# Patient Record
Sex: MALE | Race: WHITE | NOT HISPANIC OR LATINO | Employment: OTHER | URBAN - METROPOLITAN AREA
[De-identification: names, ages, dates, MRNs, and addresses within clinical notes are randomized per-mention and may not be internally consistent; named-entity substitution may affect disease eponyms.]

---

## 2018-07-26 ENCOUNTER — OFFICE VISIT (OUTPATIENT)
Dept: MEDICAL GROUP | Facility: PHYSICIAN GROUP | Age: 68
End: 2018-07-26
Payer: MEDICARE

## 2018-07-26 VITALS
WEIGHT: 184 LBS | HEART RATE: 71 BPM | SYSTOLIC BLOOD PRESSURE: 100 MMHG | RESPIRATION RATE: 16 BRPM | BODY MASS INDEX: 25.76 KG/M2 | OXYGEN SATURATION: 96 % | HEIGHT: 71 IN | DIASTOLIC BLOOD PRESSURE: 62 MMHG | TEMPERATURE: 97.4 F

## 2018-07-26 DIAGNOSIS — Z23 NEED FOR VACCINATION: ICD-10-CM

## 2018-07-26 DIAGNOSIS — I25.2 HISTORY OF MI (MYOCARDIAL INFARCTION): ICD-10-CM

## 2018-07-26 DIAGNOSIS — I48.20 CHRONIC ATRIAL FIBRILLATION (HCC): ICD-10-CM

## 2018-07-26 DIAGNOSIS — Z12.11 SCREENING FOR COLON CANCER: ICD-10-CM

## 2018-07-26 DIAGNOSIS — E78.2 MIXED HYPERLIPIDEMIA: ICD-10-CM

## 2018-07-26 DIAGNOSIS — Z76.89 ESTABLISHING CARE WITH NEW DOCTOR, ENCOUNTER FOR: ICD-10-CM

## 2018-07-26 DIAGNOSIS — M25.522 LEFT ELBOW PAIN: ICD-10-CM

## 2018-07-26 DIAGNOSIS — I10 ESSENTIAL HYPERTENSION: ICD-10-CM

## 2018-07-26 DIAGNOSIS — Z95.0 PACEMAKER: ICD-10-CM

## 2018-07-26 DIAGNOSIS — F17.210 CIGARETTE NICOTINE DEPENDENCE WITHOUT COMPLICATION: ICD-10-CM

## 2018-07-26 DIAGNOSIS — R05.9 COUGH: ICD-10-CM

## 2018-07-26 PROCEDURE — 90715 TDAP VACCINE 7 YRS/> IM: CPT | Performed by: NURSE PRACTITIONER

## 2018-07-26 PROCEDURE — 99204 OFFICE O/P NEW MOD 45 MIN: CPT | Mod: 25 | Performed by: NURSE PRACTITIONER

## 2018-07-26 PROCEDURE — 90670 PCV13 VACCINE IM: CPT | Performed by: NURSE PRACTITIONER

## 2018-07-26 PROCEDURE — G0009 ADMIN PNEUMOCOCCAL VACCINE: HCPCS | Performed by: NURSE PRACTITIONER

## 2018-07-26 RX ORDER — CARVEDILOL 6.25 MG/1
25 TABLET ORAL 2 TIMES DAILY WITH MEALS
COMMUNITY
End: 2019-11-27

## 2018-07-26 RX ORDER — MAGNESIUM OXIDE 400 MG/1
400 TABLET ORAL DAILY
COMMUNITY
End: 2023-05-24

## 2018-07-26 ASSESSMENT — PATIENT HEALTH QUESTIONNAIRE - PHQ9: CLINICAL INTERPRETATION OF PHQ2 SCORE: 0

## 2018-07-26 NOTE — ASSESSMENT & PLAN NOTE
1988 for MI, several following that   Pacemaker  CABG x 3 stents   Now followed by Dr. Read at least every 6 months.   Prescribed Plavix, Carvediolol, Lisinopril, daily ASA 81

## 2018-07-26 NOTE — ASSESSMENT & PLAN NOTE
This is a chronic problem which is been well controlled with carvedilol, lisinopril prescribed by Dr. Read for several years.  Patient denies any chest pain, shortness of breath, palpitations, dizziness, syncope.  His blood pressure today is 100/62.  He is due to see Dr. Read in 2 months.

## 2018-07-26 NOTE — PROGRESS NOTES
Chief Complaint   Patient presents with   • Establish Care   • Cough        Marty Freedman is a 68 y.o. male here today to establish care and to discuss the evaluation and management of:    HPI:      Patient did have a prior PCP but he has not seen them in several years.  He is followed mainly by his cardiologist.    Left elbow pain  This is a new problem. Patient reports he was on vacation, he fell on a boat ramp and landed on his left elbow. He was seen in the ER, reports no broken bones. He then ended up with an infection from the laceration, he saw the ER again and was treated with two different abx and this has since resolved. He was told to follow up with his PCP which is why he has mentioned this today.     Pacemaker  Placed Dr. Read, Oct 2017   A-Fib    Cigarette nicotine dependence without complication  Lung Cancer Screening     Age 55-77 years - 68 y.o.  Current signs/symptoms of lung cancer: No   Previous history of lung cancer: None  Tobacco smoking history of at least 30 pack years:  reports that he has been smoking Cigarettes.  He has a 45.00 pack-year smoking history. He has never used smokeless tobacco.  Current smoker: yes  No chest CT within the last 12 months   Referral placed for LDCT (AR0479): Yes                     Essential hypertension  This is a chronic problem which is been well controlled with carvedilol, lisinopril prescribed by Dr. Read for several years.  Patient denies any chest pain, shortness of breath, palpitations, dizziness, syncope.  His blood pressure today is 100/62.  He is due to see Dr. Read in 2 months.    History of MI (myocardial infarction)  1988 for MI, several following that   Pacemaker  CABG x 3 stents   Now followed by Dr. Read at least every 6 months.   Prescribed Plavix, Carvediolol, Lisinopril, daily ASA 81    Cough  This is a new problem.  Patient reports a dry nagging cough daily.  He has been on lisinopril for several years.  He is a smoker.  Patient reports cough is  nonproductive, no other concerning symptoms reported to include itchy watery eyes, congestion, sore throat, shortness of breath.    Chronic atrial fibrillation (HCC)  This is a chronic problem for which patient is followed by Dr. Read's office.  He is prescribed Plavix 75 mg daily.    Mixed hyperlipidemia  This is a chronic problem for which patient reports is well controlled with pravastatin 80 mg daily.  He is prescribed this medication by his cardiologist, Dr. Read.        Current medicines (including changes today)  Current Outpatient Prescriptions   Medication Sig Dispense Refill   • carvedilol (COREG) 6.25 MG Tab Take 6.25 mg by mouth 2 times a day, with meals.     • aspirin EC (ECOTRIN) 81 MG Tablet Delayed Response Take 81 mg by mouth every day.     • magnesium oxide (MAG-OX) 400 MG Tab Take 400 mg by mouth every day.     • lisinopril (PRINIVIL) 20 MG TABS Take 20 mg by mouth every evening.     • clopidogrel (PLAVIX) 75 MG TABS Take 75 mg by mouth every evening.     • pravastatin (PRAVACHOL) 80 MG tablet Take 80 mg by mouth every evening.       No current facility-administered medications for this visit.        He  has a past medical history of Anesthesia; Arthritis; Atrial fibrillation (HCC); Dental disorder; High cholesterol; Hypertension; Myocardial infarct (HCC) (2007); Other and unspecified angina pectoris; and Pain.    He  has a past surgical history that includes other orthopedic surgery; inguinal hernia repair bilateral (3/2/2015); and multiple coronary artery bypass (1991).     Social History   Substance Use Topics   • Smoking status: Current Every Day Smoker     Packs/day: 1.00     Years: 45.00     Types: Cigarettes   • Smokeless tobacco: Never Used      Comment: he has cut down most recently to 4-5 day   • Alcohol use 0.6 oz/week     1 Cans of beer per week      Comment: occaisionally       Social History     Social History Narrative   • No narrative on file       Family History   Problem Relation Age  "of Onset   • Stroke Mother    • Heart Attack Mother    • Cancer Father    • Stroke Father    • Heart Attack Father    • Heart Attack Paternal Grandmother    • Heart Attack Paternal Grandfather        Family Status   Relation Status   • Mo    • Fa    • PGMo    • PGFa        ROS   Constitutional: Denies fever, chills, or sweats  Eyes: negative for visual blurring, double vision, eye pain, floaters and discharge from eyes  ENT: negative for tinnitus, vertigo, frequent URI's, sinus trouble, persistent sore throat  Respiratory: negative for persistent cough, hemoptysis, dyspnea, wheezing  Cardiovascular: negative for palpitations, chest pain, or peripheral edema.  Gastrointestinal: negative for poor appetite, dysphagia, nausea, heartburn, abdominal pain, hemorrhoids, constipation or diarrhea  Genitourinary: negative for dysuria.  Musculoskeletal: negative for joint swelling and muscle pain/ soreness  Skin: negative for rash, scaling, hair or nail changes.  Neurologic: negative for migraine headaches, involuntary movements or tremor  Psychiatric: negative for excessive alcohol consumption or illegal drug usage, sleep disturbance, anxiety, depression  Hematologic/Lymphatic/Immunologic: negative for unusual bruising, swollen glands  Endocrine: negative for temperature intolerance, polydipsia, polyuria, unintentional weight changes.        Objective:     Blood pressure 100/62, pulse 71, temperature 36.3 °C (97.4 °F), resp. rate 16, height 1.803 m (5' 11\"), weight 83.5 kg (184 lb), SpO2 96 %. Body mass index is 25.66 kg/m².    Physical Exam:   Constitutional: Alert, no distress.  Eye: Equal, round and reactive, conjunctiva clear, lids normal.  ENMT: Lips without lesions, good dentition, oropharynx clear. TM's normal without erythema, canals patent. Normal appearance of external nose, no drainage noted.   Neck: Trachea midline, no masses, no thyromegaly. No cervical or supraclavicular " lymphadenopathy.   Respiratory: Unlabored respiratory effort, lungs clear to auscultation, no wheezes, no ronchi.  Cardiovascular: Paced S1, S2, no murmur, no edema. Capillary refill < 2 seconds in UE bilaterally.   Abdomen: Soft, non-tender, no masses, no hepatosplenomegaly. Normal bowel sounds.   Skin: Warm, dry, good turgor, no rashes in visible areas.  Neuro: DTR 2+ LE, CN II-XII grossly intact, normal sensation in extremities.   Psych: Alert and oriented x3, normal affect and mood.      Assessment and Plan:   The following treatment plan was discussed    1. Establishing care with new doctor, encounter for    2. Essential hypertension  Appears well controlled, advised to obtain fasting labs.  I will also attempt to obtain most recent note from Dr. Read's office.  - LIPID PROFILE; Future  - TSH WITH REFLEX TO FT4; Future  - COMP METABOLIC PANEL; Future  - ESTIMATED GFR; Future    3. History of MI (myocardial infarction)  Discussed signs and symptoms to seek emergent care.    4. Left elbow pain  Appears resolved.  No signs and symptoms noted on exam today of infection or pain.    5. Cigarette nicotine dependence without complication  - REFERRAL TO LUNG CANCER SCREENING PROGRAM  - -[facility]; Future    6. Cough  Likely related to lisinopril, at this time he will follow-up with his cardiologist for any medication changes.    7. Pacemaker    8. Chronic atrial fibrillation (HCC)    9. Screening for colon cancer  - REFERRAL TO GI FOR COLONOSCOPY    10. Need for vaccination  - PNEUMOCOCCAL CONJUGATE VACCINE 13-VALENT  - Tdap =>8yo IM    11. Mixed hyperlipidemia        Reviewed risks and benefits of treatment plan. Patient verbally agrees to plan of care.     Records requested.    Followup: Return in about 6 months (around 1/26/2019).    MOIZ Lyons.     PLEASE NOTE: This dictation was created using voice recognition software. I have made every reasonable attempt to correct obvious errors, but I expect that  there may be errors of grammar and possibly content that I did not discover prior finalizing this note.

## 2018-07-26 NOTE — ASSESSMENT & PLAN NOTE
This is a chronic problem for which patient is followed by Dr. Read's office.  He is prescribed Plavix 75 mg daily.

## 2018-07-26 NOTE — PATIENT INSTRUCTIONS
Your medical care was provided today by: ASHTYN Rodriguez    Thank You for the opportunity to serve you.    You may receive a brief survey in the mail shortly regarding your visit today. Please take a few moments to complete the survey and return it; no postage is necessary. We are working to serve our patient population better, improve customer service and our patients overall experience and your input can help us to accomplish this. We thank you for your help and for the opportunity to serve you today and in the future.     Special Instructions:  Always call 9-1-1 immediately if you develop a life threatening emergency.    Unless told otherwise please take all medications as directed and complete prescription therapies.     Watch for the following signs that require additional evaluation: progressive lethargy or unresponsiveness, localized pain (chest, abdomen), shortness of breath, painful breathing, progressive vomiting with weakness, bloody stools, or new rash.     If you are prescribed pain medication or any other medication that is sedating, do not take medication before or while operating a vehicle or heavy machinery or equipment due to potential side effects such as drowsiness and/or dizziness.

## 2018-07-26 NOTE — LETTER
[place]  ROSALIA Lyons  [address]  [facility] [address]  Fax: [phone]   Authorization for Release/Disclosure of   Protected Health Information   Name: KALIE SANDERS : 1950 SSN: [id number]   Address: [address] [place] [address] Phone:    [phone] (home)    I authorize the entity listed below to release/disclose the PHI below to:   [facility]/ROSALIA Lyons and ROSALIA Lyons   Provider or Entity Name:  [facility]   Address   City, [place], Zip:               [address] [place] [address]   Phone:  [phone]      Fax:      [phone]        Reason for request: continuity of care   Information to be released:    [ X ] LAST COLONOSCOPY,  including any PATH REPORT and follow-up  [ X ] LAST FIT/COLOGUARD RESULT [  ] LAST DEXA  [  ] LAST MAMMOGRAM  [  ] LAST PAP  [  ] LAST LABS [  ] RETINA EXAM REPORT  [  ] IMMUNIZATION RECORDS  [  ] Release all info      [  ] Check here and initial the line next to each item to release ALL health information INCLUDING  _____ Care and treatment for drug and / or alcohol abuse  _____ HIV testing, infection status, or AIDS  _____ Genetic Testing    DATES OF SERVICE OR TIME PERIOD TO BE DISCLOSED: _____________  I understand and acknowledge that:  * This Authorization may be revoked at any time by you in writing, except if your health information has already been used or disclosed.  * Your health information that will be used or disclosed as a result of you signing this authorization could be re-disclosed by the recipient. If this occurs, your re-disclosed health information may no longer be protected by State or Federal laws.  * You may refuse to sign this Authorization. Your refusal will not affect your ability to obtain treatment.  * This Authorization becomes effective upon signing and will  on (date) __________.      If no date is indicated, this Authorization  will  one (1) year from the signature date.    Name: Marty Freedman    Signature:   Date:     2018       PLEASE FAX REQUESTED RECORDS BACK TO: [phone]

## 2018-07-26 NOTE — ASSESSMENT & PLAN NOTE
This is a new problem. Patient reports he was on vacation, he fell on a boat ramp and landed on his left elbow. He was seen in the ER, reports no broken bones. He then ended up with an infection from the laceration, he saw the ER again and was treated with two different abx and this has since resolved. He was told to follow up with his PCP which is why he has mentioned this today.

## 2018-07-26 NOTE — ASSESSMENT & PLAN NOTE
This is a chronic problem for which patient reports is well controlled with pravastatin 80 mg daily.  He is prescribed this medication by his cardiologist, Dr. Read.

## 2018-07-26 NOTE — ASSESSMENT & PLAN NOTE
This is a new problem.  Patient reports a dry nagging cough daily.  He has been on lisinopril for several years.  He is a smoker.  Patient reports cough is nonproductive, no other concerning symptoms reported to include itchy watery eyes, congestion, sore throat, shortness of breath.

## 2018-07-26 NOTE — LETTER
[email or web address]  ROSALIA Lyons  [address]  [facility] [address]  Fax: [phone]   Authorization for Release/Disclosure of   Protected Health Information   Name: MARTY SANDERS : 1950 SSN: [id number]   Address: [address] [place] [address] Phone:    [phone] (home)    I authorize the entity listed below to release/disclose the PHI below to:   [email or web address] Lyons and ROSALIA Lyons   Provider or Entity Name:  Dr. Read   [facility]   City, [facility]   Phone:      Fax:     Reason for request: continuity of care   Information to be released:    [  ] LAST COLONOSCOPY,  including any PATH REPORT and follow-up  [  ] LAST FIT/COLOGUARD RESULT [  ] LAST DEXA  [  ] LAST MAMMOGRAM  [  ] LAST PAP  [  ] LAST LABS [  ] RETINA EXAM REPORT  [  ] IMMUNIZATION RECORDS  [  ] Release all info  [ X ] last visit note      [  ] Check here and initial the line next to each item to release ALL health information INCLUDING  _____ Care and treatment for drug and / or alcohol abuse  _____ HIV testing, infection status, or AIDS  _____ Genetic Testing    DATES OF SERVICE OR TIME PERIOD TO BE DISCLOSED: _____________  I understand and acknowledge that:  * This Authorization may be revoked at any time by you in writing, except if your health information has already been used or disclosed.  * Your health information that will be used or disclosed as a result of you signing this authorization could be re-disclosed by the recipient. If this occurs, your re-disclosed health information may no longer be protected by State or Federal laws.  * You may refuse to sign this Authorization. Your refusal will not affect your ability to obtain treatment.  * This Authorization becomes effective upon signing and will  on (date) __________.      If no date is indicated, this Authorization will  one (1) year from the signature date.    Name: Marty Sanders    Signature:   Date:     7/26/2018       PLEASE FAX REQUESTED RECORDS BACK TO: [phone]

## 2018-07-26 NOTE — ASSESSMENT & PLAN NOTE
Lung Cancer Screening     Age 55-77 years - 68 y.o.  Current signs/symptoms of lung cancer: No   Previous history of lung cancer: None  Tobacco smoking history of at least 30 pack years:  reports that he has been smoking Cigarettes.  He has a 45.00 pack-year smoking history. He has never used smokeless tobacco.  Current smoker: yes  No chest CT within the last 12 months   Referral placed for LDCT (IK7503): Yes

## 2018-07-31 ENCOUNTER — HOSPITAL ENCOUNTER (OUTPATIENT)
Dept: LAB | Facility: MEDICAL CENTER | Age: 68
End: 2018-07-31
Attending: NURSE PRACTITIONER
Payer: MEDICARE

## 2018-07-31 DIAGNOSIS — I10 ESSENTIAL HYPERTENSION: ICD-10-CM

## 2018-07-31 LAB
ALBUMIN SERPL BCP-MCNC: 3.9 G/DL (ref 3.2–4.9)
ALBUMIN/GLOB SERPL: 1.9 G/DL
ALP SERPL-CCNC: 67 U/L (ref 30–99)
ALT SERPL-CCNC: 7 U/L (ref 2–50)
ANION GAP SERPL CALC-SCNC: 6 MMOL/L (ref 0–11.9)
AST SERPL-CCNC: 13 U/L (ref 12–45)
BILIRUB SERPL-MCNC: 0.8 MG/DL (ref 0.1–1.5)
BUN SERPL-MCNC: 20 MG/DL (ref 8–22)
CALCIUM SERPL-MCNC: 9.2 MG/DL (ref 8.5–10.5)
CHLORIDE SERPL-SCNC: 107 MMOL/L (ref 96–112)
CHOLEST SERPL-MCNC: 120 MG/DL (ref 100–199)
CO2 SERPL-SCNC: 24 MMOL/L (ref 20–33)
CREAT SERPL-MCNC: 0.9 MG/DL (ref 0.5–1.4)
GLOBULIN SER CALC-MCNC: 2.1 G/DL (ref 1.9–3.5)
GLUCOSE SERPL-MCNC: 106 MG/DL (ref 65–99)
HDLC SERPL-MCNC: 31 MG/DL
LDLC SERPL CALC-MCNC: 61 MG/DL
POTASSIUM SERPL-SCNC: 4.1 MMOL/L (ref 3.6–5.5)
PROT SERPL-MCNC: 6 G/DL (ref 6–8.2)
SODIUM SERPL-SCNC: 137 MMOL/L (ref 135–145)
T4 FREE SERPL-MCNC: 5.06 NG/DL (ref 0.53–1.43)
TRIGL SERPL-MCNC: 142 MG/DL (ref 0–149)
TSH SERPL DL<=0.005 MIU/L-ACNC: <0.005 UIU/ML (ref 0.38–5.33)

## 2018-07-31 PROCEDURE — 84439 ASSAY OF FREE THYROXINE: CPT

## 2018-07-31 PROCEDURE — 80061 LIPID PANEL: CPT

## 2018-07-31 PROCEDURE — 80053 COMPREHEN METABOLIC PANEL: CPT

## 2018-07-31 PROCEDURE — 36415 COLL VENOUS BLD VENIPUNCTURE: CPT

## 2018-07-31 PROCEDURE — 84443 ASSAY THYROID STIM HORMONE: CPT

## 2018-08-02 ENCOUNTER — TELEPHONE (OUTPATIENT)
Dept: MEDICAL GROUP | Facility: PHYSICIAN GROUP | Age: 68
End: 2018-08-02

## 2018-08-02 NOTE — TELEPHONE ENCOUNTER
Spoke with patient and let them know ROSALIA Lyons's message. Pt is scheduled to see ASHTYN Rodriguez

## 2018-08-02 NOTE — TELEPHONE ENCOUNTER
----- Message from ROSALIA Lyons sent at 8/2/2018  1:44 PM PDT -----  Please call patient and advise he needs an appt to discuss his labs results. Thank you.   ASHTYN Rodriguez

## 2018-08-06 ENCOUNTER — TELEPHONE (OUTPATIENT)
Dept: HEMATOLOGY ONCOLOGY | Facility: MEDICAL CENTER | Age: 68
End: 2018-08-06

## 2018-08-06 NOTE — TELEPHONE ENCOUNTER
Received referral to lung cancer screening program.  Chart review to assess for lung cancer screening program eligibility.   1. Age 55-77 yrs of age? Yes 68 y.o.  2. 30 pack year hx of smoking, or greater? Yes 1 [id number]= 45 pkyr hx  3. Current smoker or if quit, has pt quit within last 15 yrs?Yes    4. Any signs or symptoms of lung cancer? None noted  5. Previous history of lung cancer? None noted  6. Chest CT within past 12 mos.? None noted  Patient does meet eligibility criteria. [facility] scheduling notified to schedule the shared decision making visit.

## 2018-08-08 ENCOUNTER — PATIENT MESSAGE (OUTPATIENT)
Dept: MEDICAL GROUP | Facility: PHYSICIAN GROUP | Age: 68
End: 2018-08-08

## 2018-08-08 ENCOUNTER — OFFICE VISIT (OUTPATIENT)
Dept: MEDICAL GROUP | Facility: PHYSICIAN GROUP | Age: 68
End: 2018-08-08
Payer: MEDICARE

## 2018-08-08 ENCOUNTER — TELEPHONE (OUTPATIENT)
Dept: MEDICAL GROUP | Facility: PHYSICIAN GROUP | Age: 68
End: 2018-08-08

## 2018-08-08 VITALS
HEIGHT: 71 IN | OXYGEN SATURATION: 94 % | WEIGHT: 186 LBS | SYSTOLIC BLOOD PRESSURE: 102 MMHG | DIASTOLIC BLOOD PRESSURE: 78 MMHG | RESPIRATION RATE: 20 BRPM | BODY MASS INDEX: 26.04 KG/M2 | HEART RATE: 73 BPM | TEMPERATURE: 97.8 F

## 2018-08-08 DIAGNOSIS — R05.9 COUGH: ICD-10-CM

## 2018-08-08 DIAGNOSIS — I48.20 CHRONIC ATRIAL FIBRILLATION (HCC): ICD-10-CM

## 2018-08-08 DIAGNOSIS — R79.89 ELEVATED SERUM FREE T4 LEVEL: ICD-10-CM

## 2018-08-08 DIAGNOSIS — R79.89 LOW TSH LEVEL: ICD-10-CM

## 2018-08-08 DIAGNOSIS — Z95.0 PACEMAKER: ICD-10-CM

## 2018-08-08 DIAGNOSIS — I42.9 CARDIOMYOPATHY, UNSPECIFIED TYPE (HCC): ICD-10-CM

## 2018-08-08 PROCEDURE — 99214 OFFICE O/P EST MOD 30 MIN: CPT | Performed by: NURSE PRACTITIONER

## 2018-08-08 RX ORDER — LOSARTAN POTASSIUM 25 MG/1
25 TABLET ORAL DAILY
COMMUNITY
End: 2018-10-17

## 2018-08-08 NOTE — TELEPHONE ENCOUNTER
Please call patient and advised that we are unable to send him a my chart message because his my chart is currently pending.  That being said he has been scheduled for an endocrinology appointment.  Please give him the information listed below.     08/16 10:00am, Veronica Holden PA-C  [address] [facility] [address] [place] [address], [phone].     Thank you.   ASHTYN Rodriguez

## 2018-08-08 NOTE — PATIENT INSTRUCTIONS
Lung Cancer Screening   [facility]ematology Oncology  Phone: [phone]    Schedule Ultrasound [phone]    Your medical care was provided today by: ASHTYN Rodriguez    Thank You for the opportunity to serve you.    You may receive a brief survey in the mail shortly regarding your visit today. Please take a few moments to complete the survey and return it; no postage is necessary. We are working to serve our patient population better, improve customer service and our patients overall experience and your input can help us to accomplish this. We thank you for your help and for the opportunity to serve you today and in the future.     Special Instructions:  Always call 9-1-1 immediately if you develop a life threatening emergency.    Unless told otherwise please take all medications as directed and complete prescription therapies.     Watch for the following signs that require additional evaluation: progressive lethargy or unresponsiveness, localized pain (chest, abdomen), shortness of breath, painful breathing, progressive vomiting with weakness, bloody stools, or new rash.     If you are prescribed pain medication or any other medication that is sedating, do not take medication before or while operating a vehicle or heavy machinery or equipment due to potential side effects such as drowsiness and/or dizziness.

## 2018-08-08 NOTE — ASSESSMENT & PLAN NOTE
This is a chronic problem for which patient is followed by Dr. Read's office.  Dr Read recently just continued Plavix, patient is now started on Eliquis.  He was also changed from lisinopril to losartan due to cough.  Patient does have an appointment today to see Dr. Read.  Last week he actually went into A. fib and he was shocked via his pacemaker.  Patient reports he is feeling very fatigued.  He reports that his cardiologist does believe that perhaps his recent lab results may be having an influence on his cardiac changes.

## 2018-08-08 NOTE — PROGRESS NOTES
Subjective:     Marty Freedman is a 68 y.o. male here today for evaluation and management of the following:     Chronic atrial fibrillation (HCC)  This is a chronic problem for which patient is followed by Dr. Read's office.  Dr Read recently just continued Plavix, patient is now started on Eliquis.  He was also changed from lisinopril to losartan due to cough.  Patient does have an appointment today to see Dr. Read.  Last week he actually went into A. fib and he was shocked via his pacemaker.  Patient reports he is feeling very fatigued.  He reports that his cardiologist does believe that perhaps his recent lab results may be having an influence on his cardiac changes.    Cough  This is a new problem.  Patient reports a dry nagging cough daily.  He has been on lisinopril for several years.  He is a smoker.  Patient reports cough is nonproductive, no other concerning symptoms reported to include itchy watery eyes, congestion, sore throat, shortness of breath.    He reports a mild improvement since discontinuing lisinopril although the air quality is poor currently so he still does have a minimal cough.    Pacemaker  Placed Dr. Read, Oct 2017   A-Fib    Cardiomyopathy (HCC)  Followed by Dr. Read        Current medicines (including changes today)  Current Outpatient Prescriptions   Medication Sig Dispense Refill   • apixaban (ELIQUIS) 5mg Tab Take 5 mg by mouth 2 Times a Day.     • losartan (COZAAR) 25 MG Tab Take 25 mg by mouth every day.     • carvedilol (COREG) 6.25 MG Tab Take 6.25 mg by mouth 2 times a day, with meals.     • aspirin EC (ECOTRIN) 81 MG Tablet Delayed Response Take 81 mg by mouth every day.     • magnesium oxide (MAG-OX) 400 MG Tab Take 400 mg by mouth every day.     • pravastatin (PRAVACHOL) 80 MG tablet Take 80 mg by mouth every evening.       No current facility-administered medications for this visit.        He  has a past medical history of Anesthesia; Arthritis; Atrial fibrillation (HCC); Dental  "disorder; High cholesterol; Hypertension; Myocardial infarct (HCC) (2007); Other and unspecified angina pectoris; and Pain.    He  has a past surgical history that includes other orthopedic surgery; inguinal hernia repair bilateral (3/2/2015); and multiple coronary artery bypass (1991).     Social History     Social History   • Marital status:      Spouse name: N/A   • Number of children: N/A   • Years of education: N/A     Social History Main Topics   • Smoking status: Current Every Day Smoker     Packs/day: 1.00     Years: 45.00     Types: Cigarettes   • Smokeless tobacco: Never Used      Comment: he has cut down most recently to 4-5 day   • Alcohol use 0.6 oz/week     1 Cans of beer per week      Comment: occaisionally   • Drug use: No   • Sexual activity: Not on file     Other Topics Concern   • Not on file     Social History Narrative   • No narrative on file       Family History   Problem Relation Age of Onset   • Stroke Mother    • Heart Attack Mother    • Cancer Father    • Stroke Father    • Heart Attack Father    • Heart Attack Paternal Grandmother    • Heart Attack Paternal Grandfather          ROS  Positive for fatigue.   No fever, no chest pain, no shortness of breath, no abdominal pain, no rashes    All other systems reviewed and are negative.        Objective:     Blood pressure 102/78, pulse 73, temperature 36.6 °C (97.8 °F), resp. rate 20, height 1.803 m (5' 11\"), weight 84.4 kg (186 lb), SpO2 94 %. Body mass index is 25.94 kg/m².    Physical Exam:   Constitutional: Alert, no distress.  Eye: Equal, round and reactive, conjunctiva clear, lids normal.   ENMT: Lips without lesions, oropharynx clear.   Neck: Trachea midline, no masses, no thyromegaly. No cervical or supraclavicular lymphadenopathy  Respiratory: Unlabored respiratory effort, lungs clear to auscultation, no wheezes, no ronchi.  Cardiovascular: Normal S1, S2, no murmur, no edema.   Skin: Warm, dry, good turgor, no rashes in visible " areas.  Psych: Alert and oriented x3, normal affect and mood.        Assessment and Plan:   The following treatment plan was discussed    1. Chronic atrial fibrillation (HCC)  I suspect that it is possible that his thyroid dysfunction is influencing his cardiac issues.  I have placed in urgent referral for endocrinology.  He will also follow-up with his cardiologist today.  Discussed signs and symptoms to seek emergent care.  - REFERRAL TO ENDOCRINOLOGY    2. Cough    3. Low TSH level  - REFERRAL TO ENDOCRINOLOGY    4. Elevated serum free T4 level  - REFERRAL TO ENDOCRINOLOGY    5. Pacemaker  - REFERRAL TO ENDOCRINOLOGY    6. Cardiomyopathy, unspecified type (HCC)       I did also provide contact information for patient to schedule both his ultrasound and lung cancer screening.  Those referrals have been processed in his chart.    Reviewed risks and benefits of treatment plan. Patient verbally agrees to plan of care.       Followup: Return in about 1 month (around 9/8/2018).    MOIZ Lyons.     PLEASE NOTE: This dictation was created using voice recognition software. I have made every reasonable attempt to correct obvious errors, but I expect that there may be errors of grammar and possibly content that I did not discover prior finalizing this note.

## 2018-08-08 NOTE — ASSESSMENT & PLAN NOTE
This is a new problem.  Patient reports a dry nagging cough daily.  He has been on lisinopril for several years.  He is a smoker.  Patient reports cough is nonproductive, no other concerning symptoms reported to include itchy watery eyes, congestion, sore throat, shortness of breath.    He reports a mild improvement since discontinuing lisinopril although the air quality is poor currently so he still does have a minimal cough.

## 2018-08-08 NOTE — TELEPHONE ENCOUNTER
Patient asked me to send information for Endo appt via [facility] but his mychart is pending, I will have MA call him.   Senthil Montes APRN

## 2018-08-16 ENCOUNTER — OFFICE VISIT (OUTPATIENT)
Dept: HEMATOLOGY ONCOLOGY | Facility: MEDICAL CENTER | Age: 68
End: 2018-08-16
Payer: MEDICARE

## 2018-08-16 ENCOUNTER — OFFICE VISIT (OUTPATIENT)
Dept: ENDOCRINOLOGY | Facility: MEDICAL CENTER | Age: 68
End: 2018-08-16
Payer: MEDICARE

## 2018-08-16 VITALS
HEIGHT: 71 IN | SYSTOLIC BLOOD PRESSURE: 118 MMHG | BODY MASS INDEX: 26.21 KG/M2 | DIASTOLIC BLOOD PRESSURE: 80 MMHG | OXYGEN SATURATION: 97 % | WEIGHT: 187.2 LBS | HEART RATE: 59 BPM

## 2018-08-16 VITALS
OXYGEN SATURATION: 95 % | SYSTOLIC BLOOD PRESSURE: 112 MMHG | BODY MASS INDEX: 26.37 KG/M2 | WEIGHT: 188.38 LBS | RESPIRATION RATE: 18 BRPM | TEMPERATURE: 97.5 F | DIASTOLIC BLOOD PRESSURE: 58 MMHG | HEIGHT: 71 IN | HEART RATE: 70 BPM

## 2018-08-16 DIAGNOSIS — E05.90 HYPERTHYROIDISM: ICD-10-CM

## 2018-08-16 DIAGNOSIS — F17.210 CIGARETTE SMOKER: ICD-10-CM

## 2018-08-16 PROCEDURE — 99214 OFFICE O/P EST MOD 30 MIN: CPT | Performed by: PHYSICIAN ASSISTANT

## 2018-08-16 PROCEDURE — G0296 VISIT TO DETERM LDCT ELIG: HCPCS | Performed by: NURSE PRACTITIONER

## 2018-08-16 RX ORDER — DIGOXIN 0.25 MG/ML
INJECTION INTRAMUSCULAR; INTRAVENOUS DAILY
COMMUNITY
End: 2018-10-17

## 2018-08-16 RX ORDER — METHIMAZOLE 10 MG/1
10 TABLET ORAL 2 TIMES DAILY
Qty: 60 TAB | Refills: 1 | Status: SHIPPED | OUTPATIENT
Start: 2018-08-16 | End: 2018-10-17

## 2018-08-16 ASSESSMENT — ENCOUNTER SYMPTOMS
WEIGHT LOSS: 0
COUGH: 1
HEMOPTYSIS: 0
SHORTNESS OF BREATH: 1
WHEEZING: 0
SPUTUM PRODUCTION: 1
SORE THROAT: 1

## 2018-08-16 ASSESSMENT — PAIN SCALES - GENERAL: PAINLEVEL: NO PAIN

## 2018-08-16 NOTE — PROGRESS NOTES
"Subjective:      Marty Freedman is a 68 y.o. male who presents for Lung Cancer Screening Program Prescreen (Nicotine Dependence) for lung cancer screening shared decision making visit.       HPI   Patient seen today for initial lung cancer screening visit. Patient referred by PCP, ASHTYN Rodriguez.     The patient meets eligibility criteria including age, smoking history (30+ pack years), if former smoker, quit in the last 15 years, and absence of signs or symptoms of lung cancer.    - Age - 68  - Smoking history - Patient has smoked for 45 years at an average of 1 ppd = 45 pack year smoking history.  - Current smoking status - current smoker, ~5 cigarettes per day  - No symptoms of lung cancer and no previous history of lung cancer       Review of Systems   Constitutional: Positive for malaise/fatigue (related to thyroid). Negative for weight loss.   HENT: Positive for sore throat (from air quality and smoking).    Respiratory: Positive for cough (smoker's), sputum production (dry clear/white) and shortness of breath (with exertion x 3 weeks - with recetn air quality, better with change in thyroid meds). Negative for hemoptysis and wheezing.           Objective:     /58   Pulse 70   Temp 36.4 °C (97.5 °F)   Resp 18   Ht 1.803 m (5' 10.98\")   Wt 85.5 kg (188 lb 6.1 oz)   SpO2 95%   BMI 26.29 kg/m²      Physical Exam   Constitutional: He is oriented to person, place, and time. He appears well-developed and well-nourished. No distress.   Cardiovascular: Normal rate, regular rhythm and normal heart sounds.  Exam reveals no gallop and no friction rub.    No murmur heard.  Pulmonary/Chest: Effort normal and breath sounds normal. No respiratory distress. He has no wheezes.   Musculoskeletal: Normal range of motion.   Neurological: He is alert and oriented to person, place, and time.   Skin: Skin is warm and dry. He is not diaphoretic.   Vitals reviewed.         Assessment/Plan:       1. Cigarette smoker  " CT-LUNG CANCER-SCREENING         We conducted a shared decision-making process using a decision aid. We reviewed benefits and harms of screening, including false positives and potential need for additional diagnostic testing, the possibility of over diagnosis, and total radiation exposure.    We discussed the importance of adhering to annual LDCT screening. We also discussed the impact of comorbities on the patient's the ability or willingness to undergo diagnostic procedure(s) and treatment.    Counseling on the importance of maintaining cigarette smoking abstinence if former smoker; or the importance of smoking cessation if current smoker and, if appropriate, furnishing of information about tobacco cessation interventions. I provided patient with smoking cessation materials and resources [facility] and the community. Patient appreciative of the resources.     Based on our discussion, we have decided to begin annual lung cancer screening starting now.

## 2018-08-16 NOTE — PROGRESS NOTES
New Patient Consult Note  Primary care physician: ROSALIA Lyons    Reason for consult:     HPI:  Marty Freedman is a 68 y.o. old patient who comes in today for evaluation of low TSH on 7/31 of less than 0.005 and free T4 5.06 this was repeated the following day by the cardiologist with TSH of 0.015 free T4 4.83.  According to the patient he has had chronic atrial fibrillation since 2016.  Last week he was seen by his primary care doctor's and cardiologist for atrial fibrillation with rapid ventricular response and defibrillation by his AICD.  His diet dose of Coreg was increased to 25 mg twice daily.    He complains of fatigue for the last several months, feeling shaky (internal) off and on, jittery/tremor of his upper extremities on occasion, decreased muscle mass over the last 2 years, Hot and cold temperature fluctuations.   Patient denies symptoms of sensation of a throat mass, voice changes, hoarseness, neck pain, or difficulty swallowing, dysphonia, cough, enlarged cervical lymph nodes, weight loss/gain.     Patient denies any history of child head or neck radiation (before the age of 17) a family history of thyroid cancer (or familial syndromes associated with thryroid cancer such as MEN2, familial adenomatous polyposis, or Cowden's syndrome).     Labs: 7/31/18- TSH <0.005 FT4 5.06   2/2015- TSH 2.270         ROS:  Constitutional: No weight loss or gain,  fever  HEENT: No difficulty with swallowing, change in voice, or swelling in throat area, dry eyes   Cardiac: No chest pain,   Resp: No shortness of breath  GI: No abdominal pain, nausea, vomiting, or diarrhea   Endo: No polyuria or polydipsia  All other systems were reviewed and were negative.    Past Medical History:  Patient Active Problem List    Diagnosis Date Noted   • Hyperthyroidism 08/16/2018   • Low TSH level 08/08/2018   • Elevated serum free T4 level 08/08/2018   • Cardiomyopathy (HCC) 08/08/2018   • Essential hypertension 07/26/2018  "  • History of MI (myocardial infarction) 07/26/2018   • Left elbow pain 07/26/2018   • Cigarette nicotine dependence without complication 07/26/2018   • Cough 07/26/2018   • Pacemaker 07/26/2018   • Chronic atrial fibrillation (HCC) 07/26/2018   • Mixed hyperlipidemia 07/26/2018       Past Surgical History:  Past Surgical History:   Procedure Laterality Date   • INGUINAL HERNIA REPAIR BILATERAL  3/2/2015    Performed by Jack Tobias M.D. at SURGERY SAME DAY [facility] ORS   • MULTIPLE CORONARY ARTERY BYPASS  1991    stents (X 3 placement= stents total)   • OTHER ORTHOPEDIC SURGERY      knee arthoscopy, bilaterally (\"early 80's\")       Allergies:  Patient has no known allergies.    Social History:  Social History     Social History   • Marital status:      Spouse name: N/A   • Number of children: N/A   • Years of education: N/A     Occupational History   • Not on file.     Social History Main Topics   • Smoking status: Current Every Day Smoker     Packs/day: 1.00     Years: 45.00     Types: Cigarettes   • Smokeless tobacco: Never Used      Comment: he has cut down most recently to 4-5 day   • Alcohol use 0.6 oz/week     1 Cans of beer per week      Comment: occaisionally   • Drug use: No   • Sexual activity: Not on file     Other Topics Concern   • Not on file     Social History Narrative   • No narrative on file       Family History:  Family History   Problem Relation Age of Onset   • Stroke Mother    • Heart Attack Mother    • Cancer Father    • Stroke Father    • Heart Attack Father    • Heart Attack Paternal Grandmother    • Heart Attack Paternal Grandfather        Medications:    Current Outpatient Prescriptions:   •  digoxin (LANOXIN) 0.25 MG/ML Solution, by Intravenous route every day at 6 PM., Disp: , Rfl:   •  methimazole (TAPAZOLE) 10 MG Tab, Take 1 Tab by mouth 2 times a day., Disp: 60 Tab, Rfl: 1  •  apixaban (ELIQUIS) 5mg Tab, Take 5 mg by mouth 2 Times a Day., Disp: , Rfl:   •  carvedilol " "(COREG) 6.25 MG Tab, Take 25 mg by mouth 2 times a day, with meals., Disp: , Rfl:   •  aspirin EC (ECOTRIN) 81 MG Tablet Delayed Response, Take 81 mg by mouth every day., Disp: , Rfl:   •  magnesium oxide (MAG-OX) 400 MG Tab, Take 400 mg by mouth every day., Disp: , Rfl:   •  pravastatin (PRAVACHOL) 80 MG tablet, Take 80 mg by mouth every evening., Disp: , Rfl:   •  losartan (COZAAR) 25 MG Tab, Take 25 mg by mouth every day., Disp: , Rfl:     Labs: Reviewed    Physical Examination:  Vital signs: /80   Pulse (!) 59   Ht 1.803 m (5' 11\")   Wt 84.9 kg (187 lb 3.2 oz)   SpO2 97%   BMI 26.11 kg/m²  Body mass index is 26.11 kg/m².  General: No apparent distress, cooperative, no facial hair    Eyes: No scleral icterus or discharge, no nystagmus  ENMT: Normal  thyroid exam  Neck: No abnormal masses on inspection no bruits noted   Resp: Normal effort, clear to auscultation bilaterally without wheezes, rales or rhonchi  CVS: Regular rate and rhythm, S1 S2 normal,intermittent ectopy     Extremities: +1 pedal edema, ecchymosis of the upper extremities, abrasion on the left knee   Neuro: Alert and oriented  Skin: No rash see extremities  Psych: Normal mood and affect, intact memory and able to make informed decisions    Assessment and Plan:    1. Hyperthyroidism  Secondary to symptomatic and low TSH and elevated FT4 he was started on methimazole 10 mg twice daily today.  Side effects were discussed with the patient in great detail.  I have ordered a thyroid uptake scan for the near future.  He understands he will need to hold his methimazole for 5 days prior and restart once exam is complete.  He was instructed to have labs done around the time of his uptake scan and again repeat TSH/T3/T4 prior to evaluation in 4 weeks to monitor thyroid levels.    He is planning to leave for [facility] on September 26 through the seventh.    - NM-THYROID UPTAKE 5HR SCAN; Future  - TSH; Future  - TRIIDOTHYRONINE; Future  - FREE " THYROXINE; Future  - T3 FREE; Future  - THYROID PEROXIDASE  (TPO) AB; Future  - THYROTROPIN RECEP AB; Future  - THYROGLOBULIN, QT; Future  - ANTITHYROGLOBULIN AB; Future        Return in about 4 weeks (around 9/13/2018).     Thank you for allowing me to participate in the care of this patient.    Veronica Holden P.A.-C.  08/16/18    CC:   KHOI LyonsRAMARIS    This note was created using voice recognition software (Dragon). The accuracy of the dictation is limited by the abilities of the software. I have reviewed the note prior to signing, however some errors in grammar and context are still possible. If you have any questions related to this note please do not hesitate to contact our office.

## 2018-08-27 ENCOUNTER — HOSPITAL ENCOUNTER (OUTPATIENT)
Dept: RADIOLOGY | Facility: MEDICAL CENTER | Age: 68
End: 2018-08-27
Attending: NURSE PRACTITIONER
Payer: MEDICARE

## 2018-08-27 DIAGNOSIS — F17.210 CIGARETTE NICOTINE DEPENDENCE WITHOUT COMPLICATION: ICD-10-CM

## 2018-08-27 PROCEDURE — 76775 US EXAM ABDO BACK WALL LIM: CPT

## 2018-08-28 ENCOUNTER — HOSPITAL ENCOUNTER (OUTPATIENT)
Dept: RADIOLOGY | Facility: MEDICAL CENTER | Age: 68
End: 2018-08-28
Attending: PHYSICIAN ASSISTANT
Payer: MEDICARE

## 2018-08-28 DIAGNOSIS — E05.90 HYPERTHYROIDISM: ICD-10-CM

## 2018-08-28 PROCEDURE — A9516 IODINE I-123 SOD IODIDE MIC: HCPCS

## 2018-08-29 ENCOUNTER — TELEPHONE (OUTPATIENT)
Dept: MEDICAL GROUP | Facility: CLINIC | Age: 68
End: 2018-08-29

## 2018-08-29 NOTE — TELEPHONE ENCOUNTER
----- Message from Zamzam Perry P.A.-C. sent at 8/27/2018 12:43 PM PDT -----  I reviewed ultrasound of abdominal aortic anyuerism. The aneurysm has grown very slightly. Otherwise, everything is within normal limits and looks good. Return for follow up as scheduled.

## 2018-09-10 ENCOUNTER — HOSPITAL ENCOUNTER (OUTPATIENT)
Dept: RADIOLOGY | Facility: MEDICAL CENTER | Age: 68
End: 2018-09-10
Attending: NURSE PRACTITIONER
Payer: MEDICARE

## 2018-09-10 DIAGNOSIS — F17.210 CIGARETTE SMOKER: ICD-10-CM

## 2018-09-10 PROCEDURE — G0297 LDCT FOR LUNG CA SCREEN: HCPCS

## 2018-09-11 ENCOUNTER — TELEPHONE (OUTPATIENT)
Dept: HEMATOLOGY ONCOLOGY | Facility: MEDICAL CENTER | Age: 68
End: 2018-09-11

## 2018-09-11 DIAGNOSIS — R91.8 ABNORMAL CT LUNG SCREENING: ICD-10-CM

## 2018-09-11 NOTE — LETTER
[address]  [place] [address]  P [phone]  F [phone]         Date: September 11, 2018    Marty Freedman III  [address]  [facility] [address]    Re:  Low-dose chest CT performed on 9/10/2018    Medical Record Number: [id number]    Dear Marty,     We are writing to let you know that the results of your recent low-dose chest CT (LDCT) examination shows one or more lung nodule(s) which are likely benign (not cancer).  Lung nodules are very common and many people without cancer have these nodules.  To make sure these nodule(s) are benign, and remain unchanged, your radiologist recommends you have another low-dose chest CT on or around 3/10/2019 . In the event that any additional “incidental” findings were identified from this exam, we have communicated back to your primary care provider for follow-up.    Here are some other important points you should know:  • Your low-dose Chest CT report has been sent to your referring or primary health care provider and is available to participants in [facility].  As a part of our Lung Cancer Screening program we will remind you and your referring health care provider when your next LDCT screening is due.  • Although low-dose chest CT is very effective at finding lung cancer early, it cannot find all lung cancers. If you develop any new symptoms such as shortness of breath, chest pain, or coughing up blood, please call your doctor.  • Please keep in mind that good health involves quitting smoking (for help, call [facility] Quit Tobacco program at [phone]), an annual physical exam, and continued screening with low-dose chest CT.    Thank you for participating in the Lung Cancer Screening program.  If you have any questions about this letter or our program, please call our Nurse Navigator at [phone].    Sincerely,  Shweta Cantor MD, [facility]  Medical Director [facility] Lung Cancer Screening Program

## 2018-09-11 NOTE — TELEPHONE ENCOUNTER
Phoned patient with results of LDCT exam performed 9/10/2018.  Notified him that the results showed very small  nodules.   To monitor for change we recommend a follow-up low-dose chest CT in 6 months and a referral to our Pulmonary Medicine group for evaluation.   Informed of findings of emphysema and a 3 cm abdominal aortic aneurysm with follow-up back to PCP. Pt previously aware of aneurysm and has an appointment with his PCP tomorrow 9/12/2018.  Patient agrees to all recommendations. Referring provider ASHTYN Rodriguez notified of results and incidental findings.  [facility] updated and patient sent lung cancer screening result letter.

## 2018-09-12 ENCOUNTER — OFFICE VISIT (OUTPATIENT)
Dept: MEDICAL GROUP | Facility: PHYSICIAN GROUP | Age: 68
End: 2018-09-12
Payer: MEDICARE

## 2018-09-12 VITALS
HEIGHT: 71 IN | HEART RATE: 71 BPM | RESPIRATION RATE: 18 BRPM | BODY MASS INDEX: 25.4 KG/M2 | DIASTOLIC BLOOD PRESSURE: 56 MMHG | TEMPERATURE: 98.1 F | SYSTOLIC BLOOD PRESSURE: 102 MMHG | WEIGHT: 181.4 LBS | OXYGEN SATURATION: 98 %

## 2018-09-12 DIAGNOSIS — E05.90 HYPERTHYROIDISM: ICD-10-CM

## 2018-09-12 DIAGNOSIS — I48.20 CHRONIC ATRIAL FIBRILLATION (HCC): ICD-10-CM

## 2018-09-12 DIAGNOSIS — J43.9 PULMONARY EMPHYSEMA, UNSPECIFIED EMPHYSEMA TYPE (HCC): ICD-10-CM

## 2018-09-12 DIAGNOSIS — I42.9 CARDIOMYOPATHY, UNSPECIFIED TYPE (HCC): ICD-10-CM

## 2018-09-12 DIAGNOSIS — Z23 NEED FOR VACCINATION: ICD-10-CM

## 2018-09-12 DIAGNOSIS — R91.8 LUNG NODULES: ICD-10-CM

## 2018-09-12 DIAGNOSIS — F17.210 CIGARETTE NICOTINE DEPENDENCE WITHOUT COMPLICATION: ICD-10-CM

## 2018-09-12 DIAGNOSIS — I71.9 AORTIC ANEURYSM WITHOUT RUPTURE, UNSPECIFIED PORTION OF AORTA (HCC): ICD-10-CM

## 2018-09-12 DIAGNOSIS — R05.9 COUGH: ICD-10-CM

## 2018-09-12 PROBLEM — R79.89 ELEVATED SERUM FREE T4 LEVEL: Status: RESOLVED | Noted: 2018-08-08 | Resolved: 2018-09-12

## 2018-09-12 PROBLEM — R79.89 LOW TSH LEVEL: Status: RESOLVED | Noted: 2018-08-08 | Resolved: 2018-09-12

## 2018-09-12 PROCEDURE — 90662 IIV NO PRSV INCREASED AG IM: CPT | Performed by: NURSE PRACTITIONER

## 2018-09-12 PROCEDURE — 99214 OFFICE O/P EST MOD 30 MIN: CPT | Mod: 25 | Performed by: NURSE PRACTITIONER

## 2018-09-12 PROCEDURE — G0008 ADMIN INFLUENZA VIRUS VAC: HCPCS | Performed by: NURSE PRACTITIONER

## 2018-09-12 RX ORDER — RANITIDINE 150 MG/1
150 TABLET ORAL 2 TIMES DAILY
Qty: 60 TAB | Refills: 11 | Status: SHIPPED | OUTPATIENT
Start: 2018-09-12 | End: 2019-10-16 | Stop reason: SDUPTHER

## 2018-09-12 RX ORDER — ALBUTEROL SULFATE 90 UG/1
2 AEROSOL, METERED RESPIRATORY (INHALATION) EVERY 6 HOURS PRN
Qty: 1 INHALER | Refills: 1 | Status: SHIPPED | OUTPATIENT
Start: 2018-09-12 | End: 2022-08-12 | Stop reason: SDUPTHER

## 2018-09-12 RX ORDER — INHALER, ASSIST DEVICES
1 SPACER (EA) MISCELLANEOUS ONCE
Qty: 1 EACH | Refills: 0 | Status: SHIPPED | OUTPATIENT
Start: 2018-09-12 | End: 2018-09-12

## 2018-09-12 NOTE — ASSESSMENT & PLAN NOTE
Patient reports a dry nagging cough daily has not improved with change of lisinopril. He is a smoker.  Patient reports cough is nonproductive, no other concerning symptoms reported to include itchy watery eyes, congestion, sore throat, shortness of breath.    He reports a mild improvement since discontinuing lisinopril although the air quality is poor currently so he still does have a minimal cough.    Patient reports cough is worse when he tries to lay down or lean back in his chair.  He denies any severe heartburn.

## 2018-09-12 NOTE — ASSESSMENT & PLAN NOTE
This is a chronic problem for which patient is followed by Dr. Read's office.  He is prescribed Plavix 75 mg daily.  Patient reports that last time he saw Dr. Read or Dr. Contreras at his office, patient's medications changed, he reports at this time he is not sure what medications were changed.  He does not have his medications with him today.  He does have an appointment tomorrow with cardiology.

## 2018-09-12 NOTE — ASSESSMENT & PLAN NOTE
8/27/2018 9:33 AM    HISTORY/REASON FOR EXAM:  smoker, reports 3 years ago aneurysm possibly  noted?; abdominal aortic aneurysm screening    TECHNIQUE/EXAM DESCRIPTION:  Ultrasound of the aorta.    COMPARISON: None    FINDINGS:  There is moderate atherosclerosis of the visualized abdominal aorta.    Proximal abdominal aorta measures 2.07 cm x 1.88 cm.    Mid abdominal aorta measures 2.36 cm x 2.32 cm.    Distal abdominal aorta measures 2.60 cm x 3.05 cm.    The right common iliac artery measures 1.19 cm x 1.09 cm, the left 0.95 cm x 1.02 cm mm.    No incidental abnormalities in the visualized portions of the retroperitoneum are identified.   Impression         1. Infrarenal aortic aneurysm now measures 3.05 cm.  2. No significant change in iliac caliber.     Minimal change from prior result in 2015. Discussed with patient smoking cessation, will plan to repeat in 12 months.

## 2018-09-12 NOTE — PROGRESS NOTES
Subjective:     Marty Freedman III is a 68 y.o. male here today for evaluation management the following:    Aortic aneurysm (HCC)  8/27/2018 9:33 AM    HISTORY/REASON FOR EXAM:  smoker, reports 3 years ago aneurysm possibly  noted?; abdominal aortic aneurysm screening    TECHNIQUE/EXAM DESCRIPTION:  Ultrasound of the aorta.    COMPARISON: None    FINDINGS:  There is moderate atherosclerosis of the visualized abdominal aorta.    Proximal abdominal aorta measures 2.07 cm x 1.88 cm.    Mid abdominal aorta measures 2.36 cm x 2.32 cm.    Distal abdominal aorta measures 2.60 cm x 3.05 cm.    The right common iliac artery measures 1.19 cm x 1.09 cm, the left 0.95 cm x 1.02 cm mm.    No incidental abnormalities in the visualized portions of the retroperitoneum are identified.   Impression         1. Infrarenal aortic aneurysm now measures 3.05 cm.  2. No significant change in iliac caliber.     Minimal change from prior result in 2015. Discussed with patient smoking cessation, will plan to repeat in 12 months.     Cardiomyopathy ([facility])  Followed by Dr. Read     Chronic atrial fibrillation ([facility])  This is a chronic problem for which patient is followed by Dr. Read's office.  He is prescribed Plavix 75 mg daily.  Patient reports that last time he saw Dr. Read or Dr. Contreras at his office, patient's medications changed, he reports at this time he is not sure what medications were changed.  He does not have his medications with him today.  He does have an appointment tomorrow with cardiology.    Cigarette nicotine dependence without complication  Lung Cancer Screening     Age 55-77 years - 68 y.o.  Current signs/symptoms of lung cancer: No   Previous history of lung cancer: None  Tobacco smoking history of at least 30 pack years:  reports that he has been smoking Cigarettes.  He has a 45.00 pack-year smoking history. He has never used smokeless tobacco.  Current smoker: yes  No chest CT within the last 12 months   Referral placed for  LDCT (DF2205): Yes       Patient did complete lung cancer screening.  1.  Two nodules in the left lung measuring up to 5 mm, not definitively seen previously.  2.  Emphysema. No acute abnormality in the chest.  3.  Small 3 cm abdominal aortic aneurysm, partially visualized.    Based on results we will plan to repeat L DTC in 6 months.              Cough  Patient reports a dry nagging cough daily has not improved with change of lisinopril. He is a smoker.  Patient reports cough is nonproductive, no other concerning symptoms reported to include itchy watery eyes, congestion, sore throat, shortness of breath.    He reports a mild improvement since discontinuing lisinopril although the air quality is poor currently so he still does have a minimal cough.    Patient reports cough is worse when he tries to lay down or lean back in his chair.  He denies any severe heartburn.    Emphysema of lung (HCC)  This is a new problem which was noted on low-dose CT screening.  Patient does admit at times he is shortness of breath with exertion.  He reports that he mostly has shortness of breath while he is here in [place], while he is at the level he does not have any shortness of breath.  He denies any hemoptysis.  He does have a chronic dry cough.  Patient is mostly concerned that he is going to [facility] for a lengthy vacation.  He has never been seen or evaluated by pulmonology.    Hyperthyroidism  Patient is followed by Veronica De La Fuente.   He is now taking methimazole, he has a follow-up appointment on September 20.    Lung nodules  Noted on CT screening, plan to repeat in 6 months, March 2019.       Current medicines (including changes today)  Current Outpatient Prescriptions   Medication Sig Dispense Refill   • albuterol 108 (90 Base) MCG/ACT Aero Soln inhalation aerosol Inhale 2 Puffs by mouth every 6 hours as needed for Shortness of Breath. 1 Inhaler 1   • Spacer/Aero-Holding Chambers (AEROCHAMBER MV) Misc 1 Each by Does  not apply route Once for 1 dose. 1 Each 0   • raNITidine (ZANTAC) 150 MG Tab Take 1 Tab by mouth 2 times a day. 60 Tab 11   • digoxin (LANOXIN) 0.25 MG/ML Solution by Intravenous route every day at 6 PM.     • methimazole (TAPAZOLE) 10 MG Tab Take 1 Tab by mouth 2 times a day. 60 Tab 1   • apixaban (ELIQUIS) 5mg Tab Take 5 mg by mouth 2 Times a Day.     • losartan (COZAAR) 25 MG Tab Take 25 mg by mouth every day.     • carvedilol (COREG) 6.25 MG Tab Take 25 mg by mouth 2 times a day, with meals.     • aspirin EC (ECOTRIN) 81 MG Tablet Delayed Response Take 81 mg by mouth every day.     • magnesium oxide (MAG-OX) 400 MG Tab Take 400 mg by mouth every day.     • pravastatin (PRAVACHOL) 80 MG tablet Take 80 mg by mouth every evening.       No current facility-administered medications for this visit.        He  has a past medical history of Anesthesia; Arthritis; Atrial fibrillation (HCC); Dental disorder; High cholesterol; Hypertension; Myocardial infarct (HCC) (2007); Other and unspecified angina pectoris (10/2016); and Pain.    He  has a past surgical history that includes other orthopedic surgery; inguinal hernia repair bilateral (3/2/2015); and multiple coronary artery bypass (1991).     Social History     Social History   • Marital status:      Spouse name: N/A   • Number of children: N/A   • Years of education: N/A     Social History Main Topics   • Smoking status: Current Every Day Smoker     Packs/day: 1.00     Years: 45.00     Types: Cigarettes   • Smokeless tobacco: Never Used      Comment: since age 11 with a ~10-12 year break; he has cut down most recently to 4-5 day   • Alcohol use 0.6 oz/week     1 Cans of beer per week      Comment: occaisionally   • Drug use: No   • Sexual activity: Not on file     Other Topics Concern   • Not on file     Social History Narrative   • No narrative on file       Family History   Problem Relation Age of Onset   • Stroke Mother    • Heart Attack Mother    • Cancer  "Father    • Stroke Father    • Heart Attack Father    • Heart Attack Paternal Grandmother    • Heart Attack Paternal Grandfather          ROS  Positive for shortness of breath on exertion, cough.  Positive for fatigue and intermittent nausea.   No fever, no chest pain, no abdominal pain, no rashes    All other systems reviewed and are negative.        Objective:     Blood pressure 102/56, pulse 71, temperature 36.7 °C (98.1 °F), resp. rate 18, height 1.803 m (5' 11\"), weight 82.3 kg (181 lb 6.4 oz), SpO2 98 %. Body mass index is 25.3 kg/m².    Physical Exam:   Constitutional: Alert, no distress.  Eye: Equal, round and reactive, conjunctiva clear, lids normal.   ENMT: Lips without lesions, oropharynx clear.   Neck: Trachea midline, no masses, no thyromegaly. No cervical or supraclavicular lymphadenopathy  Respiratory: Unlabored respiratory effort, lungs clear to auscultation, no wheezes, no ronchi.  Cardiovascular: Paced S1, S2, no murmur, 1+ pitting edema LE bilaterally.    Abdomen: Soft, non-tender, no masses, no hepatosplenomegaly. Normal bowel sounds.   Skin: Warm, dry, good turgor, no rashes in visible areas.  Psych: Alert and oriented x3, normal affect and mood.        Assessment and Plan:   The following treatment plan was discussed    1. Pulmonary emphysema, unspecified emphysema type (HCC)  Based on CT scan, smoking history, patient is not currently ready to quit smoking, he also has a cardiac history treated with beta-blocker, patient likely should be evaluated by pulmonology.  At this time I will provide a albuterol inhaler and spacer for patient to utilize as needed for shortness of breath on exertion while he is on his vacation [place] of [place].  We did also discuss the use of case management, patient now has several specialists, not all of which are within the [facility] system and at times he is feeling a bit overwhelmed and fatigued by all this new healthcare.  - REFERRAL TO PULMONOLOGY  - albuterol 108 " (90 Base) MCG/ACT Aero Soln inhalation aerosol; Inhale 2 Puffs by mouth every 6 hours as needed for Shortness of Breath.  Dispense: 1 Inhaler; Refill: 1  - Spacer/Aero-Holding Chambers (AEROCHAMBER MV) Misc; 1 Each by Does not apply route Once for 1 dose.  Dispense: 1 Each; Refill: 0    2. Chronic atrial fibrillation (HCC)  He will f/u with Cardiology as planned, will attempt to obtain records.     3. Cardiomyopathy, unspecified type (HCC)    4. Lung nodules  Plan to repeat LDCT in 6 month.     5. Hyperthyroidism  He will keep his upcoming appt with Endo.     6. Cigarette nicotine dependence without complication  We discussed smoking cessation, discussed options for treatment here.  Patient at this time is not ready to quit smoking, he reports he will let us know when he is ready.    7. Cough  We will trial course of Zantac, GERD? may be exacerbating his cough.    8. Need for vaccination  - INFLUENZA VACCINE, HIGH DOSE (65+ ONLY)    9. Aortic aneurysm without rupture, unspecified portion of aorta (HCC)  Discussed results with patient, answered all questions.  Discussed signs and symptoms seek urgent care.  Again encouraged smoking cessation, will plan to repeat in 12 months for surveillance.    Reviewed risks and benefits of treatment plan. Patient verbally agrees to plan of care.       Followup: Return in about 3 months (around 12/12/2018).    MOIZ Lyons.     PLEASE NOTE: This dictation was created using voice recognition software. I have made every reasonable attempt to correct obvious errors, but I expect that there may be errors of grammar and possibly content that I did not discover prior finalizing this note.

## 2018-09-12 NOTE — ASSESSMENT & PLAN NOTE
Lung Cancer Screening     Age 55-77 years - 68 y.o.  Current signs/symptoms of lung cancer: No   Previous history of lung cancer: None  Tobacco smoking history of at least 30 pack years:  reports that he has been smoking Cigarettes.  He has a 45.00 pack-year smoking history. He has never used smokeless tobacco.  Current smoker: yes  No chest CT within the last 12 months   Referral placed for LDCT ([id number]): Yes       Patient did complete lung cancer screening.  1.  Two nodules in the left lung measuring up to 5 mm, not definitively seen previously.  2.  Emphysema. No acute abnormality in the chest.  3.  Small 3 cm abdominal aortic aneurysm, partially visualized.    Based on results we will plan to repeat L DTC in 6 months.

## 2018-09-12 NOTE — ASSESSMENT & PLAN NOTE
Patient is followed by Veronica De La Fuente.   He is now taking methimazole, he has a follow-up appointment on September 20.

## 2018-09-12 NOTE — ASSESSMENT & PLAN NOTE
This is a new problem which was noted on low-dose CT screening.  Patient does admit at times he is shortness of breath with exertion.  He reports that he mostly has shortness of breath while he is here in [place], while he is at the level he does not have any shortness of breath.  He denies any hemoptysis.  He does have a chronic dry cough.  Patient is mostly concerned that he is going to [facility] for a lengthy vacation.  He has never been seen or evaluated by pulmonology.

## 2018-09-18 ENCOUNTER — OFFICE VISIT (OUTPATIENT)
Dept: PULMONOLOGY | Facility: HOSPICE | Age: 68
End: 2018-09-18
Payer: MEDICARE

## 2018-09-18 VITALS
TEMPERATURE: 97.3 F | SYSTOLIC BLOOD PRESSURE: 122 MMHG | RESPIRATION RATE: 16 BRPM | DIASTOLIC BLOOD PRESSURE: 78 MMHG | HEART RATE: 79 BPM | BODY MASS INDEX: 25.48 KG/M2 | OXYGEN SATURATION: 99 % | WEIGHT: 182 LBS | HEIGHT: 71 IN

## 2018-09-18 DIAGNOSIS — R91.8 LUNG NODULES: ICD-10-CM

## 2018-09-18 DIAGNOSIS — Z72.0 TOBACCO ABUSE: ICD-10-CM

## 2018-09-18 DIAGNOSIS — J43.9 PULMONARY EMPHYSEMA, UNSPECIFIED EMPHYSEMA TYPE (HCC): ICD-10-CM

## 2018-09-18 PROCEDURE — 99204 OFFICE O/P NEW MOD 45 MIN: CPT | Performed by: INTERNAL MEDICINE

## 2018-09-18 NOTE — PROGRESS NOTES
Chief Complaint   Patient presents with   • Establish Care     Referred by ASHTYN Sutherland for Lung Nodules       HPI: This patient is a 68 y.o. Male who is referred to lung nodule clinic from the lung cancer screening program.  He has been a lifelong smoker of a pack per day with 45 pack years to date.  He continues to smoke half a pack daily, and is interested in quitting smoking.  He had a low dose lung cancer screening chest CAT scan on September 10, 2018 which was personally reviewed and showed 2,  <5mm pulmonary nodules in the left lung, as well as emphysematous changes.  He had a former chest CAT scan in 2015 which did not identify the nodules.  He has had increased cough over the past year with clear sputum.  He denies shortness of breath, wheezing, chest tightness or edema.  He denies a history of bronchitis or pneumonia.  He retired as a , with former exposures to wood dust and asbestos tile.      Past Medical History:   Diagnosis Date   • Anesthesia     ponv   • Arthritis     shoulder   • Atrial fibrillation (HCC)    • Chickenpox    • Dental disorder     partial upper, lower   • Divehi measles    • High cholesterol    • Hypertension    • Influenza    • Mumps    • Myocardial infarct (HCC) 2007    x 3   • Other and unspecified angina pectoris 10/2016    Defibulator   • Pain     shoulder       Social History     Social History   • Marital status:      Spouse name: N/A   • Number of children: N/A   • Years of education: N/A     Occupational History   • Not on file.     Social History Main Topics   • Smoking status: Current Every Day Smoker     Packs/day: 0.50     Years: 45.00     Types: Cigarettes   • Smokeless tobacco: Never Used      Comment: since age 11 with a ~10-12 year break; he has cut down most recently to 4-5 day   • Alcohol use 0.6 oz/week     1 Cans of beer per week      Comment: occaisionally   • Drug use: No   • Sexual activity: Not on file     Other Topics Concern   •  Not on file     Social History Narrative   • No narrative on file       Family History   Problem Relation Age of Onset   • Heart Attack Mother    • Cancer Father    • Heart Attack Father    • Heart Attack Paternal Grandmother    • Heart Attack Paternal Grandfather        Current Outpatient Prescriptions on File Prior to Visit   Medication Sig Dispense Refill   • albuterol 108 (90 Base) MCG/ACT Aero Soln inhalation aerosol Inhale 2 Puffs by mouth every 6 hours as needed for Shortness of Breath. 1 Inhaler 1   • raNITidine (ZANTAC) 150 MG Tab Take 1 Tab by mouth 2 times a day. 60 Tab 11   • methimazole (TAPAZOLE) 10 MG Tab Take 1 Tab by mouth 2 times a day. 60 Tab 1   • apixaban (ELIQUIS) 5mg Tab Take 5 mg by mouth 2 Times a Day.     • carvedilol (COREG) 6.25 MG Tab Take 25 mg by mouth 2 times a day, with meals.     • aspirin EC (ECOTRIN) 81 MG Tablet Delayed Response Take 81 mg by mouth every day.     • magnesium oxide (MAG-OX) 400 MG Tab Take 400 mg by mouth every day.     • pravastatin (PRAVACHOL) 80 MG tablet Take 80 mg by mouth every evening.     • digoxin (LANOXIN) 0.25 MG/ML Solution by Intravenous route every day at 6 PM.     • losartan (COZAAR) 25 MG Tab Take 25 mg by mouth every day.       No current facility-administered medications on file prior to visit.        Allergies: Patient has no known allergies.    ROS:   Constitutional: Denies fevers, chills, night sweats, fatigue or weight loss  Eyes: Denies vision loss, pain, drainage, double vision  Ears, Nose, Throat: Denies earache, difficulty hearing, tinnitus, nasal congestion, hoarseness  Cardiovascular: Denies chest pain, tightness, palpitations, orthopnea or edema  Respiratory: Denies shortness of breath, cough, wheezing, hemoptysis  Sleep: Denies daytime sleepiness, snoring, apneas, insomnia, morning headaches  GI: Denies heartburn, dysphagia, nausea, abdominal pain, diarrhea or constipation  : Denies frequent urination, hematuria, discharge or  "painful urination  Musculoskeletal: Denies back pain, painful joints, sore muscles  Neurological: Denies weakness or headaches  Skin: No rashes    Blood pressure 122/78, pulse 79, temperature 36.3 °C (97.3 °F), resp. rate 16, height 1.803 m (5' 11\"), weight 82.6 kg (182 lb), SpO2 99 %.    Physical Exam:  Appearance: Well-nourished, well-developed, in no acute distress  HEENT: Normocephalic, atraumatic, white sclera, PERRLA, oropharynx clear  Neck: No adenopathy or masses  Respiratory: no intercostal retractions or accessory muscle use  Lungs auscultation: Clear to auscultation bilaterally  Cardiovascular: Regular rate rhythm. No murmurs, rubs or gallops.  No LE edema  Abdomen: soft, nondistended  Gait: Normal  Digits: No clubbing, cyanosis  Motor: No focal deficits  Orientation: Oriented to time, person and place    Diagnosis:  1. Lung nodules  AMB PULMONARY FUNCTION TEST/LAB   2. Tobacco abuse     3. Pulmonary emphysema, unspecified emphysema type (HCC)         Plan:  The patient's low-dose lung cancer screening chest CAT scan identified 2 pulmonary micronodules in the left lung, likely postinflammatory.  The nodules are too small for biopsy or PET resolution. Malignancy risk factors include ongoing tobacco abuse.  Recommend chest CAT scan without contrast in 6 months for follow-up.  Smoking cessation counseling provided.  Pulmonary function testing for screening of COPD.  Start albuterol HFA 1-2 puffs every 4 hours as needed.  Inhaler instructions provided.  Return in about 3 months (around 12/18/2018) for with PFT.      "

## 2018-09-18 NOTE — PATIENT INSTRUCTIONS
"Smoking Cessation, Tips for Success  If you are ready to quit smoking, congratulations! You have chosen to help yourself be healthier. Cigarettes bring nicotine, tar, carbon monoxide, and other irritants into your body. Your lungs, heart, and blood vessels will be able to work better without these poisons. There are many different ways to quit smoking. Nicotine gum, nicotine patches, a nicotine inhaler, or nicotine nasal spray can help with physical craving. Hypnosis, support groups, and medicines help break the habit of smoking.  WHAT THINGS CAN I DO TO MAKE QUITTING EASIER?   Here are some tips to help you quit for good:  · Pick a date when you will quit smoking completely. Tell all of your friends and family about your plan to quit on that date.  · Do not try to slowly cut down on the number of cigarettes you are smoking. Pick a quit date and quit smoking completely starting on that day.  · Throw away all cigarettes.    · Clean and remove all ashtrays from your home, work, and car.  · On a card, write down your reasons for quitting. Carry the card with you and read it when you get the urge to smoke.  · Cleanse your body of nicotine. Drink enough water and fluids to keep your urine clear or pale yellow. Do this after quitting to flush the nicotine from your body.  · Learn to predict your moods. Do not let a bad situation be your excuse to have a cigarette. Some situations in your life might tempt you into wanting a cigarette.  · Never have \"just one\" cigarette. It leads to wanting another and another. Remind yourself of your decision to quit.  · Change habits associated with smoking. If you smoked while driving or when feeling stressed, try other activities to replace smoking. Stand up when drinking your coffee. Brush your teeth after eating. Sit in a different chair when you read the paper. Avoid alcohol while trying to quit, and try to drink fewer caffeinated beverages. Alcohol and caffeine may urge you to " "smoke.  · Avoid foods and drinks that can trigger a desire to smoke, such as sugary or spicy foods and alcohol.  · Ask people who smoke not to smoke around you.  · Have something planned to do right after eating or having a cup of coffee. For example, plan to take a walk or exercise.  · Try a relaxation exercise to calm you down and decrease your stress. Remember, you may be tense and nervous for the first 2 weeks after you quit, but this will pass.  · Find new activities to keep your hands busy. Play with a pen, coin, or rubber band. Doodle or draw things on paper.  · Brush your teeth right after eating. This will help cut down on the craving for the taste of tobacco after meals. You can also try mouthwash.    · Use oral substitutes in place of cigarettes. Try using lemon drops, carrots, cinnamon sticks, or chewing gum. Keep them handy so they are available when you have the urge to smoke.  · When you have the urge to smoke, try deep breathing.  · Designate your home as a nonsmoking area.  · If you are a heavy smoker, ask your health care provider about a prescription for nicotine chewing gum. It can ease your withdrawal from nicotine.  · Reward yourself. Set aside the cigarette money you save and buy yourself something nice.  · Look for support from others. Join a support group or smoking cessation program. Ask someone at home or at work to help you with your plan to quit smoking.  · Always ask yourself, \"Do I need this cigarette or is this just a reflex?\" Tell yourself, \"Today, I choose not to smoke,\" or \"I do not want to smoke.\" You are reminding yourself of your decision to quit.  · Do not replace cigarette smoking with electronic cigarettes (commonly called e-cigarettes). The safety of e-cigarettes is unknown, and some may contain harmful chemicals.  · If you relapse, do not give up! Plan ahead and think about what you will do the next time you get the urge to smoke.  HOW WILL I FEEL WHEN I QUIT SMOKING?  You " may have symptoms of withdrawal because your body is used to nicotine (the addictive substance in cigarettes). You may crave cigarettes, be irritable, feel very hungry, cough often, get headaches, or have difficulty concentrating. The withdrawal symptoms are only temporary. They are strongest when you first quit but will go away within 10-14 days. When withdrawal symptoms occur, stay in control. Think about your reasons for quitting. Remind yourself that these are signs that your body is healing and getting used to being without cigarettes. Remember that withdrawal symptoms are easier to treat than the major diseases that smoking can cause.   Even after the withdrawal is over, expect periodic urges to smoke. However, these cravings are generally short lived and will go away whether you smoke or not. Do not smoke!  WHAT RESOURCES ARE AVAILABLE TO HELP ME QUIT SMOKING?  Your health care provider can direct you to community resources or hospitals for support, which may include:  · Group support.  · Education.  · Hypnosis.  · Therapy.     This information is not intended to replace advice given to you by your health care provider. Make sure you discuss any questions you have with your health care provider.     Document Released: 09/15/2005 Document Revised: 01/08/2016 Document Reviewed: 06/05/2014  [employer] Patient Education ©2016 [employer]

## 2018-09-20 ENCOUNTER — HOSPITAL ENCOUNTER (OUTPATIENT)
Dept: LAB | Facility: MEDICAL CENTER | Age: 68
End: 2018-09-20
Attending: PHYSICIAN ASSISTANT
Payer: MEDICARE

## 2018-09-20 ENCOUNTER — OFFICE VISIT (OUTPATIENT)
Dept: ENDOCRINOLOGY | Facility: MEDICAL CENTER | Age: 68
End: 2018-09-20
Payer: MEDICARE

## 2018-09-20 VITALS
BODY MASS INDEX: 25.17 KG/M2 | HEIGHT: 71 IN | DIASTOLIC BLOOD PRESSURE: 64 MMHG | SYSTOLIC BLOOD PRESSURE: 110 MMHG | OXYGEN SATURATION: 97 % | WEIGHT: 179.8 LBS | HEART RATE: 76 BPM

## 2018-09-20 DIAGNOSIS — E05.90 HYPERTHYROIDISM: ICD-10-CM

## 2018-09-20 DIAGNOSIS — E06.1 SUBACUTE THYROIDITIS: ICD-10-CM

## 2018-09-20 LAB
T3 SERPL-MCNC: 152.4 NG/DL (ref 60–181)
T3FREE SERPL-MCNC: 6.55 PG/ML (ref 2.4–4.2)
T4 FREE SERPL-MCNC: 3.61 NG/DL (ref 0.53–1.43)
THYROPEROXIDASE AB SERPL-ACNC: 1.9 IU/ML (ref 0–9)
TSH SERPL DL<=0.005 MIU/L-ACNC: <0.005 UIU/ML (ref 0.38–5.33)

## 2018-09-20 PROCEDURE — 99214 OFFICE O/P EST MOD 30 MIN: CPT | Performed by: PHYSICIAN ASSISTANT

## 2018-09-20 PROCEDURE — 86376 MICROSOMAL ANTIBODY EACH: CPT

## 2018-09-20 PROCEDURE — 84432 ASSAY OF THYROGLOBULIN: CPT

## 2018-09-20 PROCEDURE — 84480 ASSAY TRIIODOTHYRONINE (T3): CPT

## 2018-09-20 PROCEDURE — 84443 ASSAY THYROID STIM HORMONE: CPT

## 2018-09-20 PROCEDURE — 86800 THYROGLOBULIN ANTIBODY: CPT

## 2018-09-20 PROCEDURE — 83520 IMMUNOASSAY QUANT NOS NONAB: CPT

## 2018-09-20 PROCEDURE — 84439 ASSAY OF FREE THYROXINE: CPT

## 2018-09-20 PROCEDURE — 36415 COLL VENOUS BLD VENIPUNCTURE: CPT

## 2018-09-20 PROCEDURE — 84481 FREE ASSAY (FT-3): CPT

## 2018-09-20 NOTE — PROGRESS NOTES
Endocrinology Clinic Progress Note  PCP: ROSALIA Lyons    HPI:  Marty Freedman III is a 68 y.o. old patient who comes in today for review of endocrine problems.    There are no diagnoses linked to this encounter.    Endocrine history to date:       ROS:  Constitutional: No weight loss or gain,  fever  HEENT: No difficulty with swallowing, change in voice, or swelling in throat area   Cardiac: No chest pain, palpitations, or racing heart  Resp: No shortness of breath  GI: No abdominal pain, nausea, vomiting, or diarrhea   Neuro: No numbness or tinging in feet  Endo: No heat or cold intolerance, no polyuria or polydipsia      Past Medical History:  Patient Active Problem List    Diagnosis Date Noted   • Emphysema of lung (HCC) 09/12/2018   • Lung nodules 09/12/2018   • Aortic aneurysm (HCC) 09/12/2018   • Hyperthyroidism 08/16/2018   • Cardiomyopathy (HCC) 08/08/2018   • Essential hypertension 07/26/2018   • History of MI (myocardial infarction) 07/26/2018   • Left elbow pain 07/26/2018   • Cigarette nicotine dependence without complication 07/26/2018   • Cough 07/26/2018   • Pacemaker 07/26/2018   • Chronic atrial fibrillation (HCC) 07/26/2018   • Mixed hyperlipidemia 07/26/2018       Medications:    Current Outpatient Prescriptions:   •  albuterol 108 (90 Base) MCG/ACT Aero Soln inhalation aerosol, Inhale 2 Puffs by mouth every 6 hours as needed for Shortness of Breath., Disp: 1 Inhaler, Rfl: 1  •  raNITidine (ZANTAC) 150 MG Tab, Take 1 Tab by mouth 2 times a day., Disp: 60 Tab, Rfl: 11  •  methimazole (TAPAZOLE) 10 MG Tab, Take 1 Tab by mouth 2 times a day., Disp: 60 Tab, Rfl: 1  •  apixaban (ELIQUIS) 5mg Tab, Take 5 mg by mouth 2 Times a Day., Disp: , Rfl:   •  carvedilol (COREG) 6.25 MG Tab, Take 25 mg by mouth 2 times a day, with meals., Disp: , Rfl:   •  aspirin EC (ECOTRIN) 81 MG Tablet Delayed Response, Take 81 mg by mouth every day., Disp: , Rfl:   •  magnesium oxide (MAG-OX) 400 MG Tab, Take  "400 mg by mouth every day., Disp: , Rfl:   •  pravastatin (PRAVACHOL) 80 MG tablet, Take 80 mg by mouth every evening., Disp: , Rfl:   •  digoxin (LANOXIN) 0.25 MG/ML Solution, by Intravenous route every day at 6 PM., Disp: , Rfl:   •  losartan (COZAAR) 25 MG Tab, Take 25 mg by mouth every day., Disp: , Rfl:     Labs: Reviewed as above     Physical Examination:  Vital signs: /64 (BP Location: Right leg, Patient Position: Sitting, BP Cuff Size: Adult)   Pulse 76   Ht 1.803 m (5' 11\")   Wt 81.6 kg (179 lb 12.8 oz)   SpO2 97%   BMI 25.08 kg/m²  Body mass index is 25.08 kg/m².  General: No apparent distress, cooperative  Eyes: No scleral icterus, no discharge, normal eyelids  Neck: No abnormal masses on inspection, normal thyroid exam  Resp: Normal effort, clear to auscultation bilaterally  CVS: Regular rate and rhythm, S1 S2 normal, no murmur  Extremities: No lower extremity edema  Abdomen: abdominal obesity present  Musculoskeletal: Normal digits and nails  Skin: No rash on visible skin  Psych: Alert and oriented, normal mood and affect, intact memory and able to make informed decisions.    Assessment and Plan:    There are no diagnoses linked to this encounter.    No Follow-up on file.    Thank you for allowing me to participate in the care of this patient.  If you have any questions or concerns please do not hesitate to contact me.    Veronica Holden P.A.-C.    CC:   KATHY Lyons.[email or web address].    This note was created using voice recognition software (Dragon). The accuracy of the dictation is limited by the abilities of the software. I have reviewed the note prior to signing, however some errors in grammar and context are still possible. If you have any questions related to this note please do not hesitate to contact our office.   "

## 2018-09-21 PROBLEM — E06.1 SUBACUTE THYROIDITIS: Status: ACTIVE | Noted: 2018-09-21

## 2018-09-21 LAB
THYROGLOB AB SERPL-ACNC: <0.9 IU/ML (ref 0–4)
THYROGLOB SERPL-MCNC: 163.6 NG/ML (ref 1.3–31.8)
THYROGLOB SERPL-MCNC: ABNORMAL NG/ML (ref 1.3–31.8)
TSH RECEP AB SER-ACNC: <0.9 IU/L

## 2018-09-21 NOTE — PROGRESS NOTES
Endocrinology Clinic Progress Note  PCP: ROSALIA Lyons    HPI:  Marty Freedman III is a 68 y.o. old patient who comes in today for review of endocrine problems and recent 5 hour thyroid scan and uptake.  Patient underwent NM thyroid uptake and scan without any difficulties.  He did stop his methimazole for approximately 5-6 days prior to the scan.  He restarted methimazole twice daily after the scan.  However he did not have his labs done at the time of the scan as previously instructed.     He is not feeling well today however over the last several weeks has had a noticed improvement in his symptoms.  His medications i.e. Coreg was increased to twice daily.    He continues to have intermittent fatigue however feeling shaky and jittery with a tremor has improved.     1. Hyperthyroidism/Subacute thyroiditis         Endocrine history to date:   Hyperthyroidism: Started on Methimazole 10 mg BID (8/16/18)    8/28/18 NM 5 hour uptake scan- consistent with thyroiditis.  Low I 123 5 hour uptake thyroid    scan demonstrates barely visible thyroid tissue    Labs: 7/31/18- TSH <0.005 FT4 5.06   2/2015- TSH 2.270     ROS:  Constitutional: No weight loss or gain,  fever  HEENT: No difficulty with swallowing, change in voice, or swelling in throat area   Cardiac: No chest pain, palpitations, or racing heart  Resp: No shortness of breath  GI: No abdominal pain, nausea, vomiting, or diarrhea   Neuro: No numbness or tinging in feet  Endo: No heat or cold intolerance, no polyuria or polydipsia      Past Medical History:  Patient Active Problem List    Diagnosis Date Noted   • Emphysema of lung (HCC) 09/12/2018   • Lung nodules 09/12/2018   • Aortic aneurysm (HCC) 09/12/2018   • Hyperthyroidism 08/16/2018   • Cardiomyopathy (HCC) 08/08/2018   • Essential hypertension 07/26/2018   • History of MI (myocardial infarction) 07/26/2018   • Left elbow pain 07/26/2018   • Cigarette nicotine dependence without complication  "07/26/2018   • Cough 07/26/2018   • Pacemaker 07/26/2018   • Chronic atrial fibrillation (HCC) 07/26/2018   • Mixed hyperlipidemia 07/26/2018       Medications:    Current Outpatient Prescriptions:   •  albuterol 108 (90 Base) MCG/ACT Aero Soln inhalation aerosol, Inhale 2 Puffs by mouth every 6 hours as needed for Shortness of Breath., Disp: 1 Inhaler, Rfl: 1  •  raNITidine (ZANTAC) 150 MG Tab, Take 1 Tab by mouth 2 times a day., Disp: 60 Tab, Rfl: 11  •  methimazole (TAPAZOLE) 10 MG Tab, Take 1 Tab by mouth 2 times a day., Disp: 60 Tab, Rfl: 1  •  apixaban (ELIQUIS) 5mg Tab, Take 5 mg by mouth 2 Times a Day., Disp: , Rfl:   •  carvedilol (COREG) 6.25 MG Tab, Take 25 mg by mouth 2 times a day, with meals., Disp: , Rfl:   •  aspirin EC (ECOTRIN) 81 MG Tablet Delayed Response, Take 81 mg by mouth every day., Disp: , Rfl:   •  magnesium oxide (MAG-OX) 400 MG Tab, Take 400 mg by mouth every day., Disp: , Rfl:   •  pravastatin (PRAVACHOL) 80 MG tablet, Take 80 mg by mouth every evening., Disp: , Rfl:   •  digoxin (LANOXIN) 0.25 MG/ML Solution, by Intravenous route every day at 6 PM., Disp: , Rfl:   •  losartan (COZAAR) 25 MG Tab, Take 25 mg by mouth every day., Disp: , Rfl:     Labs: Reviewed as above     Physical Examination:  Vital signs: /64 (BP Location: Right leg, Patient Position: Sitting, BP Cuff Size: Adult)   Pulse 76   Ht 1.803 m (5' 11\")   Wt 81.6 kg (179 lb 12.8 oz)   SpO2 97%   BMI 25.08 kg/m²  Body mass index is 25.08 kg/m².  General: No apparent distress, cooperative  Eyes: No scleral icterus, no discharge, normal eyelids  Resp: Normal effort, clear to auscultation bilaterally  CVS: Regular rate and rhythm, S1 S2 normal, no murmur  Musculoskeletal: Normal digits and nails  Skin: No rash on visible skin  Psych: Alert and oriented, normal mood and affect, intact memory and able to make informed decisions.    Assessment and Plan:    1. SubAcute Thyroiditis: (Hyperthyroidism) -we have reviewed his " thyroid scan at this time.  He discontinue the use of his methimazole for approximately 5 days prior to testing.  However he did not have laboratory work done 1-2 days prior as instructed.  I have sent him today to get thyroid panels and I will contact him with the results.  If his TSH continues to be consistent with thyroiditis and I will discontinue the use of methimazole at that time.  He is currently on max dose of beta-blocker therapy (as prescribed by cardiology).     He is leaving for [facility] in the next week or so.     9/21/18-reviewed labs with the patient.  It is consistent with thyroiditis.  His TSH remains to be less than 0.005 however free T4 and free T3 are improving. We will continue to monitor symptoms and TSH.      He will repeat labs upon returning home from [facility]       Return in about 4 weeks (around 10/18/2018).    Thank you for allowing me to participate in the care of this patient.  If you have any questions or concerns please do not hesitate to contact me.    Veronica Holden P.A.-C.    CC:   MOIZ Lyons.    This note was created using voice recognition software (Dragon). The accuracy of the dictation is limited by the abilities of the software. I have reviewed the note prior to signing, however some errors in grammar and context are still possible. If you have any questions related to this note please do not hesitate to contact our office.

## 2018-10-17 RX ORDER — HYDROCHLOROTHIAZIDE 25 MG/1
25 TABLET ORAL DAILY
COMMUNITY
End: 2020-12-03

## 2018-10-17 RX ORDER — DILTIAZEM HYDROCHLORIDE 120 MG/1
120 TABLET, FILM COATED ORAL 3 TIMES DAILY
COMMUNITY
End: 2020-12-03

## 2018-10-17 NOTE — OR NURSING
Dr Soni notified of patient's medical history and medications. Pacemaker/Defibrillator information sheet faxed to Dr Read's office to be completed. Dr Soni responded to have Dr Horvath office get pacemaker information to us for the chart as it is part of the patient's workup. Called Dr Alexandru alvarez and left message with Valerie regarding Dr Soni requesting them to obtain pacemaker information from Dr Read's office.

## 2018-10-17 NOTE — OR NURSING
PreAdmit Appointment: Patient given Preparing for your procedure handout. Patient instructed to continue regularly prescribed medications through day before surgery. Instructed to take the following medications the day of surgery with a sip of water per Anesthesia protocol: Carvedilol, Diltiazem, Zantac, Albuterol if needed. Pt states he was instructed to hold blood thinner prior to surgery by doctor. Instructed pt to follow Dr Horvath bowel prep instructions.

## 2018-10-18 ENCOUNTER — HOSPITAL ENCOUNTER (OUTPATIENT)
Facility: MEDICAL CENTER | Age: 68
End: 2018-10-18
Attending: INTERNAL MEDICINE | Admitting: INTERNAL MEDICINE
Payer: MEDICARE

## 2018-10-18 VITALS
BODY MASS INDEX: 24.92 KG/M2 | HEART RATE: 82 BPM | SYSTOLIC BLOOD PRESSURE: 131 MMHG | OXYGEN SATURATION: 97 % | TEMPERATURE: 97.9 F | HEIGHT: 71 IN | WEIGHT: 178 LBS | DIASTOLIC BLOOD PRESSURE: 81 MMHG | RESPIRATION RATE: 16 BRPM

## 2018-10-18 LAB
ANION GAP SERPL CALC-SCNC: 8 MMOL/L (ref 0–11.9)
BASOPHILS # BLD AUTO: 0.4 % (ref 0–1.8)
BASOPHILS # BLD: 0.03 K/UL (ref 0–0.12)
BUN SERPL-MCNC: 11 MG/DL (ref 8–22)
CALCIUM SERPL-MCNC: 9.5 MG/DL (ref 8.4–10.2)
CHLORIDE SERPL-SCNC: 99 MMOL/L (ref 96–112)
CO2 SERPL-SCNC: 27 MMOL/L (ref 20–33)
CREAT SERPL-MCNC: 0.88 MG/DL (ref 0.5–1.4)
EOSINOPHIL # BLD AUTO: 0.15 K/UL (ref 0–0.51)
EOSINOPHIL NFR BLD: 1.8 % (ref 0–6.9)
ERYTHROCYTE [DISTWIDTH] IN BLOOD BY AUTOMATED COUNT: 45.4 FL (ref 35.9–50)
GLUCOSE SERPL-MCNC: 90 MG/DL (ref 65–99)
HCT VFR BLD AUTO: 43.7 % (ref 42–52)
HGB BLD-MCNC: 14.9 G/DL (ref 14–18)
IMM GRANULOCYTES # BLD AUTO: 0.03 K/UL (ref 0–0.11)
IMM GRANULOCYTES NFR BLD AUTO: 0.4 % (ref 0–0.9)
LYMPHOCYTES # BLD AUTO: 2.26 K/UL (ref 1–4.8)
LYMPHOCYTES NFR BLD: 27.2 % (ref 22–41)
MCH RBC QN AUTO: 32.7 PG (ref 27–33)
MCHC RBC AUTO-ENTMCNC: 34.1 G/DL (ref 33.7–35.3)
MCV RBC AUTO: 96 FL (ref 81.4–97.8)
MONOCYTES # BLD AUTO: 0.54 K/UL (ref 0–0.85)
MONOCYTES NFR BLD AUTO: 6.5 % (ref 0–13.4)
NEUTROPHILS # BLD AUTO: 5.31 K/UL (ref 1.82–7.42)
NEUTROPHILS NFR BLD: 63.7 % (ref 44–72)
NRBC # BLD AUTO: 0 K/UL
NRBC BLD-RTO: 0 /100 WBC
PATHOLOGY CONSULT NOTE: NORMAL
PLATELET # BLD AUTO: 162 K/UL (ref 164–446)
PMV BLD AUTO: 11.2 FL (ref 9–12.9)
POTASSIUM SERPL-SCNC: 3.3 MMOL/L (ref 3.6–5.5)
RBC # BLD AUTO: 4.55 M/UL (ref 4.7–6.1)
SODIUM SERPL-SCNC: 134 MMOL/L (ref 135–145)
WBC # BLD AUTO: 8.3 K/UL (ref 4.8–10.8)

## 2018-10-18 PROCEDURE — 88305 TISSUE EXAM BY PATHOLOGIST: CPT | Mod: 59

## 2018-10-18 PROCEDURE — 160009 HCHG ANES TIME/MIN: Performed by: INTERNAL MEDICINE

## 2018-10-18 PROCEDURE — 700111 HCHG RX REV CODE 636 W/ 250 OVERRIDE (IP)

## 2018-10-18 PROCEDURE — 160025 RECOVERY II MINUTES (STATS): Performed by: INTERNAL MEDICINE

## 2018-10-18 PROCEDURE — 160046 HCHG PACU - 1ST 60 MINS PHASE II: Performed by: INTERNAL MEDICINE

## 2018-10-18 PROCEDURE — 85025 COMPLETE CBC W/AUTO DIFF WBC: CPT

## 2018-10-18 PROCEDURE — 160035 HCHG PACU - 1ST 60 MINS PHASE I: Performed by: INTERNAL MEDICINE

## 2018-10-18 PROCEDURE — 160048 HCHG OR STATISTICAL LEVEL 1-5: Performed by: INTERNAL MEDICINE

## 2018-10-18 PROCEDURE — 160002 HCHG RECOVERY MINUTES (STAT): Performed by: INTERNAL MEDICINE

## 2018-10-18 PROCEDURE — 160204 HCHG ENDO MINUTES - 1ST 30 MINS LEVEL 5: Performed by: INTERNAL MEDICINE

## 2018-10-18 PROCEDURE — 700101 HCHG RX REV CODE 250

## 2018-10-18 PROCEDURE — 80048 BASIC METABOLIC PNL TOTAL CA: CPT

## 2018-10-18 RX ORDER — ONDANSETRON 2 MG/ML
4 INJECTION INTRAMUSCULAR; INTRAVENOUS
Status: DISCONTINUED | OUTPATIENT
Start: 2018-10-18 | End: 2018-10-18 | Stop reason: HOSPADM

## 2018-10-18 RX ORDER — LIDOCAINE HYDROCHLORIDE 10 MG/ML
INJECTION, SOLUTION INFILTRATION; PERINEURAL
Status: COMPLETED
Start: 2018-10-18 | End: 2018-10-18

## 2018-10-18 RX ORDER — SODIUM CHLORIDE, SODIUM LACTATE, POTASSIUM CHLORIDE, CALCIUM CHLORIDE 600; 310; 30; 20 MG/100ML; MG/100ML; MG/100ML; MG/100ML
INJECTION, SOLUTION INTRAVENOUS CONTINUOUS
Status: DISCONTINUED | OUTPATIENT
Start: 2018-10-18 | End: 2018-10-18 | Stop reason: HOSPADM

## 2018-10-18 RX ORDER — DIPHENHYDRAMINE HYDROCHLORIDE 50 MG/ML
12.5 INJECTION INTRAMUSCULAR; INTRAVENOUS
Status: DISCONTINUED | OUTPATIENT
Start: 2018-10-18 | End: 2018-10-18 | Stop reason: HOSPADM

## 2018-10-18 RX ORDER — HALOPERIDOL 5 MG/ML
1 INJECTION INTRAMUSCULAR
Status: DISCONTINUED | OUTPATIENT
Start: 2018-10-18 | End: 2018-10-18 | Stop reason: HOSPADM

## 2018-10-18 RX ORDER — MEPERIDINE HYDROCHLORIDE 25 MG/ML
6.25 INJECTION INTRAMUSCULAR; INTRAVENOUS; SUBCUTANEOUS
Status: DISCONTINUED | OUTPATIENT
Start: 2018-10-18 | End: 2018-10-18 | Stop reason: HOSPADM

## 2018-10-18 RX ORDER — SODIUM CHLORIDE, SODIUM LACTATE, POTASSIUM CHLORIDE, CALCIUM CHLORIDE 600; 310; 30; 20 MG/100ML; MG/100ML; MG/100ML; MG/100ML
INJECTION, SOLUTION INTRAVENOUS ONCE
Status: COMPLETED | OUTPATIENT
Start: 2018-10-18 | End: 2018-10-18

## 2018-10-18 RX ORDER — LABETALOL HYDROCHLORIDE 5 MG/ML
5 INJECTION, SOLUTION INTRAVENOUS
Status: DISCONTINUED | OUTPATIENT
Start: 2018-10-18 | End: 2018-10-18 | Stop reason: HOSPADM

## 2018-10-18 RX ADMIN — LIDOCAINE HYDROCHLORIDE: 10 INJECTION, SOLUTION INFILTRATION; PERINEURAL at 12:42

## 2018-10-18 RX ADMIN — SODIUM CHLORIDE, SODIUM LACTATE, POTASSIUM CHLORIDE, CALCIUM CHLORIDE: 600; 310; 30; 20 INJECTION, SOLUTION INTRAVENOUS at 12:52

## 2018-10-18 ASSESSMENT — PAIN SCALES - GENERAL
PAINLEVEL_OUTOF10: 0

## 2018-10-18 NOTE — DISCHARGE INSTRUCTIONS
ENDOSCOPY HOME CARE INSTRUCTIONS        COLONOSCOPY OR FLEXIBLE SIGMOIDOSCOPY  1. If you received a barium enema, take a mild laxative such as dulcolax, Jennifer's M.O., or Milk of Magnesia to clean out the barium.  2. Drink plenty of fluids. Eat a diet high in fiber (whole-grain breads, fresh fruit and vegetables).  3. You may notice a few drops of blood with your first bowel movement. If you develop any large amount of bleeding, black stools, a fever, or abdominal pain, call your doctor right away.  4. Call your doctor for test results.  5. Don't drive or drink alcohol for 24 hours. The medication you received will make you too drowsy.  6. No heavy lifting, no Aspirin products (ibuprofin/motrin)  or Aspirin for 5 days   7. Additional instructions: Resume your anticoagulation medication tomorrow.      You should call 911 if you develop problems with breathing or chest pain.  If any questions arise, call your doctor. If your doctor is not available, please feel free to call [phone]. You can also call the HEALTH HOTLINE open 24 hours/day, 7 days/week and speak to a nurse at [phone], or toll free [phone].    Depression / Suicide Risk    As you are discharged from this [facility] facility, it is important to learn how to keep safe from harming yourself.    Recognize the warning signs:  · Abrupt changes in personality, positive or negative- including increase in energy   · Giving away possessions  · Change in eating patterns- significant weight changes-  positive or negative  · Change in sleeping patterns- unable to sleep or sleeping all the time   · Unwillingness or inability to communicate  · Depression  · Unusual sadness, discouragement and loneliness  · Talk of wanting to die  · Neglect of personal appearance   · Rebelliousness- reckless behavior  · Withdrawal from people/activities they love  · Confusion- inability to concentrate     If you or a loved one observes any of these behaviors or has  concerns about self-harm, here's what you can do:  · Talk about it- your feelings and reasons for harming yourself  · Remove any means that you might use to hurt yourself (examples: pills, rope, extension cords, firearm)  · Get professional help from the community (Mental Health, Substance Abuse, psychological counseling)  · Do not be alone:Call your Safe Contact- someone whom you trust who will be there for you.  · Call your local CRISIS HOTLINE 552-5464 or 460-642-6729  · Call your local Children's Mobile Crisis Response Team [place] (013) 871-7037 or www."[employer]  · Call the toll free National Suicide Prevention Hotlines   · National Suicide Prevention Lifeline 654-613-XGZI (7467)  · National Hope Line Network 800-SUICIDE (465-9399)    I acknowledge receipt and understanding of these Home Care Instructions.

## 2018-10-18 NOTE — OR NURSING
Pacemaker info sheet received and faxed to preop holding, left message with patient  And Valerie at Dr Horvath that information received from cardiologist,  Spoke to Scarlet in preop that pacemaker sheet faxed to preop

## 2018-10-18 NOTE — OP REPORT
DATE OF SERVICE:  10/18/2018     INDICATION FOR PROCEDURE:  screening for colon cancer    PROCEDURE PERFORMED: Colonoscopy with polypectomy     CONSENT:  Informed consent was obtained directly from the patient after benefits, risks and possible alternatives were discussed.     MEDICATIONS:  The patient received Propofol for this procedure. Please see the anesthesiologist report for details     PROCEDURE DESCRIPTION:  The patient was placed in the left lateral decubitus position and provided with supplemental oxygen via nasal cannula.  Vital signs were monitored continuously throughout the procedure. After appropriate sedation, ELIAS was performed. The colonoscopy was then introduced into the rectum through the anus. The colonoscope was advanced through the colon under direct visualization to the cecum. The scope was then withdrawn and mucosa examined. Retroflexion was performed in the rectum. Patient toleration of this procedure was excellent     FINDINGS:    1)  Four 6-8mm sessile polyps were found in the transverse colon and removed with cold snare  2) a 10mm pedunculated polyp was found in the descending colon and removed with cold snare  3) severe diverticulosis was seen  4) large non-bleeding internal hemorrhoids were seen        COMPLICATIONS:  No complications or blood loss during or in the immediate postoperative period.     IMPRESSION:  1. Multiple colon polyps  2. Diverticulosis  3. Internal hemorrhoids     RECOMMENDATION:    1) f/u pathology  2) repeat colonoscopy in 3 years  3) restart anticoagulation tomorrow  4) discharge home today

## 2018-10-18 NOTE — OR NURSING
1338: To PACU from OR via rney. Respirations spontaneous and non-labored on 3L O2 via nasal canula. Bowel sounds hypoactive in all 4 quadrants.    1345:  Patient has no c/o pain or nausea.     1400:  No change.  Patient meets criteria to transfer to stage 2.    1403:  Report to ELLIS Krishna.

## 2018-10-18 NOTE — OR NURSING
1408: Patient to stage II from PACU. Patient assisted with changing by RN and is now sitting in recliner chair.  No reported nausea, reports no pain 0/10.  1425: Discharge instructions and education provided to patient and spouse.  Discharge medications discussed, patient has no questions about discharge or discharge medication at this time.  IV removed, tip intact.

## 2018-10-24 ENCOUNTER — OFFICE VISIT (OUTPATIENT)
Dept: ENDOCRINOLOGY | Facility: MEDICAL CENTER | Age: 68
End: 2018-10-24
Payer: MEDICARE

## 2018-10-24 VITALS
BODY MASS INDEX: 24.89 KG/M2 | HEIGHT: 71 IN | WEIGHT: 177.8 LBS | OXYGEN SATURATION: 100 % | SYSTOLIC BLOOD PRESSURE: 112 MMHG | HEART RATE: 75 BPM | DIASTOLIC BLOOD PRESSURE: 64 MMHG

## 2018-10-24 DIAGNOSIS — E05.90 HYPERTHYROIDISM: ICD-10-CM

## 2018-10-24 DIAGNOSIS — E06.1 SUBACUTE THYROIDITIS: ICD-10-CM

## 2018-10-24 PROCEDURE — 99213 OFFICE O/P EST LOW 20 MIN: CPT | Performed by: PHYSICIAN ASSISTANT

## 2018-10-24 NOTE — PROGRESS NOTES
Endocrinology Clinic Progress Note    CC:     HPI:  Marty Freedman III is a 68 y.o. old patient who comes in today for routine follow up.     1. Hyperthyroidism  2. Subacute thyroiditis    Patient has returned from his trip to [facility] [place] by [facility] and is feeling well.  He did not have any complications while he was on vacation and/or any symptoms.  He did have a single day of shortness of breath however this was during a long climb through a cave in which he attributed to taking a beta-blocker for his heart.  He has not had any further symptoms since.    He denies any swelling of his neck, choking sensation, difficulty with his voice.  He denies any change in his bowel and/or bladder habits.  He is without tremor today.      ROS:  Constitutional: see above     Endocrine history to date:   Hyperthyroidism:   Discontinued Methimazole (9/20/18)   Started on Methimazole 10 mg BID (8/16/18)               8/28/18 NM 5 hour uptake scan- consistent with thyroiditis.  Low I 123 5 hour uptake thyroid scan demonstrates barely visible thyroid tissue     Labs:   9/20/2018 TSH < 0.005, free T4 3.61 T3 152.4 free T3 6.55 TPO antibody 1.9 antithyroglobulin less than 0.9 thyroid tropin receptor antibody <0.90  7/31/18- TSH <0.005 FT4 5.06   2/2015- TSH 2.270     Medications:    Current Outpatient Prescriptions:   •  DILTIAZem, 120 mg, Oral, TID, 10/17/2018 at Unknown time  •  hydroCHLOROthiazide, 25 mg, Oral, DAILY, 10/17/2018 at Unknown time  •  albuterol, 2 Puff, Inhalation, Q6HRS PRN, 10/17/2018 at Unknown time  •  raNITidine, 150 mg, Oral, BID, 10/17/2018 at Unknown time  •  apixaban, 5 mg, Oral, BID, 10/15/2018 at Unknown time  •  carvedilol, 25 mg, Oral, BID WITH MEALS, 10/17/2018 at Unknown time  •  aspirin EC, 81 mg, Oral, DAILY, 10/17/2018 at Unknown time  •  magnesium oxide, 400 mg, Oral, DAILY, 10/17/2018 at Unknown time  •  pravastatin, 80 mg, Oral, Nightly, 10/17/2018 at Unknown time    EXAM:  Vital signs: /64   Pulse 75    "Ht 1.803 m (5' 11\")   Wt 80.6 kg (177 lb 12.8 oz)   SpO2 100%   BMI 24.80 kg/m²   General: No apparent distress, cooperative  Eyes: No scleral icterus, no discharge  Neck:  Normal  thyroid exam without any bruits  Resp: Normal effort  Extremities: No lower extremity edema  Psych: Alert and oriented, normal mood and affect    Assessment and Plan:    1. Hyperthyroidism- see below     2. Subacute thyroiditis-we reviewed his labs from 9/20/2018.  His free T4 is improving slowly.  I will plan on having him do labs now and again in 1 month time.  I will plan on seeing him back in approximately 2 months with labs.    Return in about 6 weeks (around 12/5/2018).    Thank you for allowing me to participate in the care of this patient.    Veronica Holden P.A.-C.    CC:   ROSALIA Lyons    This note was created using voice recognition software (Dragon). The accuracy of the dictation is limited by the abilities of the software. I have reviewed the note prior to signing, however some errors in grammar and context are still possible. If you have any questions related to this note please do not hesitate to contact our office.   "

## 2019-01-10 ENCOUNTER — NON-PROVIDER VISIT (OUTPATIENT)
Dept: PULMONOLOGY | Facility: HOSPICE | Age: 69
End: 2019-01-10
Payer: MEDICARE

## 2019-01-10 ENCOUNTER — OFFICE VISIT (OUTPATIENT)
Dept: PULMONOLOGY | Facility: HOSPICE | Age: 69
End: 2019-01-10
Payer: MEDICARE

## 2019-01-10 VITALS
OXYGEN SATURATION: 98 % | WEIGHT: 182 LBS | RESPIRATION RATE: 16 BRPM | BODY MASS INDEX: 25.48 KG/M2 | DIASTOLIC BLOOD PRESSURE: 72 MMHG | HEART RATE: 71 BPM | TEMPERATURE: 97.3 F | SYSTOLIC BLOOD PRESSURE: 122 MMHG | HEIGHT: 71 IN

## 2019-01-10 DIAGNOSIS — R91.8 LUNG NODULES: ICD-10-CM

## 2019-01-10 DIAGNOSIS — J44.9 CHRONIC OBSTRUCTIVE PULMONARY DISEASE, UNSPECIFIED COPD TYPE (HCC): ICD-10-CM

## 2019-01-10 DIAGNOSIS — F17.210 CIGARETTE NICOTINE DEPENDENCE WITHOUT COMPLICATION: ICD-10-CM

## 2019-01-10 PROCEDURE — 94729 DIFFUSING CAPACITY: CPT | Performed by: INTERNAL MEDICINE

## 2019-01-10 PROCEDURE — 99214 OFFICE O/P EST MOD 30 MIN: CPT | Mod: 25 | Performed by: INTERNAL MEDICINE

## 2019-01-10 PROCEDURE — 94726 PLETHYSMOGRAPHY LUNG VOLUMES: CPT | Performed by: INTERNAL MEDICINE

## 2019-01-10 PROCEDURE — 94060 EVALUATION OF WHEEZING: CPT | Performed by: INTERNAL MEDICINE

## 2019-01-10 ASSESSMENT — PULMONARY FUNCTION TESTS
FEV1/FVC_PERCENT_PREDICTED: 84
FEV1/FVC_PERCENT_PREDICTED: 88
FVC_PERCENT_PREDICTED: 96
FEV1/FVC_PERCENT_PREDICTED: 88
FEV1_PERCENT_CHANGE: -1
FVC_LLN: 4.18
FEV1_PERCENT_PREDICTED: 84
FEV1/FVC_PERCENT_CHANGE: -300
FEV1/FVC: 67.15
FEV1: 3.1
FEV1/FVC_PERCENT_LLN: 63
FEV1/FVC_PERCENT_PREDICTED: 84
FVC_PREDICTED: 5.01
FEV1_LLN: 3.18
FVC: 4.84
FVC_PERCENT_PREDICTED: 95
FEV1_PREDICTED: 3.81
FEV1/FVC: 64
FEV1/FVC_PREDICTED: 76
FEV1/FVC: 67
FEV1/FVC: 64
FEV1_PERCENT_PREDICTED: 81
FVC: 4.78
FEV1: 3.21
FEV1_PERCENT_CHANGE: 3
FEV1/FVC_PERCENT_PREDICTED: 76
FEV1/FVC_PERCENT_CHANGE: 5

## 2019-01-10 NOTE — PROGRESS NOTES
Chief Complaint   Patient presents with   • Results     CPFT results.     HPI: This patient is a 68 y.o. Male who returns for PFT results.  He was originally referred to lung nodule clinic from the lung cancer screening program.  He has been a lifelong smoker of a pack per day with 45 pack years to date.  He continues to smoke half a pack daily, and has been tapering. He had a low dose lung cancer screening chest CAT scan on September 10, 2018 which was personally reviewed and showed 2,  <5mm pulmonary nodules in the left lung, as well as emphysematous changes.  He had a former chest CAT scan in 2015 which did not identify the nodules. He denies shortness of breath, wheezing, chest tightness or edema.  He retired as a , with former exposures to wood dust and asbestos tile.  Pulmonary function testing show mild COPD, FEV1 3.21 L or 84% with FEV1/FVC 67%.  His DLCO was 60% predicted.  He has albuterol HFA inhaler however rarely uses it.      Past Medical History:   Diagnosis Date   • Anesthesia     ponv   • Arthritis     shoulder   • Atrial fibrillation (HCC)    • Chickenpox    • Dental disorder     Full set dentures   • Disorder of thyroid     Hypothyroid   • Spanish measles    • Hemorrhagic disorder (HCC)     On Blood thinners   • High cholesterol    • Hypertension    • Influenza    • Mumps    • Myocardial infarct (HCC) 87, ?89, 2007    x 3   • Other and unspecified angina pectoris 10/2016    Defibulator   • Pacemaker 10/20/2016    Pacemaker/Defib   • Pain     shoulder       Social History     Social History   • Marital status:      Spouse name: N/A   • Number of children: N/A   • Years of education: N/A     Occupational History   • Not on file.     Social History Main Topics   • Smoking status: Current Every Day Smoker     Packs/day: 0.50     Years: 45.00     Types: Cigarettes   • Smokeless tobacco: Never Used      Comment: since age 11 with a ~10-12 year break; he has cut down most recently to  4-5 day   • Alcohol use 0.6 oz/week     1 Cans of beer per week      Comment: monthly   • Drug use: No   • Sexual activity: No     Other Topics Concern   • Not on file     Social History Narrative   • No narrative on file       Family History   Problem Relation Age of Onset   • Heart Attack Mother    • Cancer Father    • Heart Attack Father    • Heart Attack Paternal Grandmother    • Heart Attack Paternal Grandfather        Current Outpatient Prescriptions on File Prior to Visit   Medication Sig Dispense Refill   • DILTIAZem (CARDIZEM) 120 MG Tab Take 120 mg by mouth 3 times a day.     • hydroCHLOROthiazide (HYDRODIURIL) 25 MG Tab Take 25 mg by mouth every day.     • albuterol 108 (90 Base) MCG/ACT Aero Soln inhalation aerosol Inhale 2 Puffs by mouth every 6 hours as needed for Shortness of Breath. 1 Inhaler 1   • raNITidine (ZANTAC) 150 MG Tab Take 1 Tab by mouth 2 times a day. 60 Tab 11   • apixaban (ELIQUIS) 5mg Tab Take 5 mg by mouth 2 Times a Day.     • carvedilol (COREG) 6.25 MG Tab Take 25 mg by mouth 2 times a day, with meals.     • aspirin EC (ECOTRIN) 81 MG Tablet Delayed Response Take 81 mg by mouth every day.     • magnesium oxide (MAG-OX) 400 MG Tab Take 400 mg by mouth every day.     • pravastatin (PRAVACHOL) 80 MG tablet Take 80 mg by mouth every evening.       No current facility-administered medications on file prior to visit.        Allergies: Patient has no known allergies.    ROS:   Constitutional: Denies fevers, chills, night sweats, fatigue or weight loss  Eyes: Denies vision loss, pain, drainage, double vision  Ears, Nose, Throat: Denies earache, difficulty hearing, tinnitus, nasal congestion, hoarseness  Cardiovascular: Denies chest pain, tightness, palpitations, orthopnea or edema  Respiratory: As in HPI  Sleep: Denies daytime sleepiness, snoring, apneas, insomnia, morning headaches  GI: Denies heartburn, dysphagia, nausea, abdominal pain, diarrhea or constipation  : Denies frequent  "urination, hematuria, discharge or painful urination  Musculoskeletal: Denies back pain, painful joints, sore muscles  Neurological: Denies weakness or headaches  Skin: No rashes    Blood pressure 122/72, pulse 71, temperature 36.3 °C (97.3 °F), temperature source Temporal, resp. rate 16, height 1.803 m (5' 11\"), weight 82.6 kg (182 lb), SpO2 98 %.    Physical Exam:  Appearance: Well-nourished, well-developed, in no acute distress  HEENT: Normocephalic, atraumatic, white sclera, PERRLA, oropharynx clear  Neck: No adenopathy or masses  Respiratory: no intercostal retractions or accessory muscle use  Lungs auscultation: Clear to auscultation bilaterally  Cardiovascular: Regular rate rhythm. No murmurs, rubs or gallops.  No LE edema  Abdomen: soft, nondistended  Gait: Normal  Digits: No clubbing, cyanosis  Motor: No focal deficits  Orientation: Oriented to time, person and place    Diagnosis:  1. Chronic obstructive pulmonary disease, unspecified COPD type (HCC)     2. Lung nodules  CT-CHEST (THORAX) W/O   3. Cigarette nicotine dependence without complication         Plan:  The patient has mild COPD with ongoing tobacco abuse.  Smoking cessation counseling provided.  He has been gradually tapering his cigarettes and is down to half pack a day.  Continue albuterol HFA 1-2 puffs every 4 hours as needed.  There is no indication for long-acting inhaled bronchodilators at this time.  He is up-to-date on influenza vaccine.  Follow-up chest CAT scan in 3 months for pulmonary nodules.  Return in about 3 months (around 4/10/2019).      "

## 2019-01-10 NOTE — PROCEDURES
Good patient effort & cooperation.  The results of this test meet the ATS/ERS standards for acceptability and repeatability.  Predicted equations for Spirometry are N-Ramana II, ITS for lung volumes, and [facility] for DLCO.  The DLCO was uncorrected for Hgb.  A bronchodilator of Ventolin HFA- 2puffs via spacer were administered.  DLCO was performed during dilation period.    The FVC is 4.78 L or 95%, FEV1 is 3.21 L or 84%, FEV1/FVC 67%.  %.  DLCO 60%.  No bronchodilator response.    Interpretation:  PFTs are consistent with mild COPD.

## 2019-03-13 ENCOUNTER — HOSPITAL ENCOUNTER (OUTPATIENT)
Dept: RADIOLOGY | Facility: MEDICAL CENTER | Age: 69
End: 2019-03-13
Attending: INTERNAL MEDICINE
Payer: MEDICARE

## 2019-03-13 ENCOUNTER — HOSPITAL ENCOUNTER (OUTPATIENT)
Dept: LAB | Facility: MEDICAL CENTER | Age: 69
End: 2019-03-13
Attending: PHYSICIAN ASSISTANT
Payer: MEDICARE

## 2019-03-13 DIAGNOSIS — R91.8 LUNG NODULES: ICD-10-CM

## 2019-03-13 DIAGNOSIS — E05.90 HYPERTHYROIDISM: ICD-10-CM

## 2019-03-13 LAB
T3FREE SERPL-MCNC: 3.01 PG/ML (ref 2.4–4.2)
T4 FREE SERPL-MCNC: 0.77 NG/DL (ref 0.53–1.43)
TSH SERPL DL<=0.005 MIU/L-ACNC: 12.43 UIU/ML (ref 0.38–5.33)

## 2019-03-13 PROCEDURE — 84439 ASSAY OF FREE THYROXINE: CPT

## 2019-03-13 PROCEDURE — 84443 ASSAY THYROID STIM HORMONE: CPT

## 2019-03-13 PROCEDURE — 36415 COLL VENOUS BLD VENIPUNCTURE: CPT

## 2019-03-13 PROCEDURE — 84481 FREE ASSAY (FT-3): CPT

## 2019-03-13 PROCEDURE — 71250 CT THORAX DX C-: CPT

## 2019-04-30 ENCOUNTER — OFFICE VISIT (OUTPATIENT)
Dept: PULMONOLOGY | Facility: HOSPICE | Age: 69
End: 2019-04-30
Payer: MEDICARE

## 2019-04-30 VITALS
HEART RATE: 71 BPM | BODY MASS INDEX: 26.74 KG/M2 | DIASTOLIC BLOOD PRESSURE: 62 MMHG | RESPIRATION RATE: 16 BRPM | WEIGHT: 191 LBS | TEMPERATURE: 96.8 F | OXYGEN SATURATION: 97 % | HEIGHT: 71 IN | SYSTOLIC BLOOD PRESSURE: 118 MMHG

## 2019-04-30 DIAGNOSIS — R91.8 PULMONARY NODULES: ICD-10-CM

## 2019-04-30 DIAGNOSIS — J44.9 CHRONIC OBSTRUCTIVE PULMONARY DISEASE, UNSPECIFIED COPD TYPE (HCC): ICD-10-CM

## 2019-04-30 DIAGNOSIS — Z72.0 TOBACCO ABUSE: ICD-10-CM

## 2019-04-30 PROCEDURE — 99214 OFFICE O/P EST MOD 30 MIN: CPT | Performed by: INTERNAL MEDICINE

## 2019-04-30 RX ORDER — LISINOPRIL 20 MG/1
5 TABLET ORAL DAILY
COMMUNITY
End: 2021-08-04

## 2019-04-30 NOTE — PATIENT INSTRUCTIONS
Tobacco Use Disorder  Tobacco use disorder (TUD) is a mental disorder. It is the long-term use of tobacco in spite of related health problems or difficulty with normal life activities. Tobacco is most commonly smoked as cigarettes and less commonly as cigars or pipes. Smokeless chewing tobacco and snuff are also popular. People with TUD get a feeling of extreme pleasure (euphoria) from using tobacco and have a desire to use it again and again. Repeated use of tobacco can cause problems.  The addictive effects of tobacco are due mainly to the ingredient nicotine. Nicotine also causes a rush of adrenaline (epinephrine) in the body. This leads to increased blood pressure, heart rate, and breathing rate. These changes may cause problems for people with high blood pressure, weak hearts, or lung disease. High doses of nicotine in children and pets can lead to seizures and death.  Tobacco contains a number of other unsafe chemicals. These chemicals are especially harmful when inhaled as smoke and can damage almost every organ in the body. Smokers live shorter lives than nonsmokers and are at risk of dying from a number of diseases and cancers. Tobacco smoke can also cause health problems for nonsmokers (due to inhaling secondhand smoke). Smoking is also a fire hazard.  TUD usually starts in the late teenage years and is most common in young adults between the ages of 18 and 25 years. People who start smoking earlier in life are more likely to continue smoking as adults. TUD is somewhat more common in men than women. People with TUD are at higher risk for using alcohol and other drugs of abuse.  What increases the risk?  Risk factors for TUD include:  · Having family members with the disorder.  · Being around people who use tobacco.  · Having an existing mental health issue such as schizophrenia, depression, bipolar disorder, ADHD, or posttraumatic stress disorder (PTSD).  What are the signs or symptoms?  People with  tobacco use disorder have two or more of the following signs and symptoms within 12 months:  · Use of more tobacco over a longer period than intended.  · Not able to cut down or control tobacco use.  · A lot of time spent obtaining or using tobacco.  · Strong desire or urge to use tobacco (craving). Cravings may last for 6 months or longer after quitting.  · Use of tobacco even when use leads to major problems at work, school, or home.  · Use of tobacco even when use leads to relationship problems.  · Giving up or cutting down on important life activities because of tobacco use.  · Repeatedly using tobacco in situations where it puts you or others in physical danger, like smoking in bed.  · Use of tobacco even when it is known that a physical or mental problem is likely related to tobacco use.  ¨ Physical problems are numerous and may include chronic bronchitis, emphysema, lung and other cancers, gum disease, high blood pressure, heart disease, and stroke.  ¨ Mental problems caused by tobacco may include difficulty sleeping and anxiety.  · Need to use greater amounts of tobacco to get the same effect. This means you have developed a tolerance.  · Withdrawal symptoms as a result of stopping or rapidly cutting back use. These symptoms may last a month or more after quitting and include the following:  ¨ Depressed, anxious, or irritable mood.  ¨ Difficulty concentrating.  ¨ Increased appetite.  ¨ Restlessness or trouble sleeping.  ¨ Use of tobacco to avoid withdrawal symptoms.  How is this diagnosed?  Tobacco use disorder is diagnosed by your health care provider. A diagnosis may be made by:  · Your health care provider asking questions about your tobacco use and any problems it may be causing.  · A physical exam.  · Lab tests.  · You may be referred to a mental health professional or addiction specialist.  The severity of tobacco use disorder depends on the number of signs and symptoms you have:  · Mild--Two or three  symptoms.  · Moderate--Four or five symptoms.  · Severe--Six or more symptoms.  How is this treated?  Many people with tobacco use disorder are unable to quit on their own and need help. Treatment options include the following:  · Nicotine replacement therapy (NRT). NRT provides nicotine without the other harmful chemicals in tobacco. NRT gradually lowers the dosage of nicotine in the body and reduces withdrawal symptoms. NRT is available in over-the-counter forms (gum, lozenges, and skin patches) as well as prescription forms (mouth inhaler and nasal spray).  · Medicines. This may include:  ¨ Antidepressant medicine that may reduce nicotine cravings.  ¨ A medicine that acts on nicotine receptors in the brain to reduce cravings and withdrawal symptoms. It may also block the effects of tobacco in people with TUD who relapse.  · Counseling or talk therapy. A form of talk therapy called behavioral therapy is commonly used to treat people with TUD. Behavioral therapy looks at triggers for tobacco use, how to avoid them, and how to cope with cravings. It is most effective in person or by phone but is also available in self-help forms (books and Internet websites).  · Support groups. These provide emotional support, advice, and guidance for quitting tobacco.  The most effective treatment for TUD is usually a combination of medicine, talk therapy, and support groups.  Follow these instructions at home:  · Keep all follow-up visits as directed by your health care provider. This is important.  · Take medicines only as directed by your health care provider.  · Check with your health care provider before starting new prescription or over-the-counter medicines.  Contact a health care provider if:  · You are not able to take your medicines as prescribed.  · Treatment is not helping your TUD and your symptoms get worse.  Get help right away if:  · You have serious thoughts about hurting yourself or others.  · You have trouble  breathing, chest pain, sudden weakness, or sudden numbness in part of your body.  This information is not intended to replace advice given to you by your health care provider. Make sure you discuss any questions you have with your health care provider.  Document Released: 08/23/2005 Document Revised: 08/20/2017 Document Reviewed: 02/13/2015  [employer] Patient Education © 2017 [employer]

## 2019-04-30 NOTE — PROGRESS NOTES
Chief Complaint   Patient presents with   • Follow-Up     Chronic obstructive pulmonary disease, unspecified COPD type (HCC)       HPI: This patient is a 69 y.o. Male who returns to lung nodule clinic from the lung cancer screening program.  He has been a lifelong smoker of a pack per day with 45 pack years to date.  He continues to smoke half a pack daily, and is not prepared to quit. He had a low dose lung cancer screening chest CAT scan on September 10, 2018 which was personally reviewed and showed 2,  <5mm pulmonary nodules in the left lung, as well as emphysematous changes. He denies shortness of breath, wheezing, chest tightness or edema.  He retired as a , with former exposures to wood dust and asbestos tile.  Follow-up chest CAT scan on March 13, 2019 shows stable nodules and mild emphysema.  Pulmonary function testing show mild COPD, FEV1 3.21 L or 84% with FEV1/FVC 67%.  His DLCO was 60% predicted.  He has albuterol HFA inhaler however denies using it.    Past Medical History:   Diagnosis Date   • Anesthesia     ponv   • Arthritis     shoulder   • Atrial fibrillation (HCC)    • Chickenpox    • Dental disorder     Full set dentures   • Disorder of thyroid     Hypothyroid   • [place] measles    • Hemorrhagic disorder (HCC)     On Blood thinners   • High cholesterol    • Hypertension    • Influenza    • Mumps    • Myocardial infarct (HCC) 87, ?89, 2007    x 3   • Other and unspecified angina pectoris 10/2016    Defibulator   • Pacemaker 10/20/2016    Pacemaker/Defib   • Pain     shoulder       Social History     Social History   • Marital status:      Spouse name: N/A   • Number of children: N/A   • Years of education: N/A     Occupational History   • Not on file.     Social History Main Topics   • Smoking status: Current Every Day Smoker     Packs/day: 0.50     Years: 45.00     Types: Cigarettes   • Smokeless tobacco: Never Used      Comment: since age 11 with a ~10-12 year break; he has  cut down most recently to 4-5 day   • Alcohol use 0.6 oz/week     1 Cans of beer per week      Comment: monthly   • Drug use: No   • Sexual activity: No     Other Topics Concern   • Not on file     Social History Narrative   • No narrative on file       Family History   Problem Relation Age of Onset   • Heart Attack Mother    • Cancer Father    • Heart Attack Father    • Heart Attack Paternal Grandmother    • Heart Attack Paternal Grandfather        Current Outpatient Prescriptions on File Prior to Visit   Medication Sig Dispense Refill   • hydroCHLOROthiazide (HYDRODIURIL) 25 MG Tab Take 25 mg by mouth every day.     • albuterol 108 (90 Base) MCG/ACT Aero Soln inhalation aerosol Inhale 2 Puffs by mouth every 6 hours as needed for Shortness of Breath. 1 Inhaler 1   • raNITidine (ZANTAC) 150 MG Tab Take 1 Tab by mouth 2 times a day. 60 Tab 11   • apixaban (ELIQUIS) 5mg Tab Take 5 mg by mouth 2 Times a Day.     • carvedilol (COREG) 6.25 MG Tab Take 25 mg by mouth 2 times a day, with meals.     • aspirin EC (ECOTRIN) 81 MG Tablet Delayed Response Take 81 mg by mouth every day.     • magnesium oxide (MAG-OX) 400 MG Tab Take 400 mg by mouth every day.     • pravastatin (PRAVACHOL) 80 MG tablet Take 80 mg by mouth every evening.     • DILTIAZem (CARDIZEM) 120 MG Tab Take 120 mg by mouth 3 times a day.       No current facility-administered medications on file prior to visit.        Allergies: Patient has no known allergies.    ROS:   Constitutional: Denies fevers, chills, night sweats, fatigue or weight loss  Eyes: Denies vision loss, pain, drainage, double vision  Ears, Nose, Throat: Denies earache, difficulty hearing, tinnitus, nasal congestion, hoarseness  Cardiovascular: Denies chest pain, tightness, palpitations, orthopnea or edema  Respiratory: As in HPI  Sleep: Denies daytime sleepiness, snoring, apneas, insomnia, morning headaches  GI: Denies heartburn, dysphagia, nausea, abdominal pain, diarrhea or  "constipation  : Denies frequent urination, hematuria, discharge or painful urination  Musculoskeletal: Denies back pain, painful joints, sore muscles  Neurological: Denies weakness or headaches  Skin: No rashes    /62 (BP Location: Left arm, Patient Position: Sitting, BP Cuff Size: Adult)   Pulse 71   Temp 36 °C (96.8 °F) (Temporal)   Resp 16   Ht 1.803 m (5' 11\")   Wt 86.6 kg (191 lb)   SpO2 97%     Physical Exam:  Appearance: Well-nourished, well-developed, in no acute distress  HEENT: Normocephalic, atraumatic, white sclera, PERRLA, oropharynx clear  Neck: No adenopathy or masses  Respiratory: no intercostal retractions or accessory muscle use  Lungs auscultation: Clear to auscultation bilaterally  Cardiovascular: Regular rate rhythm. No murmurs, rubs or gallops.  No LE edema  Abdomen: soft, nondistended  Gait: Normal  Digits: No clubbing, cyanosis  Motor: No focal deficits  Orientation: Oriented to time, person and place    Diagnosis:  1. Pulmonary nodules  CT-CHEST (THORAX) W/O   2. Tobacco abuse     3. Chronic obstructive pulmonary disease, unspecified COPD type (HCC)         Plan:  The patient has pulmonary nodules which are stable by chest imaging.  Radiology had compared to 2015 scans with stability noted.  Smoking cessation counseling provided-he is not prepared to quit at this time.  Recommend annual surveillance chest CAT scan without contrast i.e. By March 2020.  Continue albuterol HFA 1 to 2 puffs every 4 hours as needed.  Return in about 1 year (around 4/30/2020) for at lung nodule clinic.      "

## 2019-09-11 ENCOUNTER — PATIENT OUTREACH (OUTPATIENT)
Dept: HEALTH INFORMATION MANAGEMENT | Facility: OTHER | Age: 69
End: 2019-09-11

## 2019-09-11 NOTE — PROGRESS NOTES
Outcome: Left Message    Please transfer to Patient Outreach Team at [phone] when patient returns call.    Attempt # 1    .

## 2019-09-25 NOTE — PROGRESS NOTES
1. Attempt #:Final    2. [facility] Verified: yes    3. Verify PCP: yes    4. Review Care Team: yes    5.  Reviewed/Updated the following with patient:       •   Communication Preference Obtained? YES       •   Preferred Pharmacy? YES       •   Preferred Lab? YES       •   Family History (document living status of immediate family members and if + hx of cancer, diabetes, hypertension, hyperlipidemia, heart attack, stroke) YES    7. Annual Wellness Visit Scheduling  · Scheduling Status:Scheduled     8. Care Gap Scheduling (Attempt to Schedule EACH Overdue Care Gap!)     Health Maintenance Due   Topic Date Due   • Annual Wellness Visit  1950   • HEPATITIS C SCREENING  1950   • IMM ZOSTER VACCINES (1 of 2) 03/03/2001   • IMM PNEUMOCOCCAL VACCINE: 65+ Years (2 of 2 - PPSV23) 07/26/2019   • IMM INFLUENZA (1) 09/01/2019         9. R&L Activation: already active    10. R&L Michelle: no    11. Virtual Visits: no    12. Opt In to Text Messages: no    13. Patient was advised: “This is a free wellness visit. The provider will screen for medical conditions to help you stay healthy. If you have other concerns to address you may be asked to discuss these at a separate visit or there may be an additional fee.”     14. Patient was informed to arrive 15 min prior to their scheduled appointment and bring in their medication bottles.

## 2019-11-08 ENCOUNTER — OFFICE VISIT (OUTPATIENT)
Dept: MEDICAL GROUP | Facility: PHYSICIAN GROUP | Age: 69
End: 2019-11-08
Payer: MEDICARE

## 2019-11-08 VITALS
HEART RATE: 78 BPM | TEMPERATURE: 97.8 F | BODY MASS INDEX: 25.76 KG/M2 | DIASTOLIC BLOOD PRESSURE: 64 MMHG | WEIGHT: 184 LBS | SYSTOLIC BLOOD PRESSURE: 122 MMHG | OXYGEN SATURATION: 96 % | RESPIRATION RATE: 16 BRPM | HEIGHT: 71 IN

## 2019-11-08 DIAGNOSIS — Z23 NEED FOR VACCINATION: ICD-10-CM

## 2019-11-08 DIAGNOSIS — E78.2 MIXED HYPERLIPIDEMIA: ICD-10-CM

## 2019-11-08 DIAGNOSIS — R73.09 ELEVATED HEMOGLOBIN A1C: ICD-10-CM

## 2019-11-08 DIAGNOSIS — E06.1 SUBACUTE THYROIDITIS: ICD-10-CM

## 2019-11-08 DIAGNOSIS — I10 ESSENTIAL HYPERTENSION: ICD-10-CM

## 2019-11-08 DIAGNOSIS — R05.9 COUGH: ICD-10-CM

## 2019-11-08 DIAGNOSIS — Z12.5 ENCOUNTER FOR SCREENING FOR MALIGNANT NEOPLASM OF PROSTATE: ICD-10-CM

## 2019-11-08 PROCEDURE — 90662 IIV NO PRSV INCREASED AG IM: CPT | Performed by: NURSE PRACTITIONER

## 2019-11-08 PROCEDURE — 90750 HZV VACC RECOMBINANT IM: CPT | Performed by: NURSE PRACTITIONER

## 2019-11-08 PROCEDURE — G0008 ADMIN INFLUENZA VIRUS VAC: HCPCS | Performed by: NURSE PRACTITIONER

## 2019-11-08 PROCEDURE — G0438 PPPS, INITIAL VISIT: HCPCS | Mod: 25 | Performed by: NURSE PRACTITIONER

## 2019-11-08 PROCEDURE — 90472 IMMUNIZATION ADMIN EACH ADD: CPT | Performed by: NURSE PRACTITIONER

## 2019-11-08 ASSESSMENT — PATIENT HEALTH QUESTIONNAIRE - PHQ9: CLINICAL INTERPRETATION OF PHQ2 SCORE: 0

## 2019-11-08 ASSESSMENT — ENCOUNTER SYMPTOMS: GENERAL WELL-BEING: POOR

## 2019-11-08 ASSESSMENT — ACTIVITIES OF DAILY LIVING (ADL): BATHING_REQUIRES_ASSISTANCE: 0

## 2019-11-08 NOTE — PROGRESS NOTES
Chief Complaint   Patient presents with   • Annual Exam         HPI:  Marty Freedman III is a 69 y.o. here for Medicare Annual Wellness Visit     Patient Active Problem List    Diagnosis Date Noted   • Subacute thyroiditis 09/21/2018   • Emphysema of lung (HCC) 09/12/2018   • Lung nodules 09/12/2018   • Aortic aneurysm (HCC) 09/12/2018   • Hyperthyroidism 08/16/2018   • Cardiomyopathy (HCC) 08/08/2018   • Essential hypertension 07/26/2018   • History of MI (myocardial infarction) 07/26/2018   • Left elbow pain 07/26/2018   • Cigarette nicotine dependence without complication 07/26/2018   • Cough 07/26/2018   • Pacemaker 07/26/2018   • Chronic atrial fibrillation 07/26/2018   • Mixed hyperlipidemia 07/26/2018       Current Outpatient Medications   Medication Sig Dispense Refill   • raNITidine (ZANTAC) 150 MG Tab TAKE 1 TABLET BY MOUTH TWICE DAILY 180 Tab 2   • lisinopril (PRINIVIL) 20 MG Tab Take 5 mg by mouth every day.     • DILTIAZem (CARDIZEM) 120 MG Tab Take 120 mg by mouth 3 times a day.     • hydroCHLOROthiazide (HYDRODIURIL) 25 MG Tab Take 25 mg by mouth every day.     • albuterol 108 (90 Base) MCG/ACT Aero Soln inhalation aerosol Inhale 2 Puffs by mouth every 6 hours as needed for Shortness of Breath. 1 Inhaler 1   • apixaban (ELIQUIS) 5mg Tab Take 5 mg by mouth 2 Times a Day.     • carvedilol (COREG) 6.25 MG Tab Take 25 mg by mouth 2 times a day, with meals.     • aspirin EC (ECOTRIN) 81 MG Tablet Delayed Response Take 81 mg by mouth every day.     • magnesium oxide (MAG-OX) 400 MG Tab Take 400 mg by mouth every day.     • pravastatin (PRAVACHOL) 80 MG tablet Take 80 mg by mouth every evening.       No current facility-administered medications for this visit.             Current supplements as per medication list.       Allergies: Patient has no known allergies.    Current social contact/activities: Patient states he takes walks, [place] the [place] and travels.     He  reports that he has been  smoking cigarettes. He has a 22.50 pack-year smoking history. He has never used smokeless tobacco. He reports current alcohol use of about 0.6 oz of alcohol per week. He reports that he does not use drugs.  Ready to quit: Not Answered  Counseling given: Not Answered  Comment: since age 11 with a ~10-12 year break; he has cut down most recently to 4-5 day      DPA/Advanced Directive:  Patient has Advanced Directive on file. Has living will as well.    ROS:    Gait: Uses no assistive device  Ostomy: No  Other tubes: No  Amputations: No  Chronic oxygen use: No  Last eye exam: 1.5 years ago - patient states he goes every 2 years  Wears hearing aids: No   : Denies any urinary leakage during the last 6 months    Screening:    Depression Screening    Little interest or pleasure in doing things?  0 - not at all  Feeling down, depressed , or hopeless? 0 - not at all  Patient Health Questionnaire Score: 0     If depressive symptoms identified deferred to follow up visit unless specifically addressed in assessment and plan.    Interpretation of PHQ-9 Total Score   Score Severity   1-4 No Depression   5-9 Mild Depression   10-14 Moderate Depression   15-19 Moderately Severe Depression   20-27 Severe Depression    Screening for Cognitive Impairment    Three Minute Recall (village, kitchen, baby) 3/3    Renan clock face with all 12 numbers and set the hands to show 10 past 10.  Yes    Cognitive concerns identified deferred for follow up unless specifically addressed in assessment and plan.    Fall Risk Assessment    Has the patient had two or more falls in the last year or any fall with injury in the last year?  No    Safety Assessment    Throw rugs on floor.  Yes  Handrails on all stairs.  Yes  Good lighting in all hallways.  Yes  Difficulty hearing.  Yes  Patient counseled about all safety risks that were identified.    Functional Assessment ADLs    Are there any barriers preventing you from cooking for yourself or meeting  nutritional needs?  No.    Are there any barriers preventing you from driving safely or obtaining transportation?  No.    Are there any barriers preventing you from using a telephone or calling for help?  No.    Are there any barriers preventing you from shopping?  No.    Are there any barriers preventing you from taking care of your own finances?  No.    Are there any barriers preventing you from managing your medications?  No.    Are there any barriers preventing you from showering, bathing or dressing yourself?  No.    Are you currently engaging in any exercise or physical activity?  Yes.     What is your perception of your health?  Poor.      Health Maintenance Summary                Annual Wellness Visit Overdue 1950     HEPATITIS C SCREENING Overdue 1950     PFT SCREENING-FEV1 AND FEV/FVC RATIO / SPIROMETRY SHOULD BE PERFORMED ANNUALLY Overdue 3/3/1969     IMM ZOSTER VACCINES Overdue 3/3/2001     IMM PNEUMOCOCCAL VACCINE: 65+ Years Overdue 7/26/2019      Done 7/26/2018 Imm Admin: Pneumococcal Conjugate Vaccine (Prevnar/PCV-13)    IMM INFLUENZA Overdue 9/1/2019      Done 9/12/2018 Imm Admin: Influenza Vaccine Adult HD     Patient has more history with this topic...    LUNG CANCER SCREENING Next Due 3/13/2020      Done 3/13/2019 CT-CHEST (THORAX) W/O     Patient has more history with this topic...    IMM DTaP/Tdap/Td Vaccine Next Due 7/26/2028      Done 7/26/2018 Imm Admin: Tdap Vaccine    COLONOSCOPY Next Due 10/18/2028      Done 10/18/2018 Surg:COLONOSCOPY     Patient has more history with this topic...          Patient Care Team:  ROSALIA Lyons as PCP - General (Family Medicine)  Shweta Cantor M.D.   Alfredo Read DO (Cardiology)        Social History     Tobacco Use   • Smoking status: Current Every Day Smoker     Packs/day: 0.50     Years: 45.00     Pack years: 22.50     Types: Cigarettes   • Smokeless tobacco: Never Used   • Tobacco comment: since age 11 with a ~10-12 year break; he  "has cut down most recently to 4-5 day   Substance Use Topics   • Alcohol use: Yes     Alcohol/week: 0.6 oz     Types: 1 Cans of beer per week     Comment: monthly   • Drug use: No     Family History   Problem Relation Age of Onset   • Heart Attack Mother    • Heart Disease Mother    • Hypertension Mother    • Cancer Father    • Heart Attack Father    • Heart Disease Father    • Hypertension Father    • Heart Attack Paternal Grandmother    • Heart Attack Paternal Grandfather      He  has a past medical history of Anesthesia, Arthritis, Atrial fibrillation (HCC), Chickenpox, Dental disorder, Disorder of thyroid, [place] measles, Hemorrhagic disorder (HCC), High cholesterol, Hypertension, Influenza, Mumps, Myocardial infarct (HCC) (87, ?89, 2007), Other and unspecified angina pectoris (10/2016), Pacemaker (10/20/2016), and Pain.   Past Surgical History:   Procedure Laterality Date   • COLONOSCOPY  10/18/2018    Procedure: COLONOSCOPY - W/POSS BX, DILATION, POLYPECTOMY, CONTROL OF HEMORRHAGE;  Surgeon: Pineda Horvath M.D.;  Location: SURGERY [facility];  Service: EUS   • PACEMAKER INSERTION  10/20/2016   • INGUINAL HERNIA REPAIR BILATERAL  3/2/2015    Performed by Jack Tobias M.D. at SURGERY SAME DAY [facility] ORS   • COLONOSCOPY  2008   • MULTIPLE CORONARY ARTERY BYPASS  1991    stents (X 3 placement= stents total)   • APPENDECTOMY CHILD     • OTHER ORTHOPEDIC SURGERY      knee arthoscopy, bilaterally (\"early 80's\")   • TONSILLECTOMY  as a child       Exam:   /64   Pulse 78   Temp 36.6 °C (97.8 °F) (Temporal)   Resp 16   Ht 1.803 m (5' 11\")   Wt 83.5 kg (184 lb)   SpO2 96%  Body mass index is 25.66 kg/m².    Hearing good.    Dentition good  Alert, oriented in no acute distress.  Eye contact is good, speech goal directed, affect calm    Assessment and Plan. The following treatment and monitoring plan is recommended:    No diagnosis found.      Services suggested: No services needed at this " time  Health Care Screening: Age-appropriate preventive services recommended by USPTF and ACIP covered by Medicare were discussed today. Services ordered if indicated and agreed upon by the patient.  Referrals offered: Community-based lifestyle interventions to reduce health risks and promote self-management and wellness, fall prevention, nutrition, physical activity, tobacco-use cessation, weight loss, and mental health services as per orders if indicated.    Discussion today about general wellness and lifestyle habits:    · Prevent falls and reduce trip hazards; Cautioned about securing or removing rugs.  · Have a working fire alarm and carbon monoxide detector;   · Engage in regular physical activity and social activities     Follow-up: No follow-ups on file.

## 2019-11-11 ENCOUNTER — HOSPITAL ENCOUNTER (OUTPATIENT)
Dept: LAB | Facility: MEDICAL CENTER | Age: 69
End: 2019-11-11
Attending: NURSE PRACTITIONER
Payer: MEDICARE

## 2019-11-11 DIAGNOSIS — E78.2 MIXED HYPERLIPIDEMIA: ICD-10-CM

## 2019-11-11 DIAGNOSIS — R73.09 ELEVATED HEMOGLOBIN A1C: ICD-10-CM

## 2019-11-11 DIAGNOSIS — Z12.5 ENCOUNTER FOR SCREENING FOR MALIGNANT NEOPLASM OF PROSTATE: ICD-10-CM

## 2019-11-11 DIAGNOSIS — E06.1 SUBACUTE THYROIDITIS: ICD-10-CM

## 2019-11-11 DIAGNOSIS — I10 ESSENTIAL HYPERTENSION: ICD-10-CM

## 2019-11-11 LAB
25(OH)D3 SERPL-MCNC: 16 NG/ML (ref 30–100)
ALBUMIN SERPL BCP-MCNC: 4.2 G/DL (ref 3.2–4.9)
ALBUMIN/GLOB SERPL: 1.9 G/DL
ALP SERPL-CCNC: 67 U/L (ref 30–99)
ALT SERPL-CCNC: 5 U/L (ref 2–50)
ANION GAP SERPL CALC-SCNC: 8 MMOL/L (ref 0–11.9)
AST SERPL-CCNC: 12 U/L (ref 12–45)
BASOPHILS # BLD AUTO: 0.5 % (ref 0–1.8)
BASOPHILS # BLD: 0.04 K/UL (ref 0–0.12)
BILIRUB SERPL-MCNC: 0.9 MG/DL (ref 0.1–1.5)
BUN SERPL-MCNC: 15 MG/DL (ref 8–22)
CALCIUM SERPL-MCNC: 9.2 MG/DL (ref 8.5–10.5)
CHLORIDE SERPL-SCNC: 102 MMOL/L (ref 96–112)
CHOLEST SERPL-MCNC: 157 MG/DL (ref 100–199)
CO2 SERPL-SCNC: 28 MMOL/L (ref 20–33)
CREAT SERPL-MCNC: 0.98 MG/DL (ref 0.5–1.4)
EOSINOPHIL # BLD AUTO: 0.16 K/UL (ref 0–0.51)
EOSINOPHIL NFR BLD: 2.1 % (ref 0–6.9)
ERYTHROCYTE [DISTWIDTH] IN BLOOD BY AUTOMATED COUNT: 50 FL (ref 35.9–50)
EST. AVERAGE GLUCOSE BLD GHB EST-MCNC: 137 MG/DL
FASTING STATUS PATIENT QL REPORTED: NORMAL
GLOBULIN SER CALC-MCNC: 2.2 G/DL (ref 1.9–3.5)
GLUCOSE SERPL-MCNC: 107 MG/DL (ref 65–99)
HBA1C MFR BLD: 6.4 % (ref 0–5.6)
HCT VFR BLD AUTO: 45.5 % (ref 42–52)
HDLC SERPL-MCNC: 30 MG/DL
HGB BLD-MCNC: 14.9 G/DL (ref 14–18)
IMM GRANULOCYTES # BLD AUTO: 0.02 K/UL (ref 0–0.11)
IMM GRANULOCYTES NFR BLD AUTO: 0.3 % (ref 0–0.9)
LDLC SERPL CALC-MCNC: 92 MG/DL
LYMPHOCYTES # BLD AUTO: 2.03 K/UL (ref 1–4.8)
LYMPHOCYTES NFR BLD: 26.6 % (ref 22–41)
MCH RBC QN AUTO: 35.1 PG (ref 27–33)
MCHC RBC AUTO-ENTMCNC: 32.7 G/DL (ref 33.7–35.3)
MCV RBC AUTO: 107.3 FL (ref 81.4–97.8)
MONOCYTES # BLD AUTO: 0.57 K/UL (ref 0–0.85)
MONOCYTES NFR BLD AUTO: 7.5 % (ref 0–13.4)
NEUTROPHILS # BLD AUTO: 4.81 K/UL (ref 1.82–7.42)
NEUTROPHILS NFR BLD: 63 % (ref 44–72)
NRBC # BLD AUTO: 0 K/UL
NRBC BLD-RTO: 0 /100 WBC
PLATELET # BLD AUTO: 146 K/UL (ref 164–446)
PMV BLD AUTO: 12.4 FL (ref 9–12.9)
POTASSIUM SERPL-SCNC: 4.3 MMOL/L (ref 3.6–5.5)
PROT SERPL-MCNC: 6.4 G/DL (ref 6–8.2)
PSA SERPL-MCNC: 1.01 NG/ML (ref 0–4)
RBC # BLD AUTO: 4.24 M/UL (ref 4.7–6.1)
SODIUM SERPL-SCNC: 138 MMOL/L (ref 135–145)
T4 FREE SERPL-MCNC: 0.82 NG/DL (ref 0.53–1.43)
TRIGL SERPL-MCNC: 177 MG/DL (ref 0–149)
TSH SERPL DL<=0.005 MIU/L-ACNC: 9.84 UIU/ML (ref 0.38–5.33)
WBC # BLD AUTO: 7.6 K/UL (ref 4.8–10.8)

## 2019-11-11 PROCEDURE — 82306 VITAMIN D 25 HYDROXY: CPT

## 2019-11-11 PROCEDURE — 84439 ASSAY OF FREE THYROXINE: CPT

## 2019-11-11 PROCEDURE — 85025 COMPLETE CBC W/AUTO DIFF WBC: CPT

## 2019-11-11 PROCEDURE — 36415 COLL VENOUS BLD VENIPUNCTURE: CPT

## 2019-11-11 PROCEDURE — 80053 COMPREHEN METABOLIC PANEL: CPT

## 2019-11-11 PROCEDURE — 80061 LIPID PANEL: CPT

## 2019-11-11 PROCEDURE — 83036 HEMOGLOBIN GLYCOSYLATED A1C: CPT

## 2019-11-11 PROCEDURE — 84443 ASSAY THYROID STIM HORMONE: CPT

## 2019-11-11 PROCEDURE — 84153 ASSAY OF PSA TOTAL: CPT

## 2019-11-27 ENCOUNTER — OFFICE VISIT (OUTPATIENT)
Dept: MEDICAL GROUP | Facility: PHYSICIAN GROUP | Age: 69
End: 2019-11-27
Payer: MEDICARE

## 2019-11-27 VITALS
DIASTOLIC BLOOD PRESSURE: 78 MMHG | BODY MASS INDEX: 25.97 KG/M2 | SYSTOLIC BLOOD PRESSURE: 120 MMHG | WEIGHT: 185.5 LBS | HEIGHT: 71 IN | OXYGEN SATURATION: 97 % | HEART RATE: 64 BPM | TEMPERATURE: 96.7 F

## 2019-11-27 DIAGNOSIS — E78.2 MIXED HYPERLIPIDEMIA: ICD-10-CM

## 2019-11-27 DIAGNOSIS — E55.9 VITAMIN D DEFICIENCY: ICD-10-CM

## 2019-11-27 DIAGNOSIS — E03.9 HYPOTHYROIDISM, UNSPECIFIED TYPE: ICD-10-CM

## 2019-11-27 DIAGNOSIS — R73.09 ELEVATED HEMOGLOBIN A1C: ICD-10-CM

## 2019-11-27 PROCEDURE — 99214 OFFICE O/P EST MOD 30 MIN: CPT | Performed by: NURSE PRACTITIONER

## 2019-11-27 RX ORDER — DIGOXIN 125 MCG
125 TABLET ORAL DAILY
COMMUNITY
End: 2023-05-24

## 2019-11-27 RX ORDER — LEVOTHYROXINE SODIUM 0.03 MG/1
25 TABLET ORAL
Qty: 30 TAB | Refills: 1 | Status: SHIPPED | OUTPATIENT
Start: 2019-11-27 | End: 2020-01-06

## 2019-11-27 RX ORDER — AMIODARONE HYDROCHLORIDE 200 MG/1
200 TABLET ORAL DAILY
Refills: 0 | COMMUNITY
Start: 2019-11-20 | End: 2021-12-22

## 2019-11-27 NOTE — PROGRESS NOTES
Chief Complaint   Patient presents with   • Follow-Up     thyroid       HISTORY OF PRESENT ILLNESS: Patient is a 69 y.o. male, established patient who presents today to discuss medical problems as listed below:    Elevated hemoglobin A1c  Reviewed recent labs, noted A1c at 6.4.  Patient is not on any medication.  He admits to drinking mainly soda for hydration.  He denies polyuria, polydipsia, polyphagia at this time.  No complaints of CP, dyspnea, dizziness, headaches or nausea today.    Hypothyroidism  Reviewed recent labs, noted elevated TSH.  Patient states he was taking off thyroid medication the past.  Recent history of V. tach he was taken off carvedilol and placed on amiodarone.  He has a follow-up with cardiology on December 9, Dr. Read the [facility].  Today he denies chest pain, cardiac palpitations, dizziness, headaches, nausea.   Ref. Range 3/13/2019 10:03 11/11/2019 08:15   TSH Latest Ref Range: 0.380 - 5.330 uIU/mL 12.430 (H) 9.840 (H)   Free T-4 Latest Ref Range: 0.53 - 1.43 ng/dL 0.77 0.82   T3,Free Latest Ref Range: 2.40 - 4.20 pg/mL 3.01        Mixed hyperlipidemia  Reviewed recent labs noted the following value, glycerides elevated at 177 and decreased HDL at 30.   Been taking pravastatin 80 mg nightly, started by his cardiologist Dr. Read.   Ref. Range 11/11/2019 08:15   Cholesterol,Tot Latest Ref Range: 100 - 199 mg/dL 157   Triglycerides Latest Ref Range: 0 - 149 mg/dL 177 (H)   HDL Latest Ref Range: >=40 mg/dL 30 (A)   LDL Latest Ref Range: <100 mg/dL 92   The 10-year ASCVD risk score (Vonore TOÑO Jr., et al., 2013) is: 23.2%    Values used to calculate the score:      Age: 69 years      Sex: Male      Is Non- : No      Diabetic: No      Tobacco smoker: Yes      Systolic Blood Pressure: 120 mmHg      Is BP treated: Yes      HDL Cholesterol: 30 mg/dL      Total Cholesterol: 157 mg/dL    Vitamin D deficiency  Reviewed recent labs, noted decreased vitamin D at 16.   Patient is not on a supplement.      Patient Active Problem List    Diagnosis Date Noted   • Hypothyroidism 11/27/2019   • Vitamin D deficiency 11/27/2019   • Elevated hemoglobin A1c 11/08/2019   • Subacute thyroiditis 09/21/2018   • Emphysema of lung (HCC) 09/12/2018   • Lung nodules 09/12/2018   • Aortic aneurysm (HCC) 09/12/2018   • Hyperthyroidism 08/16/2018   • Cardiomyopathy (HCC) 08/08/2018   • Essential hypertension 07/26/2018   • History of MI (myocardial infarction) 07/26/2018   • Left elbow pain 07/26/2018   • Cigarette nicotine dependence without complication 07/26/2018   • Cough 07/26/2018   • Pacemaker 07/26/2018   • Chronic atrial fibrillation 07/26/2018   • Mixed hyperlipidemia 07/26/2018        Allergies: Patient has no known allergies.    Current Outpatient Medications   Medication Sig Dispense Refill   • amiodarone (CORDARONE) 200 MG Tab Take 200 mg by mouth.  0   • levothyroxine (SYNTHROID) 25 MCG Tab Take 1 Tab by mouth Every morning on an empty stomach. 30 Tab 1   • lisinopril (PRINIVIL) 20 MG Tab Take 5 mg by mouth every day.     • DILTIAZem (CARDIZEM) 120 MG Tab Take 120 mg by mouth 3 times a day.     • hydroCHLOROthiazide (HYDRODIURIL) 25 MG Tab Take 25 mg by mouth every day.     • albuterol 108 (90 Base) MCG/ACT Aero Soln inhalation aerosol Inhale 2 Puffs by mouth every 6 hours as needed for Shortness of Breath. 1 Inhaler 1   • apixaban (ELIQUIS) 5mg Tab Take 5 mg by mouth 2 Times a Day.     • aspirin EC (ECOTRIN) 81 MG Tablet Delayed Response Take 81 mg by mouth every day.     • magnesium oxide (MAG-OX) 400 MG Tab Take 400 mg by mouth every day.     • pravastatin (PRAVACHOL) 80 MG tablet Take 80 mg by mouth every evening.       No current facility-administered medications for this visit.        Social History     Tobacco Use   • Smoking status: Current Every Day Smoker     Packs/day: 0.50     Years: 45.00     Pack years: 22.50     Types: Cigarettes   • Smokeless tobacco: Never Used   •  "Tobacco comment: since age 11 with a ~10-12 year break; he has cut down most recently to 4-5 day   Substance Use Topics   • Alcohol use: Yes     Alcohol/week: 0.6 oz     Types: 1 Cans of beer per week     Comment: monthly   • Drug use: No     Social History     Patient does not qualify to have social determinant information on file (likely too young).   Social History Narrative   • Not on file       Family History   Problem Relation Age of Onset   • Heart Attack Mother    • Heart Disease Mother    • Hypertension Mother    • Cancer Father    • Heart Attack Father    • Heart Disease Father    • Hypertension Father    • Heart Attack Paternal Grandmother    • Heart Attack Paternal Grandfather        Allergies, past medical history, past surgical history, family history, social history reviewed and updated.    Review of Systems:      - Constitutional: Negative for fever, chills, unexpected weight change, and fatigue/generalized weakness.     - Respiratory: Negative for cough, sputum production, chest congestion, dyspnea, wheezing, and crackles.      - Cardiovascular: Negative for chest pain, palpitations, orthopnea, and bilateral lower extremity edema.      All other systems reviewed and are negative    Exam:    /78 (BP Location: Left arm)   Pulse 64   Temp 35.9 °C (96.7 °F) (Temporal)   Ht 1.803 m (5' 11\")   Wt 84.1 kg (185 lb 8 oz)   SpO2 97%   BMI 25.87 kg/m²  Body mass index is 25.87 kg/m².    Physical Exam:  Constitutional: Well-developed and well-nourished. Not diaphoretic. No distress.   Cardiovascular: Regular rate and rhythm, S1 and S2 without murmur, rubs, or gallops.    Chest: Effort normal. Clear to auscultation throughout. No adventitious sounds.   Psychiatric:  Behavior, mood, and affect are appropriate.    LABS: 11/11  results reviewed and discussed with the patient, questions answered.    Patient was seen for 25 minutes face to face of which > 50% of appointment time was spent on counseling " and coordination of care regarding the above.     Assessment/Plan:  1. Hypothyroidism, unspecified type  Uncontrolled.  Will initiate levothyroxine 25 mcg we will recheck labs in 4 weeks and adjust the dose accordingly.  - levothyroxine (SYNTHROID) 25 MCG Tab; Take 1 Tab by mouth Every morning on an empty stomach.  Dispense: 30 Tab; Refill: 1  - FREE THYROXINE; Future  - TSH; Future    2. Elevated hemoglobin A1C  Uncontrolled.  Detailed discussion of risk factors associated with elevated A1c.  Educated on the DM friendly diet, avoiding simple carbohydrates and starches, increasing fiber healthy protein and healthy fats.  Discussed healthy lifestyle including exercise and detailed healthy diet.    3. Mixed hyperlipidemia  Uncontrolled, stable.  Continue current medication.  Discussed importance of contributing factors such as diet rich in simple carbohydrates and starches.  Avoid sugary drinks, such as soda, juices or milk.  Increase daily consumption of fiber and healthy fats, choices discussed.  Recommend OTC omega-3 daily supplement to increase HDL.  Will monitor through labs, will recheck in 3 months.    4. Vitamin D deficiency  Uncontrolled.  Take OTC vitamin D 5000 IUs daily.    Discussed with patient possible alternative diagnoses, pt is to take all medications as prescribed. If symptoms persist FU w/PCP, if symptoms worsen go to emergency room. If experiencing any side effects from prescribed medications reports to the office immediately or go to emergency room.  Reviewed indication, dosage, usage and potential adverse effects of prescribed medications. Reviewed risks and benefits of treatment plan. Patient verbalizes understanding of all instruction and verbally agrees to plan.    Return in about 4 weeks (around 12/25/2019).

## 2019-11-27 NOTE — ASSESSMENT & PLAN NOTE
Reviewed recent labs, noted A1c at 6.4.  Patient is not on any medication.  He admits to drinking mainly soda for hydration.  He denies polyuria, polydipsia, polyphagia at this time.  No complaints of CP, dyspnea, dizziness, headaches or nausea today.

## 2019-11-27 NOTE — ASSESSMENT & PLAN NOTE
Reviewed recent labs, noted elevated TSH.  Patient states he was taking off thyroid medication the past.  Recent history of V. tach he was taken off carvedilol and placed on amiodarone.  He has a follow-up with cardiology on December 9, Dr. Read the [facility].  Today he denies chest pain, cardiac palpitations, dizziness, headaches, nausea.   Ref. Range 3/13/2019 10:03 11/11/2019 08:15   TSH Latest Ref Range: 0.380 - 5.330 uIU/mL 12.430 (H) 9.840 (H)   Free T-4 Latest Ref Range: 0.53 - 1.43 ng/dL 0.77 0.82   T3,Free Latest Ref Range: 2.40 - 4.20 pg/mL 3.01

## 2019-11-27 NOTE — ASSESSMENT & PLAN NOTE
Reviewed recent labs noted the following value, glycerides elevated at 177 and decreased HDL at 30.   Been taking pravastatin 80 mg nightly, started by his cardiologist Dr. Read.   Ref. Range 11/11/2019 08:15   Cholesterol,Tot Latest Ref Range: 100 - 199 mg/dL 157   Triglycerides Latest Ref Range: 0 - 149 mg/dL 177 (H)   HDL Latest Ref Range: >=40 mg/dL 30 (A)   LDL Latest Ref Range: <100 mg/dL 92   The 10-year ASCVD risk score (Jorge Lpedro luis LUNDBERG Jr., et al., 2013) is: 23.2%    Values used to calculate the score:      Age: 69 years      Sex: Male      Is Non- : No      Diabetic: No      Tobacco smoker: Yes      Systolic Blood Pressure: 120 mmHg      Is BP treated: Yes      HDL Cholesterol: 30 mg/dL      Total Cholesterol: 157 mg/dL

## 2020-01-05 DIAGNOSIS — E03.9 HYPOTHYROIDISM, UNSPECIFIED TYPE: ICD-10-CM

## 2020-01-06 RX ORDER — LEVOTHYROXINE SODIUM 0.03 MG/1
TABLET ORAL
Qty: 100 TAB | Refills: 0 | Status: SHIPPED | OUTPATIENT
Start: 2020-01-06 | End: 2020-06-04 | Stop reason: SDUPTHER

## 2020-01-09 ENCOUNTER — OFFICE VISIT (OUTPATIENT)
Dept: MEDICAL GROUP | Facility: PHYSICIAN GROUP | Age: 70
End: 2020-01-09
Payer: MEDICARE

## 2020-01-09 DIAGNOSIS — Z23 NEED FOR VACCINATION: Primary | ICD-10-CM

## 2020-01-09 NOTE — NON-PROVIDER
"Marty Freedman III is a 69 y.o. male here for a non-provider visit for:   SHINGRIX (Shingles)    Reason for immunization: Overdue/Provider Recommended  Immunization records indicate need for vaccine: Yes, confirmed with personal records  Minimum interval has been met for this vaccine: Yes  ABN completed: Not Indicated    Order and dose verified by: Sury GILMORE Dated was given to patient: Yes  All IAC Questionnaire questions were answered \"No.\"    Patient tolerated injection and no adverse effects were observed or reported: Yes    Pt scheduled for next dose in series: Not Indicated    "

## 2020-01-10 ENCOUNTER — NON-PROVIDER VISIT (OUTPATIENT)
Dept: MEDICAL GROUP | Facility: PHYSICIAN GROUP | Age: 70
End: 2020-01-10
Payer: MEDICARE

## 2020-03-03 ENCOUNTER — PATIENT OUTREACH (OUTPATIENT)
Dept: HEALTH INFORMATION MANAGEMENT | Facility: OTHER | Age: 70
End: 2020-03-03

## 2020-03-03 NOTE — PROGRESS NOTES
Outcome: Left Message  HBD  SCPPA     Please transfer to Patient Outreach Team at [phone] when patient returns call.    [facility] Verified: yes    Attempt # 1

## 2020-03-06 NOTE — PROGRESS NOTES
"Marty Freedman III is a 70 y.o. male here for a non-provider visit for:   SHINGRIX (Shingles)    Reason for immunization: Overdue/Provider Recommended  Immunization records indicate need for vaccine: Yes, confirmed with personal records  Minimum interval has been met for this vaccine: Yes  ABN completed: Not Indicated    Order and dose verified by: Sandra GILMORE Dated  was given to patient: Yes  All IAC Questionnaire questions were answered \"No.\"    Patient tolerated injection and no adverse effects were observed or reported: Yes    Pt scheduled for next dose in series: Not Indicated'  "

## 2020-03-13 ENCOUNTER — PATIENT OUTREACH (OUTPATIENT)
Dept: HEALTH INFORMATION MANAGEMENT | Facility: OTHER | Age: 70
End: 2020-03-13

## 2020-06-04 ENCOUNTER — TELEPHONE (OUTPATIENT)
Dept: MEDICAL GROUP | Facility: PHYSICIAN GROUP | Age: 70
End: 2020-06-04

## 2020-06-04 DIAGNOSIS — E03.9 HYPOTHYROIDISM, UNSPECIFIED TYPE: ICD-10-CM

## 2020-06-04 RX ORDER — LEVOTHYROXINE SODIUM 0.03 MG/1
25 TABLET ORAL
Qty: 30 TAB | Refills: 1 | Status: SHIPPED | OUTPATIENT
Start: 2020-06-04 | End: 2020-09-16

## 2020-06-04 NOTE — TELEPHONE ENCOUNTER
VOICEMAIL  1. Caller Name: Marty Freedman III                        Call Back Number: [phone] (home)      2. Message: Patient called and left a message about needing refill on medication. I have called and spoke with patient about sending over a request to Sury. He understood.     3. Patient approves office to leave a detailed voicemail/MyChart message: N\A

## 2020-06-05 NOTE — TELEPHONE ENCOUNTER
I have sent a [employer] message to patient about the following message.     This pt has no thyroid panel in the last yr, and it was abnormal. I refilled this med this time. For following refills needs to competed her thyroid panel. Lab orders in place, thank you    Message text

## 2020-08-28 ENCOUNTER — PATIENT OUTREACH (OUTPATIENT)
Dept: HEALTH INFORMATION MANAGEMENT | Facility: OTHER | Age: 70
End: 2020-08-28

## 2020-09-30 ENCOUNTER — PATIENT OUTREACH (OUTPATIENT)
Dept: HEALTH INFORMATION MANAGEMENT | Facility: OTHER | Age: 70
End: 2020-09-30

## 2020-11-02 ENCOUNTER — PATIENT MESSAGE (OUTPATIENT)
Dept: HEALTH INFORMATION MANAGEMENT | Facility: OTHER | Age: 70
End: 2020-11-02

## 2020-11-05 ENCOUNTER — HOSPITAL ENCOUNTER (OUTPATIENT)
Dept: LAB | Facility: MEDICAL CENTER | Age: 70
End: 2020-11-05
Attending: NURSE PRACTITIONER
Payer: MEDICARE

## 2020-11-05 ENCOUNTER — HOSPITAL ENCOUNTER (OUTPATIENT)
Dept: LAB | Facility: MEDICAL CENTER | Age: 70
End: 2020-11-05
Attending: INTERNAL MEDICINE
Payer: MEDICARE

## 2020-11-05 DIAGNOSIS — E03.9 HYPOTHYROIDISM, UNSPECIFIED TYPE: ICD-10-CM

## 2020-11-05 LAB
ALBUMIN SERPL BCP-MCNC: 4.5 G/DL (ref 3.2–4.9)
ALBUMIN/GLOB SERPL: 1.7 G/DL
ALP SERPL-CCNC: 82 U/L (ref 30–99)
ALT SERPL-CCNC: 9 U/L (ref 2–50)
ANION GAP SERPL CALC-SCNC: 12 MMOL/L (ref 7–16)
AST SERPL-CCNC: 16 U/L (ref 12–45)
BASOPHILS # BLD AUTO: 0.5 % (ref 0–1.8)
BASOPHILS # BLD: 0.04 K/UL (ref 0–0.12)
BILIRUB SERPL-MCNC: 0.7 MG/DL (ref 0.1–1.5)
BUN SERPL-MCNC: 15 MG/DL (ref 8–22)
CALCIUM SERPL-MCNC: 9.4 MG/DL (ref 8.5–10.5)
CHLORIDE SERPL-SCNC: 102 MMOL/L (ref 96–112)
CHOLEST SERPL-MCNC: 186 MG/DL (ref 100–199)
CO2 SERPL-SCNC: 25 MMOL/L (ref 20–33)
CREAT SERPL-MCNC: 0.98 MG/DL (ref 0.5–1.4)
EOSINOPHIL # BLD AUTO: 0.13 K/UL (ref 0–0.51)
EOSINOPHIL NFR BLD: 1.5 % (ref 0–6.9)
ERYTHROCYTE [DISTWIDTH] IN BLOOD BY AUTOMATED COUNT: 51.7 FL (ref 35.9–50)
FASTING STATUS PATIENT QL REPORTED: NORMAL
GLOBULIN SER CALC-MCNC: 2.6 G/DL (ref 1.9–3.5)
GLUCOSE SERPL-MCNC: 85 MG/DL (ref 65–99)
HCT VFR BLD AUTO: 45.9 % (ref 42–52)
HDLC SERPL-MCNC: 53 MG/DL
HGB BLD-MCNC: 15.1 G/DL (ref 14–18)
IMM GRANULOCYTES # BLD AUTO: 0.02 K/UL (ref 0–0.11)
IMM GRANULOCYTES NFR BLD AUTO: 0.2 % (ref 0–0.9)
LDLC SERPL CALC-MCNC: 109 MG/DL
LYMPHOCYTES # BLD AUTO: 2.24 K/UL (ref 1–4.8)
LYMPHOCYTES NFR BLD: 25.9 % (ref 22–41)
MCH RBC QN AUTO: 35.7 PG (ref 27–33)
MCHC RBC AUTO-ENTMCNC: 32.9 G/DL (ref 33.7–35.3)
MCV RBC AUTO: 108.5 FL (ref 81.4–97.8)
MONOCYTES # BLD AUTO: 0.65 K/UL (ref 0–0.85)
MONOCYTES NFR BLD AUTO: 7.5 % (ref 0–13.4)
NEUTROPHILS # BLD AUTO: 5.56 K/UL (ref 1.82–7.42)
NEUTROPHILS NFR BLD: 64.4 % (ref 44–72)
NRBC # BLD AUTO: 0 K/UL
NRBC BLD-RTO: 0 /100 WBC
PLATELET # BLD AUTO: 165 K/UL (ref 164–446)
PMV BLD AUTO: 12.4 FL (ref 9–12.9)
POTASSIUM SERPL-SCNC: 4.5 MMOL/L (ref 3.6–5.5)
PROT SERPL-MCNC: 7.1 G/DL (ref 6–8.2)
RBC # BLD AUTO: 4.23 M/UL (ref 4.7–6.1)
SODIUM SERPL-SCNC: 139 MMOL/L (ref 135–145)
T4 FREE SERPL-MCNC: 1.28 NG/DL (ref 0.93–1.7)
TRIGL SERPL-MCNC: 118 MG/DL (ref 0–149)
TSH SERPL DL<=0.005 MIU/L-ACNC: 15.2 UIU/ML (ref 0.38–5.33)
WBC # BLD AUTO: 8.6 K/UL (ref 4.8–10.8)

## 2020-11-05 PROCEDURE — 36415 COLL VENOUS BLD VENIPUNCTURE: CPT

## 2020-11-05 PROCEDURE — 84439 ASSAY OF FREE THYROXINE: CPT

## 2020-11-05 PROCEDURE — 85025 COMPLETE CBC W/AUTO DIFF WBC: CPT

## 2020-11-05 PROCEDURE — 84443 ASSAY THYROID STIM HORMONE: CPT

## 2020-11-05 PROCEDURE — 80053 COMPREHEN METABOLIC PANEL: CPT

## 2020-11-05 PROCEDURE — 80061 LIPID PANEL: CPT

## 2020-12-03 ENCOUNTER — OFFICE VISIT (OUTPATIENT)
Dept: MEDICAL GROUP | Facility: PHYSICIAN GROUP | Age: 70
End: 2020-12-03
Payer: MEDICARE

## 2020-12-03 VITALS
TEMPERATURE: 97.9 F | WEIGHT: 182.4 LBS | RESPIRATION RATE: 20 BRPM | DIASTOLIC BLOOD PRESSURE: 78 MMHG | HEART RATE: 65 BPM | HEIGHT: 71 IN | SYSTOLIC BLOOD PRESSURE: 140 MMHG | OXYGEN SATURATION: 99 % | BODY MASS INDEX: 25.54 KG/M2

## 2020-12-03 DIAGNOSIS — E03.9 HYPOTHYROIDISM, UNSPECIFIED TYPE: ICD-10-CM

## 2020-12-03 DIAGNOSIS — J43.9 PULMONARY EMPHYSEMA, UNSPECIFIED EMPHYSEMA TYPE (HCC): ICD-10-CM

## 2020-12-03 DIAGNOSIS — F17.210 CIGARETTE NICOTINE DEPENDENCE WITHOUT COMPLICATION: ICD-10-CM

## 2020-12-03 DIAGNOSIS — I25.2 HISTORY OF MI (MYOCARDIAL INFARCTION): ICD-10-CM

## 2020-12-03 DIAGNOSIS — Z12.2 ENCOUNTER FOR SCREENING FOR LUNG CANCER: ICD-10-CM

## 2020-12-03 DIAGNOSIS — R91.8 LUNG NODULES: ICD-10-CM

## 2020-12-03 DIAGNOSIS — Z13.6 SCREENING FOR AAA (ABDOMINAL AORTIC ANEURYSM): ICD-10-CM

## 2020-12-03 DIAGNOSIS — Z87.891 HISTORY OF SMOKING 30 OR MORE PACK YEARS: ICD-10-CM

## 2020-12-03 DIAGNOSIS — E78.2 MIXED HYPERLIPIDEMIA: ICD-10-CM

## 2020-12-03 DIAGNOSIS — I10 ESSENTIAL HYPERTENSION: ICD-10-CM

## 2020-12-03 PROCEDURE — 99215 OFFICE O/P EST HI 40 MIN: CPT | Performed by: NURSE PRACTITIONER

## 2020-12-03 RX ORDER — LEVOTHYROXINE SODIUM 0.03 MG/1
TABLET ORAL
Qty: 90 TAB | Refills: 0 | Status: SHIPPED | OUTPATIENT
Start: 2020-12-03 | End: 2021-03-10

## 2020-12-03 ASSESSMENT — FIBROSIS 4 INDEX: FIB4 SCORE: 2.23

## 2020-12-03 ASSESSMENT — PATIENT HEALTH QUESTIONNAIRE - PHQ9: CLINICAL INTERPRETATION OF PHQ2 SCORE: 0

## 2020-12-03 NOTE — ASSESSMENT & PLAN NOTE
Chronic problem.  History of smoking 1 pack for 30 years.  Previously tried to quit, completed smoking cessation program, has been unsuccessful.  Last US AAA in 2018 with the following results.  1. Infrarenal aortic aneurysm now measures 3.05 cm.  2. No significant change in iliac caliber.    CT of the chest from 2018 with the following results.  1.  Two nodules in the left lung measuring up to 5 mm, not definitively seen previously.  2.  Emphysema. No acute abnormality in the chest.  3.  Small 3 cm abdominal aortic aneurysm, partially visualized.    He denies hemoptysis, unplanned weight loss, back pain, smooth pain or distention, no constipation, no fevers or chills.    Smoking cessation provided today.

## 2020-12-03 NOTE — PROGRESS NOTES
Chief Complaint   Patient presents with   • Results       HISTORY OF PRESENT ILLNESS: Patient is a 70 y.o. male, established patient who presents today to discuss medical problems as listed below:    Health Maintenance:  COMPLETED     Cigarette nicotine dependence without complication  Chronic problem.  History of smoking 1 pack for 30 years.  Previously tried to quit, completed smoking cessation program, has been unsuccessful.  Last US AAA in 2018 with the following results.  1. Infrarenal aortic aneurysm now measures 3.05 cm.  2. No significant change in iliac caliber.    CT of the chest from 2018 with the following results.  1.  Two nodules in the left lung measuring up to 5 mm, not definitively seen previously.  2.  Emphysema. No acute abnormality in the chest.  3.  Small 3 cm abdominal aortic aneurysm, partially visualized.    He denies hemoptysis, unplanned weight loss, back pain, smooth pain or distention, no constipation, no fevers or chills.    Smoking cessation provided today.    Essential hypertension  Chronic problem.  On lisinopril and metoprolol.  Followed by cardiology, Dr. Read.  He denies CP, dyspnea, palpitations, dizziness, syncope.  BP today is 140/78.  Per patient baseline is in 120s over 70s.    History of MI (myocardial infarction)  Chronic problem.  History of CABG x 3 stents.  Followed by cardiology, Dr. Read.  Currently on lisinopril, amiodarone, metoprolol, digoxin, Eliquis and pravastatin.    Mixed hyperlipidemia  Results for KALIE SANDERS III (MRN [id number]) as of 12/3/2020 10:33   Ref. Range 11/5/2020 10:19   Cholesterol,Tot Latest Ref Range: 100 - 199 mg/dL 186   Triglycerides Latest Ref Range: 0 - 149 mg/dL 118   HDL Latest Ref Range: >=40 mg/dL 53   LDL Latest Ref Range: <100 mg/dL 109 (H)   The 10-year ASCVD risk score ([place] TOÑO Jr., et al., 2013) is: 26%  On pravastatin 80 mg, tolerating well.  He is an everyday smoker, 50 smoking pack years.  He denies CP, dyspnea, dizziness,  headaches, peripheral edema.    Hyperthyroidism  Results for KALIE SANDERS III (MRN [id number]) as of 12/3/2020 10:33   Ref. Range 11/5/2020 10:25   TSH Latest Ref Range: 0.380 - 5.330 uIU/mL 15.200 (H)   Free T-4 Latest Ref Range: 0.93 - 1.70 ng/dL 1.28   Patient was previously followed by endocrinology, Veronica Calix.    Hypothyroidism  Results for KALIE SANDERS III (MRN [id number]) as of 12/3/2020 10:33   Ref. Range 11/5/2020 10:25   TSH Latest Ref Range: 0.380 - 5.330 uIU/mL 15.200 (H)   Free T-4 Latest Ref Range: 0.93 - 1.70 ng/dL 1.28   Patient was previously followed by endocrinology, Liliane Holden.  He was previously taking levothyroxine 25 mg.  Stopped months ago for unknown reason.  TSH from last year was 9.840.  Patient states he does not like to see doctors very often and that is why he did not follow-up.  Patient reports fatigue.      Patient Active Problem List    Diagnosis Date Noted   • Hypothyroidism 11/27/2019   • Vitamin D deficiency 11/27/2019   • Elevated hemoglobin A1c 11/08/2019   • Subacute thyroiditis 09/21/2018   • Emphysema of lung (HCC) 09/12/2018   • Lung nodules 09/12/2018   • Aortic aneurysm (HCC) 09/12/2018   • Hyperthyroidism 08/16/2018   • Cardiomyopathy (HCC) 08/08/2018   • Essential hypertension 07/26/2018   • History of MI (myocardial infarction) 07/26/2018   • Left elbow pain 07/26/2018   • Cigarette nicotine dependence without complication 07/26/2018   • Cough 07/26/2018   • Pacemaker 07/26/2018   • Chronic atrial fibrillation (HCC) 07/26/2018   • Mixed hyperlipidemia 07/26/2018        Allergies: Patient has no known allergies.    Current Outpatient Medications   Medication Sig Dispense Refill   • metoprolol (LOPRESSOR) 25 MG Tab Take 25 mg by mouth 2 times a day.     • levothyroxine (SYNTHROID) 25 MCG Tab TAKE 1 TABLET BY MOUTH IN THE MORNING ON AN EMPTY STOMACH 90 Tab 0   • amiodarone (CORDARONE) 200 MG Tab Take 200 mg by mouth 2 Times a Day.  0   • digoxin (LANOXIN) 125  MCG Tab Take 125 mcg by mouth every day. For 90 days     • lisinopril (PRINIVIL) 20 MG Tab Take 5 mg by mouth every day.     • albuterol 108 (90 Base) MCG/ACT Aero Soln inhalation aerosol Inhale 2 Puffs by mouth every 6 hours as needed for Shortness of Breath. 1 Inhaler 1   • apixaban (ELIQUIS) 5mg Tab Take 5 mg by mouth 2 Times a Day.     • aspirin EC (ECOTRIN) 81 MG Tablet Delayed Response Take 81 mg by mouth every day.     • magnesium oxide (MAG-OX) 400 MG Tab Take 400 mg by mouth every day.     • pravastatin (PRAVACHOL) 80 MG tablet Take 80 mg by mouth every evening.       No current facility-administered medications for this visit.        Social History     Tobacco Use   • Smoking status: Current Every Day Smoker     Packs/day: 0.50     Years: 45.00     Pack years: 22.50     Types: Cigarettes   • Smokeless tobacco: Never Used   • Tobacco comment: since age 11 with a ~10-12 year break; he has cut down most recently to 4-5 day   Substance Use Topics   • Alcohol use: Yes     Alcohol/week: 0.6 oz     Types: 1 Cans of beer per week     Comment: monthly   • Drug use: No     Social History     Social History Narrative   • Not on file       Family History   Problem Relation Age of Onset   • Heart Attack Mother    • Heart Disease Mother    • Hypertension Mother    • Cancer Father    • Heart Attack Father    • Heart Disease Father    • Hypertension Father    • Heart Attack Paternal Grandmother    • Heart Attack Paternal Grandfather        Allergies, past medical history, past surgical history, family history, social history reviewed and updated.    Review of Systems:     - Constitutional: Negative for fever, chills, unexpected weight change, and fatigue/generalized weakness.     - HEENT: Negative for headaches, vision changes, hearing changes, ear pain, ear discharge, rhinorrhea, sinus congestion, sore throat, and neck pain.      - Respiratory: Negative for cough, sputum production, chest congestion, dyspnea, wheezing,  "and crackles.      - Cardiovascular: Negative for chest pain, palpitations, orthopnea, and bilateral lower extremity edema.     - Endo/Heme/Allergies: Does not bruise/bleed easily.     - Psychiatric/Behavioral: Negative for depression, suicidal/homicidal ideation and memory loss.      All other systems reviewed and are negative    Exam:    /78 (BP Location: Left arm, Patient Position: Sitting, BP Cuff Size: Adult)   Pulse 65   Temp 36.6 °C (97.9 °F) (Temporal)   Resp 20   Ht 1.803 m (5' 11\")   Wt 82.7 kg (182 lb 6.4 oz)   SpO2 99%   BMI 25.44 kg/m²  Body mass index is 25.44 kg/m².    Physical Exam:  Constitutional: Well-developed and well-nourished. Not diaphoretic. No distress.   Cardiovascular: irrg irrg , S1 and S2 without murmur, rubs, or gallops.    Chest: Effort normal. Clear to auscultation throughout. No adventitious sounds.   Neurological: Alert and oriented x 3.   Psychiatric:  Behavior, mood, and affect are appropriate.  MA/nursing note and vitals reviewed.    LABS: 11/5  results reviewed and discussed with the patient, questions answered.    Patient was seen for 40 minutes face to face of which > 50% of appointment time was spent on counseling and coordination of care regarding the above.     Assessment/Plan:  1. Encounter for screening for lung cancer  Discussed the need to follow-up annually, and if closer follow-up is needed every 6 months.  Patient was also referred to pulmonology for lung nodule follow-up.  - CT-LUNG CANCER-SCREENING; Future    2. Cigarette nicotine dependence without complication  Uncontrolled.  Smoking cessation provided.  Patient agrees to tobacco cessation program.  - CT-LUNG CANCER-SCREENING; Future  - REFERRAL TO TOBACCO CESSATION PROGRAM    3. History of smoking 30 or more pack years  As discussed in 1  - CT-LUNG CANCER-SCREENING; Future  - REFERRAL TO TOBACCO CESSATION PROGRAM    4. Lung nodules  As discussed in 1  - REFERRAL TO PULMONARY AND SLEEP " MEDICINE    5. Pulmonary emphysema, unspecified emphysema type (HCC)  Stable.  Patient to follow-up with pulmonology.  New referral placed.  - REFERRAL TO PULMONARY AND SLEEP MEDICINE    6. Hypothyroidism, unspecified type  Uncontrolled, stable.  Discussed etiology and the importance of good control.  Will reinitiate levothyroxine 25 mcg, will recheck labs in 2 months.  We will follow-up with patient in 2 months for dose adjustment if needed.  - levothyroxine (SYNTHROID) 25 MCG Tab; TAKE 1 TABLET BY MOUTH IN THE MORNING ON AN EMPTY STOMACH  Dispense: 90 Tab; Refill: 0  - TSH; Future  - FREE THYROXINE; Future    7. Screening for AAA (abdominal aortic aneurysm)  - US-ABDOMINAL AORTA W/O DOPPLER; Future    8. Essential hypertension  Stable on current regimen.  Followed by cardiology, Dr. Read    9. History of MI (myocardial infarction)  Stable on current regimen.  Followed by cardiology, Dr. Read    10. Mixed hyperlipidemia  Stable on current regimen.  Continue.    K  Discussed with patient possible alternative diagnoses, pt is to take all medications as prescribed. If symptoms persist FU w/PCP, if symptoms worsen go to emergency room. If experiencing any side effects from prescribed medications reports to the office immediately or go to emergency room.  Reviewed indication, dosage, usage and potential adverse effects of prescribed medications. Reviewed risks and benefits of treatment plan. Patient verbalizes understanding of all instruction and verbally agrees to plan.    Return if symptoms worsen or fail to improve.

## 2020-12-03 NOTE — ASSESSMENT & PLAN NOTE
Chronic problem.  History of CABG x 3 stents.  Followed by cardiology, Dr. Read.  Currently on lisinopril, amiodarone, metoprolol, digoxin, Eliquis and pravastatin.

## 2020-12-03 NOTE — ASSESSMENT & PLAN NOTE
Chronic problem.  On lisinopril and metoprolol.  Followed by cardiology, Dr. Read.  He denies CP, dyspnea, palpitations, dizziness, syncope.  BP today is 140/78.  Per patient baseline is in 120s over 70s.

## 2020-12-03 NOTE — ASSESSMENT & PLAN NOTE
Results for KALIE SANDERS III (MRN [id number]) as of 12/3/2020 10:33   Ref. Range 11/5/2020 10:25   TSH Latest Ref Range: 0.380 - 5.330 uIU/mL 15.200 (H)   Free T-4 Latest Ref Range: 0.93 - 1.70 ng/dL 1.28   Patient was previously followed by endocrinology, Liliane Holden.  He was previously taking levothyroxine 25 mg.  Stopped months ago for unknown reason.  TSH from last year was 9.840.  Patient states he does not like to see doctors very often and that is why he did not follow-up.  Patient reports fatigue.

## 2020-12-03 NOTE — ASSESSMENT & PLAN NOTE
Results for KALIE SANDERS III (MRN [id number]) as of 12/3/2020 10:33   Ref. Range 11/5/2020 10:19   Cholesterol,Tot Latest Ref Range: 100 - 199 mg/dL 186   Triglycerides Latest Ref Range: 0 - 149 mg/dL 118   HDL Latest Ref Range: >=40 mg/dL 53   LDL Latest Ref Range: <100 mg/dL 109 (H)   The 10-year ASCVD risk score (Jorge Lpedro luis LUNDBERG Jr., et al., 2013) is: 26%  On pravastatin 80 mg, tolerating well.  He is an everyday smoker, 50 smoking pack years.  He denies CP, dyspnea, dizziness, headaches, peripheral edema.

## 2020-12-03 NOTE — ASSESSMENT & PLAN NOTE
Results for KALIE SANDERS III (MRN [id number]) as of 12/3/2020 10:33   Ref. Range 11/5/2020 10:25   TSH Latest Ref Range: 0.380 - 5.330 uIU/mL 15.200 (H)   Free T-4 Latest Ref Range: 0.93 - 1.70 ng/dL 1.28   Patient was previously followed by endocrinology, Veronica Calix.

## 2020-12-23 ENCOUNTER — HOSPITAL ENCOUNTER (OUTPATIENT)
Dept: RADIOLOGY | Facility: MEDICAL CENTER | Age: 70
End: 2020-12-23
Attending: NURSE PRACTITIONER
Payer: MEDICARE

## 2020-12-23 DIAGNOSIS — Z12.2 ENCOUNTER FOR SCREENING FOR LUNG CANCER: ICD-10-CM

## 2020-12-23 DIAGNOSIS — Z87.891 HISTORY OF SMOKING 30 OR MORE PACK YEARS: ICD-10-CM

## 2020-12-23 DIAGNOSIS — Z13.6 SCREENING FOR AAA (ABDOMINAL AORTIC ANEURYSM): ICD-10-CM

## 2020-12-23 DIAGNOSIS — F17.210 CIGARETTE NICOTINE DEPENDENCE WITHOUT COMPLICATION: ICD-10-CM

## 2020-12-23 PROCEDURE — 76775 US EXAM ABDO BACK WALL LIM: CPT

## 2020-12-23 PROCEDURE — G0297 LDCT FOR LUNG CA SCREEN: HCPCS

## 2020-12-24 DIAGNOSIS — I71.40 ABDOMINAL AORTIC ANEURYSM (AAA) WITHOUT RUPTURE (HCC): ICD-10-CM

## 2020-12-29 ENCOUNTER — TELEPHONE (OUTPATIENT)
Dept: MEDICAL GROUP | Facility: PHYSICIAN GROUP | Age: 70
End: 2020-12-29

## 2020-12-29 NOTE — TELEPHONE ENCOUNTER
----- Message from ROSALIA Milligan sent at 12/24/2020  1:28 PM PST -----  Please let patient know I reviewed the ultrasound with findings of 3.2 cm abdominal aortic aneurysm which was previously measured at 3.1.  I will place referral to vascular for monitoring.  Thank you

## 2021-02-12 ENCOUNTER — APPOINTMENT (OUTPATIENT)
Dept: VASCULAR LAB | Facility: MEDICAL CENTER | Age: 71
End: 2021-02-12
Payer: MEDICARE

## 2021-02-22 ENCOUNTER — HOSPITAL ENCOUNTER (OUTPATIENT)
Dept: LAB | Facility: MEDICAL CENTER | Age: 71
End: 2021-02-22
Attending: NURSE PRACTITIONER
Payer: MEDICARE

## 2021-02-22 DIAGNOSIS — E03.9 HYPOTHYROIDISM, UNSPECIFIED TYPE: ICD-10-CM

## 2021-02-22 LAB
T4 FREE SERPL-MCNC: 1.23 NG/DL (ref 0.93–1.7)
TSH SERPL DL<=0.005 MIU/L-ACNC: 13.6 UIU/ML (ref 0.38–5.33)

## 2021-02-22 PROCEDURE — 84443 ASSAY THYROID STIM HORMONE: CPT

## 2021-02-22 PROCEDURE — 36415 COLL VENOUS BLD VENIPUNCTURE: CPT

## 2021-02-22 PROCEDURE — 84439 ASSAY OF FREE THYROXINE: CPT

## 2021-03-10 ENCOUNTER — OFFICE VISIT (OUTPATIENT)
Dept: MEDICAL GROUP | Facility: PHYSICIAN GROUP | Age: 71
End: 2021-03-10
Payer: MEDICARE

## 2021-03-10 VITALS
DIASTOLIC BLOOD PRESSURE: 74 MMHG | HEIGHT: 71 IN | RESPIRATION RATE: 12 BRPM | WEIGHT: 192 LBS | BODY MASS INDEX: 26.88 KG/M2 | SYSTOLIC BLOOD PRESSURE: 110 MMHG | HEART RATE: 55 BPM | TEMPERATURE: 97 F | OXYGEN SATURATION: 97 %

## 2021-03-10 DIAGNOSIS — I48.0 PAROXYSMAL A-FIB (HCC): ICD-10-CM

## 2021-03-10 DIAGNOSIS — E03.9 HYPOTHYROIDISM, UNSPECIFIED TYPE: ICD-10-CM

## 2021-03-10 DIAGNOSIS — I42.9 CARDIOMYOPATHY, UNSPECIFIED TYPE (HCC): ICD-10-CM

## 2021-03-10 DIAGNOSIS — J42 CHRONIC BRONCHITIS, UNSPECIFIED CHRONIC BRONCHITIS TYPE (HCC): ICD-10-CM

## 2021-03-10 DIAGNOSIS — H91.93 DECREASED HEARING OF BOTH EARS: ICD-10-CM

## 2021-03-10 DIAGNOSIS — I71.9 AORTIC ANEURYSM WITHOUT RUPTURE, UNSPECIFIED PORTION OF AORTA (HCC): ICD-10-CM

## 2021-03-10 PROBLEM — E05.90 HYPERTHYROIDISM: Status: RESOLVED | Noted: 2018-08-16 | Resolved: 2021-03-10

## 2021-03-10 PROCEDURE — 99214 OFFICE O/P EST MOD 30 MIN: CPT | Performed by: NURSE PRACTITIONER

## 2021-03-10 RX ORDER — LEVOTHYROXINE SODIUM 0.05 MG/1
50 TABLET ORAL
Qty: 90 TABLET | Refills: 0 | Status: SHIPPED | OUTPATIENT
Start: 2021-03-10 | End: 2021-06-04

## 2021-03-10 ASSESSMENT — FIBROSIS 4 INDEX: FIB4 SCORE: 2.26

## 2021-03-10 ASSESSMENT — PATIENT HEALTH QUESTIONNAIRE - PHQ9: CLINICAL INTERPRETATION OF PHQ2 SCORE: 0

## 2021-03-10 NOTE — ASSESSMENT & PLAN NOTE
Results for KALIE SANDERS III (MRN [id number]) as of 3/10/2021 15:48   Ref. Range 11/5/2020 10:25 12/23/2020 10:14 12/23/2020 10:18 2/22/2021 10:16   TSH Latest Ref Range: 0.380 - 5.330 uIU/mL 15.200 (H)   13.600 (H)   Free T-4 Latest Ref Range: 0.93 - 1.70 ng/dL 1.28   1.23

## 2021-03-10 NOTE — ASSESSMENT & PLAN NOTE
New problem. Chronic issues, gradual decrease in hearing in both ears. Hx of exposure to loud noses in the past. Current every day smoker. Denies dizziness, vertigo, no tinnitus, no pain or drainage.

## 2021-03-10 NOTE — PROGRESS NOTES
Chief Complaint   Patient presents with   • Lab Results   • Medication Refill     Levothroxine       HISTORY OF PRESENT ILLNESS: Patient is a 71 y.o. male, established patient who presents today to discuss medical problems as listed below:    Health Maintenance:  COMPLETED     Chronic bronchitis (HCC)  Chronic problem. Followed by pulmonology Dr. Cantor. Rare inhaler use x 2 per yr. Pt denies CP, SOB. Current every day smoker, 50 pack yrs smoking Hx, weaning himself off.     Paroxysmal A-fib (HCC)  Chronic problme, initially Dx'd 3 yrs ago. Followed by [facility] cardiology, Dr. Read. On ELiquis. 5 mg. Pt is rate controlled with metoprolol. Denies CP, SOB, dizziness. Denies bleeding.     Cardiomyopathy (HCC)  Chronic problem. Pt followed by cardiology  Dr. Read    Aortic aneurysm ([facility])  Chronic problem. Followed by vascular Dr. Velasquez.    Decreased hearing of both ears  New problem. Chronic issues, gradual decrease in hearing in both ears. Hx of exposure to loud noses in the past. Current every day smoker. Denies dizziness, vertigo, no tinnitus, no pain or drainage.       Patient Active Problem List    Diagnosis Date Noted   • Chronic bronchitis (HCC) 03/10/2021   • Paroxysmal A-fib ([facility]) 03/10/2021   • Decreased hearing of both ears 03/10/2021   • Hypothyroidism 11/27/2019   • Vitamin D deficiency 11/27/2019   • Elevated hemoglobin A1c 11/08/2019   • Subacute thyroiditis 09/21/2018   • Emphysema of lung ([facility]) 09/12/2018   • Lung nodules 09/12/2018   • Aortic aneurysm ([facility]) 09/12/2018   • Cardiomyopathy ([facility]) 08/08/2018   • Essential hypertension 07/26/2018   • History of MI (myocardial infarction) 07/26/2018   • Left elbow pain 07/26/2018   • Cigarette nicotine dependence without complication 07/26/2018   • Cough 07/26/2018   • Pacemaker 07/26/2018   • Chronic atrial fibrillation ([facility]) 07/26/2018   • Mixed hyperlipidemia 07/26/2018        Allergies: Patient has no known allergies.    Current Outpatient Medications    Medication Sig Dispense Refill   • levothyroxine (SYNTHROID) 50 MCG Tab Take 1 tablet by mouth Every morning on an empty stomach. 90 tablet 0   • metoprolol (LOPRESSOR) 25 MG Tab Take 25 mg by mouth 2 times a day.     • amiodarone (CORDARONE) 200 MG Tab Take 200 mg by mouth 2 Times a Day.  0   • digoxin (LANOXIN) 125 MCG Tab Take 125 mcg by mouth every day. For 90 days     • lisinopril (PRINIVIL) 20 MG Tab Take 5 mg by mouth every day.     • albuterol 108 (90 Base) MCG/ACT Aero Soln inhalation aerosol Inhale 2 Puffs by mouth every 6 hours as needed for Shortness of Breath. 1 Inhaler 1   • apixaban (ELIQUIS) 5mg Tab Take 5 mg by mouth 2 Times a Day.     • aspirin EC (ECOTRIN) 81 MG Tablet Delayed Response Take 81 mg by mouth every day.     • magnesium oxide (MAG-OX) 400 MG Tab Take 400 mg by mouth every day.     • pravastatin (PRAVACHOL) 80 MG tablet Take 80 mg by mouth every evening.       No current facility-administered medications for this visit.       Social History     Tobacco Use   • Smoking status: Current Every Day Smoker     Packs/day: 0.50     Years: 45.00     Pack years: 22.50     Types: Cigarettes   • Smokeless tobacco: Never Used   • Tobacco comment: since age 11 with a ~10-12 year break; he has cut down most recently to 4-5 day   Substance Use Topics   • Alcohol use: Yes     Alcohol/week: 0.6 oz     Types: 1 Cans of beer per week     Comment: monthly   • Drug use: No     Social History     Social History Narrative   • Not on file       Family History   Problem Relation Age of Onset   • Heart Attack Mother    • Heart Disease Mother    • Hypertension Mother    • Cancer Father    • Heart Attack Father    • Heart Disease Father    • Hypertension Father    • Heart Attack Paternal Grandmother    • Heart Attack Paternal Grandfather        Allergies, past medical history, past surgical history, family history, social history reviewed and updated.    Review of Systems:     - Constitutional: Negative for  "fever, chills, unexpected weight change, and fatigue/generalized weakness.     - HEENT: Gradual decrease in hearing in both ears.  Negative for headaches, vision changes, ear pain, ear discharge, rhinorrhea, sinus congestion, sore throat, and neck pain.      - Respiratory: Negative for cough, sputum production, chest congestion, dyspnea, wheezing, and crackles.      - Cardiovascular: Negative for chest pain, palpitations, orthopnea, and bilateral lower extremity edema.       - Psychiatric/Behavioral: Negative for depression, suicidal/homicidal ideation and memory loss.      All other systems reviewed and are negative    Exam:    /74   Pulse (!) 55   Temp 36.1 °C (97 °F) (Temporal)   Resp 12   Ht 1.803 m (5' 11\")   Wt 87.1 kg (192 lb)   SpO2 97%   BMI 26.78 kg/m²  Body mass index is 26.78 kg/m².    Physical Exam:  Constitutional: Well-developed and well-nourished. Not diaphoretic. No distress.   Ears:  External ears unremarkable. Tympanic membranes clear and intact.  Cardiovascular: Regular rate and rhythm, S1 and S2 without murmur, rubs, or gallops.    Chest: Effort normal. Clear to auscultation throughout. No adventitious sounds.   Neurological: Alert and oriented x 3.   Psychiatric:  Behavior, mood, and affect are appropriate.  MA/nursing note and vitals reviewed.    LABS:2/22/21   results reviewed and discussed with the patient, questions answered.    My total time spent caring for the patient on the day of the encounter was 25 minutes.   This does not include time spent on separately billable procedures/tests.     Assessment/Plan:  1. Chronic bronchitis, unspecified chronic bronchitis type (HCC)  Stable with current regimen.  Followed by pulmonology.    2. Paroxysmal A-fib (HCC)  Followed by cardiology Dr. Read    3. Cardiomyopathy, unspecified type (HCC)  Followed by cardiology    4. Aortic aneurysm without rupture, unspecified portion of aorta (HCC)  Followed by cardiology    5. Hypothyroidism, " unspecified type  Uncontrolled, improving.  Will increase levothyroxine to 50 mcg.  We will recheck labs in 2 months.  - levothyroxine (SYNTHROID) 50 MCG Tab; Take 1 tablet by mouth Every morning on an empty stomach.  Dispense: 90 tablet; Refill: 0  - TSH; Future  - FREE THYROXINE; Future    6. Decreased hearing of both ears  No mechanical obstruction cerumen impaction noted.  - REFERRAL TO ENT       Discussed with patient possible alternative diagnoses, pt is to take all medications as prescribed. If symptoms persist FU w/PCP, if symptoms worsen go to emergency room. If experiencing any side effects from prescribed medications reports to the office immediately or go to emergency room.  Reviewed indication, dosage, usage and potential adverse effects of prescribed medications. Reviewed risks and benefits of treatment plan. Patient verbalizes understanding of all instruction and verbally agrees to plan.    No follow-ups on file. annual

## 2021-03-10 NOTE — ASSESSMENT & PLAN NOTE
Chronic problem. Followed by pulmonology Dr. Cantor. Rare inhaler use x 2 per yr. Pt denies CP, SOB. Current every day smoker, 50 pack yrs smoking Hx, weaning himself off.

## 2021-03-10 NOTE — ASSESSMENT & PLAN NOTE
Chronic problme, initially Dx'd 3 yrs ago. Followed by [facility] cardiology, Dr. Read. On ELiquis. 5 mg. Pt is rate controlled with metoprolol. Denies CP, SOB, dizziness. Denies bleeding.

## 2021-04-02 ENCOUNTER — OFFICE VISIT (OUTPATIENT)
Dept: VASCULAR LAB | Facility: MEDICAL CENTER | Age: 71
End: 2021-04-02
Attending: FAMILY MEDICINE
Payer: MEDICARE

## 2021-04-02 VITALS
SYSTOLIC BLOOD PRESSURE: 124 MMHG | HEART RATE: 81 BPM | DIASTOLIC BLOOD PRESSURE: 55 MMHG | WEIGHT: 192 LBS | HEIGHT: 71 IN | BODY MASS INDEX: 26.88 KG/M2

## 2021-04-02 DIAGNOSIS — E78.2 MIXED HYPERLIPIDEMIA: ICD-10-CM

## 2021-04-02 DIAGNOSIS — I48.0 PAROXYSMAL A-FIB (HCC): ICD-10-CM

## 2021-04-02 DIAGNOSIS — I25.2 HISTORY OF MI (MYOCARDIAL INFARCTION): ICD-10-CM

## 2021-04-02 DIAGNOSIS — I71.40 ABDOMINAL AORTIC ANEURYSM (AAA) WITHOUT RUPTURE (HCC): ICD-10-CM

## 2021-04-02 DIAGNOSIS — J43.9 PULMONARY EMPHYSEMA, UNSPECIFIED EMPHYSEMA TYPE (HCC): ICD-10-CM

## 2021-04-02 DIAGNOSIS — E03.9 HYPOTHYROIDISM, UNSPECIFIED TYPE: ICD-10-CM

## 2021-04-02 DIAGNOSIS — I25.10 CORONARY ARTERY DISEASE INVOLVING NATIVE CORONARY ARTERY OF NATIVE HEART WITHOUT ANGINA PECTORIS: ICD-10-CM

## 2021-04-02 DIAGNOSIS — I10 ESSENTIAL HYPERTENSION: ICD-10-CM

## 2021-04-02 DIAGNOSIS — Z95.1 HX OF CABG: ICD-10-CM

## 2021-04-02 DIAGNOSIS — I42.9 CARDIOMYOPATHY, UNSPECIFIED TYPE (HCC): ICD-10-CM

## 2021-04-02 DIAGNOSIS — Z79.01 CHRONIC ANTICOAGULATION: ICD-10-CM

## 2021-04-02 PROCEDURE — 99212 OFFICE O/P EST SF 10 MIN: CPT

## 2021-04-02 PROCEDURE — 99204 OFFICE O/P NEW MOD 45 MIN: CPT | Performed by: FAMILY MEDICINE

## 2021-04-02 RX ORDER — ROSUVASTATIN CALCIUM 20 MG/1
20 TABLET, COATED ORAL EVERY EVENING
Qty: 90 TABLET | Refills: 3 | Status: SHIPPED | OUTPATIENT
Start: 2021-04-02 | End: 2021-08-04

## 2021-04-02 ASSESSMENT — ENCOUNTER SYMPTOMS
BRUISES/BLEEDS EASILY: 0
VOMITING: 0
FOCAL WEAKNESS: 0
COUGH: 0
TREMORS: 0
SEIZURES: 0
NAUSEA: 0
WEAKNESS: 0
MYALGIAS: 0
PALPITATIONS: 0
SHORTNESS OF BREATH: 0
ABDOMINAL PAIN: 0
CHILLS: 0
WHEEZING: 0
FEVER: 0
DIZZINESS: 0
HEADACHES: 0
HEMOPTYSIS: 0
DIARRHEA: 0

## 2021-04-02 ASSESSMENT — FIBROSIS 4 INDEX: FIB4 SCORE: 2.26

## 2021-04-02 NOTE — PATIENT INSTRUCTIONS
MEDITERRANEAN DIET PATIENT GUIDES:   1) MEDITERRANEAN LIVING GUIDE: https://www.mediterraneanliving.com/  2) MED PLATE METHOD: https://www.nutrition.va.gov/docs/UpdatedPatientEd/Mediterraneandiet.pdf  3) SAMPLE MENU: https://www.[facility] [facility]/wp-content/uploads/2013/06/The-Mediterranean-Diet.pdf    7 SIMPLE STEPS TO FOLLOW THE MEDITERRANEAN DIET:   https://Weimi.com/7-simple-steps-to-follow-the-mediterranean-diet/    Eating like those who live in the Mediterranean region has been shown to promote health and decrease risk of many chronic diseases. Following a traditional Mediterranean-style eating pattern has been shown to decrease some forms of cancer, protect against cognitive decline, improve eye health, decrease the risk of type 2 diabetes, help manage blood pressure, reduce cardiovascular disease, and is more effective than a low-fat diet for weight loss. Eating the Mediterranean way is not only healthy, it is delicious and satisfying. Foods that you once thought of as too high in fat or unhealthy, including nuts, olive oil, olives, and whole grains, become an everyday part of your diet. The following simple steps will help you eat the Med Way every day.    1. CHANGE YOUR PROTEINS     Replace some of the meat in your dish with plant proteins such as beans, nuts, and seeds often.    Eat fish and seafood at least two to three times per week. Include fatty fish, such as mackerel or salmon at least once a week. Eat fried fish only occasionally.    Choose white-meat poultry such as turkey or chicken breast.    Limit red meat and/or choose lean red meat.    Greatly limit or eliminate processed meats.    Mediterranean diet    2. SWAP YOUR FATS     Choose olive oil.    Replace solid fats such as butter and margarine with olive oil or canola oil.    Use olive oil for cooking, in dressings, and marinades.    Aim to consume at least four tablespoons of olive oil a day, while keeping within your calorie  budget.    mediterranean diet    3. EAT MORE VEGETABLES     Get at least three servings (three cups) of vegetables per day.    Choose a variety of colors.    Eat more dark green leafy vegetables such as collards, kale, spinach, chard, and turnip greens.    mediterranean diet    4. EAT MORE FRUITS     Get at least two servings (two cups) of fruits per day.    Choose a variety of colors.    Include berries often.    med diet    5. SNACK ON NUTS AND SEEDS     Choose at least three ounces (three small handfuls) of nuts and seeds per week, while keeping within your calorie budget.    Avoid candied, honey-roasted, and heavily salted nuts and seeds.    mediterranean diet    6. MAKE YOUR GRAINS WHOLE     Eat grains as grains    Choose whole grains such as oatmeal, quinoa, brown rice, and popcorn.    Look for “whole” in the first ingredient on the ingredient list (e.g., “whole wheat”) when choosing bread, pasta, and other grain-based foods.    mediterranean diet recipes    7.  RETHINK SWEETS     Limit your sugar intake    Choose no more than three servings per week of high-sugar foods and drinks such as sugar-sweetened snacks, candies, desserts, or beverages.    For more information about eating the Med Way, visit [facility].

## 2021-04-02 NOTE — PROGRESS NOTES
INITIAL VASCULAR MEDICINE VISIT  Subjective:   Marty Freedman III is a 71 y.o. y.o. male  who presents today 22  for   Chief Complaint   Patient presents with   • New Patient     AAA     initially referred by Sury Cueva APRN for med mgmt and surveillance of AAA     HPI:    AAA:   Interval hx:  Feeling well. No concerns   Denies abd pain, distal ischemic limb symptoms such as discoloration, cool extremities, claudication.   Current size:  3.2 per AAA duplex on 2020  Pertinent pmhx:  First noted on LDCT for lung CA screening on .  Fhx of father with AAA, unfortunately  postoperative from GI complications from surgery.    HTN:  Current HTN concerns: Denies   Current ADRs: No  HTN sx:  No current blurred or changed vision, chest pain, shortness of breath, headache, nausea, dizziness/vertigo   Home BP los/50-60s most days   24h ABPM completed: not tested  Adherence to current HTN meds: compliant all of the time  Lifestyle factors:   Weight Change since last visit: stable   BMI Readings from Last 5 Encounters:   21 26.78 kg/m²   03/10/21 26.78 kg/m²   20 25.44 kg/m²   19 25.87 kg/m²   19 25.66 kg/m²     Diet pattern changes since last visit: common adult  Daily salt intake estimate:  Low     Perceived barriers to care: none      Hyperlipidemia:    Stable, no current concerns  Current treatment: Moderate intensity statin  prava   Myalgias? No  Other adverse drug reactions? No  Last lipid profile: reviewed with patient, as noted below   Hx of clinical ASCVD events: History of prior MI >12 months ago . Initial MI age 38, s/p CABG , PAF, cardiomyopathy    Antiplatelet/anticoagulation:   Yes, Details: Eliquis, ASA, no bleeding noted     Type 2 DM:  No     CKD:    No     Sleeping disorder/BRAD:   No     Hypothyroidism:  Stable, no issues. Energy level adequate  Tolerating meds  Last TSH elevated, pending new labs    Past Medical History:   Diagnosis Date   • Anesthesia   "   ponv   • Arthritis     shoulder   • Atrial fibrillation (HCC)    • Chickenpox    • Dental disorder     Full set dentures   • Disorder of thyroid     Hypothyroid   • [place] measles    • Hemorrhagic disorder (HCC)     On Blood thinners   • High cholesterol    • Hypertension    • Influenza    • Mumps    • Myocardial infarct (HCC) 88, ?89, 2007    x 3   • Other and unspecified angina pectoris 10/2016    Defibulator   • Pacemaker 10/20/2016    Pacemaker/Defib   • Pain     shoulder     Past Surgical History:   Procedure Laterality Date   • COLONOSCOPY  10/18/2018    Procedure: COLONOSCOPY - W/POSS BX, DILATION, POLYPECTOMY, CONTROL OF HEMORRHAGE;  Surgeon: Pineda Horvath M.D.;  Location: SURGERY [facility];  Service: EUS   • PACEMAKER INSERTION  10/20/2016   • INGUINAL HERNIA REPAIR BILATERAL  3/2/2015    Performed by Jack Tobias M.D. at SURGERY SAME DAY [facility] ORS   • COLONOSCOPY  2008   • MULTIPLE CORONARY ARTERY BYPASS  1991    stents (X 3 placement= stents total)   • APPENDECTOMY CHILD     • OTHER ORTHOPEDIC SURGERY      knee arthoscopy, bilaterally (\"early 80's\")   • TONSILLECTOMY  as a child        Current Outpatient Medications:   •  rosuvastatin, 20 mg, Oral, Q EVENING  •  levothyroxine, 50 mcg, Oral, AM ES, Taking  •  metoprolol tartrate, 25 mg, Oral, BID, Taking  •  amiodarone, 200 mg, Oral, BID, Taking  •  digoxin, 125 mcg, Oral, DAILY, Taking  •  lisinopril, 5 mg, Oral, DAILY, Taking  •  albuterol, 2 Puff, Inhalation, Q6HRS PRN, Taking  •  apixaban, 5 mg, Oral, BID, Taking  •  aspirin EC, 81 mg, Oral, DAILY, Taking  •  magnesium oxide, 400 mg, Oral, DAILY, Taking   No Known Allergies  Family History   Problem Relation Age of Onset   • Heart Attack Mother    • Heart Disease Mother    • Hypertension Mother    • Cancer Father    • Heart Attack Father         s/p CABG   • Heart Disease Father         AAA   • Hypertension Father    • Heart Attack Paternal Grandmother    • Heart Attack Paternal " "Grandfather       Social History     Tobacco Use   • Smoking status: Current Every Day Smoker     Packs/day: 0.50     Years: 45.00     Pack years: 22.50     Types: Cigarettes   • Smokeless tobacco: Never Used   • Tobacco comment: since age 11 with a ~10-12 year break; he has cut down most recently to 4-5 day   Substance Use Topics   • Alcohol use: Yes     Alcohol/week: 0.6 oz     Types: 1 Cans of beer per week     Comment: monthly   • Drug use: No     Review of Systems   Constitutional: Negative for chills, fever and malaise/fatigue.   Respiratory: Negative for cough, hemoptysis, shortness of breath and wheezing.    Cardiovascular: Negative for chest pain, palpitations and leg swelling.   Gastrointestinal: Negative for abdominal pain, diarrhea, nausea and vomiting.   Musculoskeletal: Negative for joint pain and myalgias.   Skin: Negative for itching and rash.   Neurological: Negative for dizziness, tremors, focal weakness, seizures, weakness and headaches.   Endo/Heme/Allergies: Does not bruise/bleed easily.      Objective:     Vitals:    04/02/21 0913   BP: 124/55   BP Location: Left arm   Patient Position: Sitting   BP Cuff Size: Adult   Pulse: 81   Weight: 87.1 kg (192 lb)   Height: 1.803 m (5' 11\")      BP Readings from Last 5 Encounters:   04/02/21 124/55   03/10/21 110/74   12/03/20 140/78   11/27/19 120/78   11/08/19 122/64      Body mass index is 26.78 kg/m².  Physical Exam  Vitals reviewed.   Constitutional:       Appearance: Normal appearance.   HENT:      Head: Normocephalic and atraumatic.      Nose: Nose normal.      Mouth/Throat:      Mouth: Mucous membranes are moist.      Pharynx: Oropharynx is clear.   Eyes:      Extraocular Movements: Extraocular movements intact.      Conjunctiva/sclera: Conjunctivae normal.   Cardiovascular:      Rate and Rhythm: Normal rate and regular rhythm.      Pulses: Normal pulses.           Carotid pulses are 2+ on the right side and 2+ on the left side.       Radial " pulses are 2+ on the right side and 2+ on the left side.      Heart sounds: Normal heart sounds.      Comments:    Pulmonary:      Effort: Pulmonary effort is normal.      Breath sounds: Normal breath sounds.   Abdominal:      General: Abdomen is flat. Bowel sounds are normal.      Palpations: Abdomen is soft. There is no mass.   Musculoskeletal:      Cervical back: Normal range of motion and neck supple.      Right lower leg: No edema.      Left lower leg: No edema.   Skin:     General: Skin is warm and dry.      Capillary Refill: Capillary refill takes less than 2 seconds.   Neurological:      General: No focal deficit present.      Mental Status: He is alert and oriented to person, place, and time. Mental status is at baseline.   Psychiatric:         Mood and Affect: Mood normal.         Behavior: Behavior normal.       Lab Results   Component Value Date    CHOLSTRLTOT 186 11/05/2020     (H) 11/05/2020    HDL 53 11/05/2020    TRIGLYCERIDE 118 11/05/2020           Lab Results   Component Value Date    HBA1C 6.4 (H) 11/11/2019      Lab Results   Component Value Date    SODIUM 139 11/05/2020    POTASSIUM 4.5 11/05/2020    CHLORIDE 102 11/05/2020    CO2 25 11/05/2020    GLUCOSE 85 11/05/2020    BUN 15 11/05/2020    CREATININE 0.98 11/05/2020    IFAFRICA >60 11/05/2020    IFNOTAFR >60 11/05/2020        Lab Results   Component Value Date    WBC 8.6 11/05/2020    RBC 4.23 (L) 11/05/2020    HEMOGLOBIN 15.1 11/05/2020    HEMATOCRIT 45.9 11/05/2020    .5 (H) 11/05/2020    MCH 35.7 (H) 11/05/2020    MCHC 32.9 (L) 11/05/2020    MPV 12.4 11/05/2020     VASCULAR IMAGING:     LDCT 2018  3.  Small 3 cm abdominal aortic aneurysm, partially visualized.    LDCT lung CA 12/23/20  2.2 mm and 6.8 mm nodules at the left lung unchanged.  Punctate nodule left lower lobe new since previous examination.  Hyperexpanded lungs. No pleural effusions.  Atherosclerotic changes.   Lung RADS: 2 - Benign Appearance or  Behavior  Nodules with a very low likelihood of becoming a clinically active cancer due to size or lack of growth  Moderate calcified plaque is located within the aorta.   Calcifications are located within the aortic valve.   There are coronary artery calcifications.   Sternotomy wires are demonstrated.  Anterior bypass grafts are demonstrated.    AAA duplex 12/23/20   3.2 cm infrarenal abdominal aortic aneurysm previously measured 3.1 cm.    Medical Decision Making:  Today's Assessment / Status / Plan:     1. Abdominal aortic aneurysm (AAA) without rupture (HCC)  Comp Metabolic Panel    Lipid Profile    LIPOPROTEIN A    CRP HIGH SENSITIVE (CARDIAC)   2. Paroxysmal A-fib (HCC)     3. Essential hypertension  Comp Metabolic Panel    Lipid Profile    LIPOPROTEIN A    CRP HIGH SENSITIVE (CARDIAC)   4. History of MI (myocardial infarction)     5. Mixed hyperlipidemia  Comp Metabolic Panel    Lipid Profile    LIPOPROTEIN A    CRP HIGH SENSITIVE (CARDIAC)    rosuvastatin (CRESTOR) 20 MG Tab   6. Cardiomyopathy, unspecified type (HCC)     7. Pulmonary emphysema, unspecified emphysema type (HCC)     8. Hypothyroidism, unspecified type     9. Chronic anticoagulation     10. Hx of CABG     11. Coronary artery disease involving native coronary artery of native heart without angina pectoris       Patient Type: Secondary Prevention, polyvascular disease    Etiology of Established CVD if Present:   1) AAA  2) CAD, s/p CABG    AAA, asymptomatic, mild   3.2cm on most recent imaging 12/2020  Stable, no sx   - continue med mgmt and tobacco cessation efforts (see below)   - surveillance AAA duplex annually (Dec)   - consider repeat CTA if rapid expansion     CAD, s/p CABG in 1991  Stable, no sx  Early MI - strong fhx of CAD on paternal side , possible FH   - continue secondary med mgmt   - continue ongoing care with cardiology     ANTITHROMBOTIC THERAPY:  Anti-Platelet/Anti-Coagulant Tx recommended: yes  Indication: PAF, CVA proph -  high LISA-vasc    Expected duration: indefinite    Last CBC, BMP: 11/2020  Antithrombotic therapy plan:  - continue eliquis 5mg BID, ASA 81mg daily as per cardiology recommended   - monitor for bleeding, annual review of risks/benefits   - check CBC, BMP (CMP if any underlying liver disease), and monitor CrCl as needed Q6mo while on DOAC  - avoid anti-inflammatories (eg. Advil, ibuprofen, Aleve, naproxen, etc) while anticoagulated   - avoid skiing or other dangerous activities to reduce risk of head injury and brain bleeds  - recommended to see your PCP to discuss if you need age-appropriate cancer screenings as a small % of blood clots may be caused by an underlying malignancy  - if any bleeding lasting 30min without stopping, please seek care with your PCP, urgent care, or ED  - reversal agents for most blood thinners are now available and used if you have major bleeding    LIPID MANAGEMENT:   Qualifies for Statin Therapy Based on 2018 ACC/AHA Guidelines: yes, Secondary Prevention - Very high risk ASCVD - 2 major events or 1 event with other high-risk conditions  10-yr ASCVD risk score:  N/A  Major ASCVD events: History of prior MI >12 months ago   High-risk conditions: Prior CABG or PCI outside of the major ASCVD events , Hypertension  and Current smoking   Risk-enhancers: N/A  Currently on Statin: Yes  Strong Fhx of early CAD, ? Possible FH though I do not have baseline LDL >190 available in labs   Treatment goals: LDL-C <70 (consider non-HDL-C <100, apoB <80)  At goal? No  Plan:   - reinforced ongoing TLC measures as noted   - monitor labs   Meds:   - stop prava, start rosuva 20mg daily  - add zetia as next agent  - [id number] candidate due to secondary prevention      BLOOD PRESSURE MANAGEMENT:  ACC/AHA (2017) goal <130/80  Home BP at goal:  yes  Office BP at goal:  yes  Indications of end organ damage: yes  Device candidate? no  Plan:   Monitoring:   - start/continue home BP monitoring, reviewed correct  technique, provide BP log and instructions  - order 24h ABPM:  NO  - monitor lytes/gfr routinely   - contact office if BP consistently >140/>90 to discussion of tx adjustments   Medications:  ACEi/ARB: continue lisinopril 20mg daily   DHP-CCB: none   Thiazide: none   Rao-receptor Antagonist: not indicated at this time   Other:   Peripheral Alpha Blocker: not indicated   Loop: not indicated   BB: continue metoprolol 25mg BID -mainly for rate-control in Afib     Glycemic Status: Normal    Smoking: contemplative stage  Prior cessation for 2-3yrs via [facility], unfortunately returned.   Has tried NRT in the past w/o relief.   Has tried Chantix - trouble with bad dreams.      reports that he has been smoking cigarettes. He has a 22.50 pack-year smoking history. He has never used smokeless tobacco.   Provided strong recommendation for complete cessation and informed this is the primary contributor to the majority of all ASCVD and cancer-related conditions and can result in significant morbidity and early mortality.   - reviewed resources for cessation including tobacco cessation clinic visit, pharmacotx meds, quit lines  - review at every visit   Provided 8 minutes of face-to-face counseling on above topics     Physical Activity: continue healthy activity to improve CV fitness, see care instructions for additional details     Weight Management and Nutrition: Dietary plan was discussed with patient at this visit including DASH, low sodium and/or as outlined in care instructions     Other:     1) PAF  - continue OAC, BB  - defer ongoing care to cardiology     2) hypothyroidism  Last TSH elevated, adjusting per PCP  Uncontrolled will have effect on BP and HLD mgmt   - continue meds, update labs, defer mgmt to PCP     Instructed to follow-up with PCP for remainder of adult medical needs: yes  We will partner with other providers in the management of established vascular disease and cardiometabolic risk  factors.    Studies to Be Obtained: Ao/iliac duplex - 12/2021 - not ordered, aprn to track    Labs to Be Obtained: as noted above     Follow up in: 2 months    Felipe Velasquez M.D.  [facility]   [facility]   [phone]

## 2021-05-28 ENCOUNTER — DOCUMENTATION (OUTPATIENT)
Dept: VASCULAR LAB | Facility: MEDICAL CENTER | Age: 71
End: 2021-05-28

## 2021-05-28 NOTE — PROGRESS NOTES
Marty Freedman III  No showed to follow-up with the vascular medicine clinic. Scheduling staff will contact to reschedule with APRN and remind patient of the importance of maintaining appointments as scheduled or to contact our office at least 24 hours in advance if they need to reschedule.       [facility]   [facility]   [phone]

## 2021-06-02 RX ORDER — PRAVASTATIN SODIUM 80 MG/1
80 TABLET ORAL DAILY
COMMUNITY
Start: 2021-01-20 | End: 2021-10-21 | Stop reason: SDUPTHER

## 2021-06-02 RX ORDER — LISINOPRIL 5 MG/1
5 TABLET ORAL DAILY
COMMUNITY
Start: 2021-03-24 | End: 2024-02-13

## 2021-06-02 RX ORDER — NITROGLYCERIN 0.4 MG/1
0.4 TABLET SUBLINGUAL PRN
COMMUNITY
Start: 2020-12-02

## 2021-06-02 RX ORDER — MAGNESIUM OXIDE 400 MG/1
400 TABLET ORAL DAILY
COMMUNITY
Start: 2020-12-10 | End: 2021-06-04

## 2021-06-02 RX ORDER — AMIODARONE HYDROCHLORIDE 200 MG/1
TABLET ORAL
COMMUNITY
Start: 2021-04-09 | End: 2021-06-04

## 2021-06-02 RX ORDER — NITROGLYCERIN 0.4 MG/1
TABLET SUBLINGUAL
COMMUNITY
Start: 2021-06-01 | End: 2021-06-04

## 2021-06-02 RX ORDER — PRAVASTATIN SODIUM 80 MG/1
80 TABLET ORAL
COMMUNITY
Start: 2021-05-17 | End: 2021-06-04

## 2021-06-02 RX ORDER — NITROGLYCERIN 0.4 MG/1
0.4 TABLET SUBLINGUAL
COMMUNITY
Start: 2021-05-19 | End: 2021-06-04

## 2021-06-02 RX ORDER — LISINOPRIL 5 MG/1
5 TABLET ORAL
COMMUNITY
Start: 2021-05-19 | End: 2021-06-04

## 2021-06-03 ENCOUNTER — HOSPITAL ENCOUNTER (OUTPATIENT)
Dept: LAB | Facility: MEDICAL CENTER | Age: 71
End: 2021-06-03
Attending: NURSE PRACTITIONER
Payer: MEDICARE

## 2021-06-03 ENCOUNTER — APPOINTMENT (OUTPATIENT)
Dept: MEDICAL GROUP | Facility: PHYSICIAN GROUP | Age: 71
End: 2021-06-03
Payer: MEDICARE

## 2021-06-03 DIAGNOSIS — E03.9 HYPOTHYROIDISM, UNSPECIFIED TYPE: ICD-10-CM

## 2021-06-03 PROCEDURE — 36415 COLL VENOUS BLD VENIPUNCTURE: CPT

## 2021-06-03 PROCEDURE — 84439 ASSAY OF FREE THYROXINE: CPT

## 2021-06-03 PROCEDURE — 84443 ASSAY THYROID STIM HORMONE: CPT

## 2021-06-04 ENCOUNTER — OFFICE VISIT (OUTPATIENT)
Dept: MEDICAL GROUP | Facility: PHYSICIAN GROUP | Age: 71
End: 2021-06-04
Payer: MEDICARE

## 2021-06-04 VITALS
SYSTOLIC BLOOD PRESSURE: 110 MMHG | HEART RATE: 60 BPM | DIASTOLIC BLOOD PRESSURE: 64 MMHG | OXYGEN SATURATION: 94 % | RESPIRATION RATE: 14 BRPM | HEIGHT: 71 IN | TEMPERATURE: 97.2 F | WEIGHT: 193.2 LBS | BODY MASS INDEX: 27.05 KG/M2

## 2021-06-04 DIAGNOSIS — L85.3 DRY SKIN: ICD-10-CM

## 2021-06-04 DIAGNOSIS — R23.0 PERIPHERAL CYANOSIS: ICD-10-CM

## 2021-06-04 DIAGNOSIS — E03.9 HYPOTHYROIDISM, UNSPECIFIED TYPE: ICD-10-CM

## 2021-06-04 DIAGNOSIS — B35.1 FUNGAL TOENAIL INFECTION: ICD-10-CM

## 2021-06-04 LAB
T4 FREE SERPL-MCNC: 1.41 NG/DL (ref 0.93–1.7)
TSH SERPL DL<=0.005 MIU/L-ACNC: 11.4 UIU/ML (ref 0.38–5.33)

## 2021-06-04 PROCEDURE — 99214 OFFICE O/P EST MOD 30 MIN: CPT | Performed by: NURSE PRACTITIONER

## 2021-06-04 RX ORDER — LEVOTHYROXINE SODIUM 0.07 MG/1
75 TABLET ORAL
Qty: 90 TABLET | Refills: 1 | Status: SHIPPED | OUTPATIENT
Start: 2021-06-04 | End: 2021-08-04

## 2021-06-04 RX ORDER — AMMONIUM LACTATE 12 G/100G
1 LOTION TOPICAL 3 TIMES DAILY PRN
Qty: 500 G | Refills: 3 | Status: SHIPPED | OUTPATIENT
Start: 2021-06-04 | End: 2021-08-04

## 2021-06-04 ASSESSMENT — FIBROSIS 4 INDEX: FIB4 SCORE: 2.26

## 2021-06-04 NOTE — ASSESSMENT & PLAN NOTE
Results for KALIE SANDERS III (MRN [id number]) as of 6/4/2021 12:10   Ref. Range 11/5/2020 10:25 12/23/2020 10:14 12/23/2020 10:18 2/22/2021 10:16 6/3/2021 07:22   TSH Latest Ref Range: 0.380 - 5.330 uIU/mL 15.200 (H)   13.600 (H) 11.400 (H)   Free T-4 Latest Ref Range: 0.93 - 1.70 ng/dL 1.28   1.23 1.41   Currently on levothyroxine 50 mcg.  Reports feeling sluggish low energy.

## 2021-06-04 NOTE — PROGRESS NOTES
Chief Complaint   Patient presents with   • Lab Results       HISTORY OF PRESENT ILLNESS: Patient is a 71 y.o. male, established patient who presents today to discuss medical problems as listed below:    Health Maintenance:  COMPLETED     Hypothyroidism  Results for KALIE SANDERS III (MRN [id number]) as of 6/4/2021 12:10   Ref. Range 11/5/2020 10:25 12/23/2020 10:14 12/23/2020 10:18 2/22/2021 10:16 6/3/2021 07:22   TSH Latest Ref Range: 0.380 - 5.330 uIU/mL 15.200 (H)   13.600 (H) 11.400 (H)   Free T-4 Latest Ref Range: 0.93 - 1.70 ng/dL 1.28   1.23 1.41   Currently on levothyroxine 50 mcg.  Reports feeling sluggish low energy.    Peripheral cyanosis  New problem.  Patient reports bilateral discoloration of feet and ankles, with associated symptom of dry skin.  Patient denies pain, claudication, tingling, neuropathy, numbness, swelling, no itching.  He does report having dry skin in general, he is also has been getting suntan which is contributing to dryness.  He has history of toenail fungus.      Patient Active Problem List    Diagnosis Date Noted   • Peripheral cyanosis 06/04/2021   • Abdominal aortic aneurysm (AAA) without rupture ([facility]) 04/02/2021   • Chronic anticoagulation 04/02/2021   • Hx of CABG 04/02/2021   • Coronary artery disease involving native coronary artery of native heart without angina pectoris 04/02/2021   • Chronic bronchitis ([facility]) 03/10/2021   • Paroxysmal A-fib ([facility]) 03/10/2021   • Decreased hearing of both ears 03/10/2021   • Hypothyroidism 11/27/2019   • Vitamin D deficiency 11/27/2019   • Elevated hemoglobin A1c 11/08/2019   • Subacute thyroiditis 09/21/2018   • Emphysema of lung ([facility]) 09/12/2018   • Lung nodules 09/12/2018   • Aortic aneurysm ([facility]) 09/12/2018   • Cardiomyopathy ([facility]) 08/08/2018   • Essential hypertension 07/26/2018   • History of MI (myocardial infarction) 07/26/2018   • Left elbow pain 07/26/2018   • Cigarette nicotine dependence without complication 07/26/2018   • Cough  07/26/2018   • Pacemaker 07/26/2018   • Chronic atrial fibrillation (HCC) 07/26/2018   • Mixed hyperlipidemia 07/26/2018        Allergies: Patient has no known allergies.    Current Outpatient Medications   Medication Sig Dispense Refill   • levothyroxine (SYNTHROID) 75 MCG Tab Take 1 tablet by mouth every morning on an empty stomach. 90 tablet 1   • ammonium lactate (LAC-HYDRIN) 12 % Lotion Apply 1 Application topically 3 times a day as needed. 500 g 3   • nitroglycerin (NITROSTAT) 0.4 MG SL Tab 0.4 mg.     • pravastatin (PRAVACHOL) 80 MG tablet Take 80 mg by mouth every day.     • rosuvastatin (CRESTOR) 20 MG Tab Take 1 tablet by mouth every evening. 90 tablet 3   • metoprolol (LOPRESSOR) 25 MG Tab Take 25 mg by mouth 2 times a day.     • amiodarone (CORDARONE) 200 MG Tab Take 200 mg by mouth 2 Times a Day.  0   • digoxin (LANOXIN) 125 MCG Tab Take 125 mcg by mouth every day. For 90 days     • lisinopril (PRINIVIL) 20 MG Tab Take 5 mg by mouth every day.     • albuterol 108 (90 Base) MCG/ACT Aero Soln inhalation aerosol Inhale 2 Puffs by mouth every 6 hours as needed for Shortness of Breath. 1 Inhaler 1   • apixaban (ELIQUIS) 5mg Tab Take 5 mg by mouth 2 Times a Day.     • aspirin EC (ECOTRIN) 81 MG Tablet Delayed Response Take 81 mg by mouth every day.     • magnesium oxide (MAG-OX) 400 MG Tab Take 400 mg by mouth every day.     • lisinopril (PRINIVIL) 5 MG Tab Take 5 mg by mouth every day.       No current facility-administered medications for this visit.       Social History     Tobacco Use   • Smoking status: Current Every Day Smoker     Packs/day: 0.50     Years: 45.00     Pack years: 22.50     Types: Cigarettes   • Smokeless tobacco: Never Used   • Tobacco comment: since age 11 with a ~10-12 year break; he has cut down most recently to 4-5 day   Vaping Use   • Vaping Use: Never used   Substance Use Topics   • Alcohol use: Yes     Alcohol/week: 0.6 oz     Types: 1 Cans of beer per week     Comment: monthly  "  • Drug use: No     Social History     Social History Narrative   • Not on file       Family History   Problem Relation Age of Onset   • Heart Attack Mother    • Heart Disease Mother    • Hypertension Mother    • Cancer Father    • Heart Attack Father         s/p CABG   • Heart Disease Father         AAA   • Hypertension Father    • Heart Attack Paternal Grandmother    • Heart Attack Paternal Grandfather        Allergies, past medical history, past surgical history, family history, social history reviewed and updated.    Review of Systems:     - Constitutional: Negative for fever, chills, unexpected weight change, and fatigue/generalized weakness.     - Respiratory: Negative for cough, sputum production, chest congestion, dyspnea, wheezing, and crackles.      - Cardiovascular: Negative for chest pain, palpitations, orthopnea, and bilateral lower extremity edema.     - Skin: Bluish discoloration of both feet and ankles.   - Psychiatric/Behavioral: Negative for depression, suicidal/homicidal ideation and memory loss.      All other systems reviewed and are negative    Exam:    /64   Pulse 60   Temp 36.2 °C (97.2 °F) (Temporal)   Resp 14   Ht 1.803 m (5' 11\")   Wt 87.6 kg (193 lb 3.2 oz)   SpO2 94%   BMI 26.95 kg/m²  Body mass index is 26.95 kg/m².    Physical Exam:  Constitutional: Well-developed and well-nourished. Not diaphoretic. No distress.   Cardiovascular: Regular rate and rhythm, S1 and S2 without murmur, rubs, or gallops.    Chest: Effort normal. Clear to auscultation throughout. No adventitious sounds. Extremities: No cyanosis, clubbing, erythema, nor edema. Distal pulses intact and symmetric.   Neurological: Alert and oriented x 3.   Psychiatric:  Behavior, mood, and affect are appropriate.  MA/nursing note and vitals reviewed.    LABS: 6/2021  results reviewed and discussed with the patient, questions answered.    My total time spent caring for the patient on the day of the encounter was 25 " minutes.   This does not include time spent on separately billable procedures/tests.     Assessment/Plan:  1. Hypothyroidism, unspecified type  Uncontrolled, improving.  Will up the dose of levothyroxine to 75 mcg.  We recheck in 2 months.  - levothyroxine (SYNTHROID) 75 MCG Tab; Take 1 tablet by mouth every morning on an empty stomach.  Dispense: 90 tablet; Refill: 1  - TSH; Future  - FREE THYROXINE; Future    2. Fungal toenail infection  Patient to follow-up with podiatry.  - REFERRAL TO PODIATRY    3. Peripheral cyanosis  Uncontrolled, stable.  Will obtain ALLY test to check peripheral arterial flow.  Advised to keep well-hydrated, keep skin moisturized and use UV protection lotion to avoid excessive sun exposure.   - US-EXTREMITY ARTERY LOWER BILAT W/ALLY (COMBO); Future    4. Dry skin  Uncontrolled, stable.  Discussed potential etiology.  Advised to keep well-hydrated, protect skin from excessive sun exposure.  Trial of Lac-Hydrin.  - ammonium lactate (LAC-HYDRIN) 12 % Lotion; Apply 1 Application topically 3 times a day as needed.  Dispense: 500 g; Refill: 3       Discussed with patient possible alternative diagnoses, pt is to take all medications as prescribed. If symptoms persist FU w/PCP, if symptoms worsen go to emergency room. If experiencing any side effects from prescribed medications reports to the office immediately or go to emergency room.  Reviewed indication, dosage, usage and potential adverse effects of prescribed medications. Reviewed risks and benefits of treatment plan. Patient verbalizes understanding of all instruction and verbally agrees to plan.    No follow-ups on file. annual

## 2021-06-04 NOTE — ASSESSMENT & PLAN NOTE
New problem.  Patient reports bilateral discoloration of feet and ankles, with associated symptom of dry skin.  Patient denies pain, claudication, tingling, neuropathy, numbness, swelling, no itching.  He does report having dry skin in general, he is also has been getting suntan which is contributing to dryness.  He has history of toenail fungus.

## 2021-07-06 ENCOUNTER — HOSPITAL ENCOUNTER (OUTPATIENT)
Dept: RADIOLOGY | Facility: MEDICAL CENTER | Age: 71
End: 2021-07-06
Attending: NURSE PRACTITIONER
Payer: MEDICARE

## 2021-07-06 DIAGNOSIS — R23.0 PERIPHERAL CYANOSIS: ICD-10-CM

## 2021-07-06 PROCEDURE — 93922 UPR/L XTREMITY ART 2 LEVELS: CPT | Mod: 26,GZ | Performed by: INTERNAL MEDICINE

## 2021-07-06 PROCEDURE — 93922 UPR/L XTREMITY ART 2 LEVELS: CPT

## 2021-07-07 ENCOUNTER — TELEPHONE (OUTPATIENT)
Dept: MEDICAL GROUP | Facility: PHYSICIAN GROUP | Age: 71
End: 2021-07-07

## 2021-07-07 NOTE — TELEPHONE ENCOUNTER
Phone Number Called: [phone] (home)         Call outcome: Spoke to patient regarding message below.    Message: Called to inform patient vascular ultrasound of the extremities is normal

## 2021-07-14 ENCOUNTER — PATIENT MESSAGE (OUTPATIENT)
Dept: HEALTH INFORMATION MANAGEMENT | Facility: OTHER | Age: 71
End: 2021-07-14

## 2021-07-15 ENCOUNTER — APPOINTMENT (OUTPATIENT)
Dept: VASCULAR LAB | Facility: MEDICAL CENTER | Age: 71
End: 2021-07-15
Payer: MEDICARE

## 2021-08-03 ENCOUNTER — HOSPITAL ENCOUNTER (OUTPATIENT)
Dept: LAB | Facility: MEDICAL CENTER | Age: 71
End: 2021-08-03
Attending: NURSE PRACTITIONER
Payer: MEDICARE

## 2021-08-03 DIAGNOSIS — E03.9 HYPOTHYROIDISM, UNSPECIFIED TYPE: ICD-10-CM

## 2021-08-03 LAB
T4 FREE SERPL-MCNC: 1.93 NG/DL (ref 0.93–1.7)
TSH SERPL DL<=0.005 MIU/L-ACNC: 6.03 UIU/ML (ref 0.38–5.33)

## 2021-08-03 PROCEDURE — 84443 ASSAY THYROID STIM HORMONE: CPT

## 2021-08-03 PROCEDURE — 36415 COLL VENOUS BLD VENIPUNCTURE: CPT

## 2021-08-03 PROCEDURE — 84439 ASSAY OF FREE THYROXINE: CPT

## 2021-08-04 ENCOUNTER — OFFICE VISIT (OUTPATIENT)
Dept: MEDICAL GROUP | Facility: PHYSICIAN GROUP | Age: 71
End: 2021-08-04
Payer: MEDICARE

## 2021-08-04 VITALS
BODY MASS INDEX: 27.22 KG/M2 | SYSTOLIC BLOOD PRESSURE: 120 MMHG | OXYGEN SATURATION: 94 % | DIASTOLIC BLOOD PRESSURE: 64 MMHG | HEART RATE: 61 BPM | RESPIRATION RATE: 12 BRPM | HEIGHT: 71 IN | TEMPERATURE: 97.4 F | WEIGHT: 194.4 LBS

## 2021-08-04 DIAGNOSIS — I10 ESSENTIAL HYPERTENSION: ICD-10-CM

## 2021-08-04 DIAGNOSIS — I48.20 CHRONIC ATRIAL FIBRILLATION (HCC): ICD-10-CM

## 2021-08-04 DIAGNOSIS — Z95.0 PACEMAKER: ICD-10-CM

## 2021-08-04 DIAGNOSIS — E03.9 HYPOTHYROIDISM, UNSPECIFIED TYPE: ICD-10-CM

## 2021-08-04 PROBLEM — I48.0 PAROXYSMAL A-FIB (HCC): Status: RESOLVED | Noted: 2021-03-10 | Resolved: 2021-08-04

## 2021-08-04 PROBLEM — I48.0 PAROXYSMAL ATRIAL FIBRILLATION (HCC): Status: ACTIVE | Noted: 2020-12-09

## 2021-08-04 PROBLEM — I47.20 VENTRICULAR TACHYCARDIA (HCC): Status: ACTIVE | Noted: 2020-12-09

## 2021-08-04 PROBLEM — Z95.810 AICD (AUTOMATIC CARDIOVERTER/DEFIBRILLATOR) PRESENT: Status: ACTIVE | Noted: 2020-12-09

## 2021-08-04 PROCEDURE — 99213 OFFICE O/P EST LOW 20 MIN: CPT | Performed by: NURSE PRACTITIONER

## 2021-08-04 RX ORDER — LEVOTHYROXINE SODIUM 88 UG/1
88 TABLET ORAL
Qty: 90 TABLET | Refills: 1 | Status: SHIPPED | OUTPATIENT
Start: 2021-08-04 | End: 2021-10-21

## 2021-08-04 RX ORDER — CICLOPIROX 80 MG/ML
SOLUTION TOPICAL
COMMUNITY
Start: 2021-06-29 | End: 2021-10-11

## 2021-08-04 RX ORDER — CICLOPIROX 80 MG/ML
SOLUTION TOPICAL
COMMUNITY
Start: 2021-06-29 | End: 2021-08-04

## 2021-08-04 RX ORDER — DIGOXIN 125 MCG
TABLET ORAL
COMMUNITY
Start: 2021-07-19 | End: 2021-08-04

## 2021-08-04 RX ORDER — AMMONIUM LACTATE 12 G/100G
LOTION TOPICAL
COMMUNITY
Start: 2021-06-04 | End: 2021-08-04

## 2021-08-04 RX ORDER — AMIODARONE HYDROCHLORIDE 200 MG/1
TABLET ORAL
COMMUNITY
Start: 2021-06-28 | End: 2021-08-04

## 2021-08-04 RX ORDER — MAGNESIUM OXIDE 400 MG/1
TABLET ORAL
COMMUNITY
Start: 2021-06-24 | End: 2021-08-04

## 2021-08-04 RX ORDER — LEVOTHYROXINE SODIUM 0.07 MG/1
TABLET ORAL
COMMUNITY
Start: 2021-06-04 | End: 2021-08-04

## 2021-08-04 ASSESSMENT — FIBROSIS 4 INDEX: FIB4 SCORE: 2.26

## 2021-08-04 NOTE — ASSESSMENT & PLAN NOTE
Patient is followed by [facility], Dr. Read.  Pacemaker was reset yesterday on 8/3/2021 with the parameters of heart rate 135.  The patient denies CP, dyspnea, tachycardia.  Patient was recently seen in [facility] ED on 7/29/2021 for symptomatic tachycardia (HR up to 35 and EKG).  Metoprolol dose was adjusted from 25 mg twice daily to 37.5 mg twice daily.  BP today is 120/64 with a pulse of 61.

## 2021-08-04 NOTE — PROGRESS NOTES
Chief Complaint   Patient presents with   • Lab Results     Thyroid       HISTORY OF PRESENT ILLNESS: Patient is a 71 y.o. male, established patient who presents today to discuss medical problems as listed below:    Health Maintenance:  COMPLETED     Hypothyroidism  Results for KALIE SANDERS III (MRN [id number]) as of 8/4/2021 10:15   Ref. Range 7/31/2018 07:31 9/20/2018 10:56 10/18/2018 12:48 10/18/2018 12:50 10/18/2018 13:44 3/13/2019 10:03 11/11/2019 08:15 11/11/2019 08:57 11/5/2020 10:19 11/5/2020 10:20 11/5/2020 10:25 2/22/2021 10:16 6/3/2021 07:22 8/3/2021 07:43   TSH Latest Ref Range: 0.380 - 5.330 uIU/mL <0.005 (L) <0.005 (L)    12.430 (H) 9.840 (H)    15.200 (H) 13.600 (H) 11.400 (H) 6.030 (H)   Free T-4 Latest Ref Range: 0.93 - 1.70 ng/dL 5.06 (H) 3.61 (H)    0.77 0.82    1.28 1.23 1.41 1.93 (H)   Significant decrease in TSH with the current level of 6.03.  Currently on levothyroxine 75 mcg.  Still admits to daily fatigue.    Pacemaker  Patient is followed by [facility], Dr. Read.  Pacemaker was reset yesterday on 8/3/2021 with the parameters of heart rate 135.  The patient denies CP, dyspnea, tachycardia.  Patient was recently seen in [facility] ED on 7/29/2021 for symptomatic tachycardia (HR up to 35 and EKG).  Metoprolol dose was adjusted from 25 mg twice daily to 37.5 mg twice daily.  BP today is 120/64 with a pulse of 61.      Patient Active Problem List    Diagnosis Date Noted   • Peripheral cyanosis 06/04/2021   • Abdominal aortic aneurysm (AAA) without rupture (HCC) 04/02/2021   • Chronic anticoagulation 04/02/2021   • Hx of CABG 04/02/2021   • Coronary artery disease involving native coronary artery of native heart without angina pectoris 04/02/2021   • Chronic bronchitis (HCC) 03/10/2021   • Decreased hearing of both ears 03/10/2021   • AICD (automatic cardioverter/defibrillator) present 12/09/2020   • Ventricular tachycardia (HCC) 12/09/2020   • Paroxysmal atrial fibrillation (HCC)  12/09/2020   • Hypothyroidism 11/27/2019   • Vitamin D deficiency 11/27/2019   • Elevated hemoglobin A1c 11/08/2019   • Subacute thyroiditis 09/21/2018   • Emphysema of lung (HCC) 09/12/2018   • Lung nodules 09/12/2018   • Aortic aneurysm ([facility]) 09/12/2018   • Cardiomyopathy ([facility]) 08/08/2018   • Essential hypertension 07/26/2018   • History of MI (myocardial infarction) 07/26/2018   • Left elbow pain 07/26/2018   • Cigarette nicotine dependence without complication 07/26/2018   • Cough 07/26/2018   • Pacemaker 07/26/2018   • Chronic atrial fibrillation (HCC) 07/26/2018   • Mixed hyperlipidemia 07/26/2018        Allergies: Patient has no known allergies.    Current Outpatient Medications   Medication Sig Dispense Refill   • ciclopirox (PENLAC) 8 % solution APPLY TO AFFECTED TOE NAILS ONCE DAILY AND REMOVE AT THE END OF THE WEEK WITH NAIL POLISH REMOVER     • levothyroxine (SYNTHROID) 88 MCG Tab Take 1 tablet by mouth every morning on an empty stomach. 90 tablet 1   • nitroglycerin (NITROSTAT) 0.4 MG SL Tab 0.4 mg.     • pravastatin (PRAVACHOL) 80 MG tablet Take 80 mg by mouth every day.     • lisinopril (PRINIVIL) 5 MG Tab Take 5 mg by mouth every day.     • metoprolol (LOPRESSOR) 25 MG Tab Take 37.5 mg by mouth 2 times a day.     • amiodarone (CORDARONE) 200 MG Tab Take 200 mg by mouth 2 Times a Day.  0   • digoxin (LANOXIN) 125 MCG Tab Take 125 mcg by mouth every day. For 90 days     • albuterol 108 (90 Base) MCG/ACT Aero Soln inhalation aerosol Inhale 2 Puffs by mouth every 6 hours as needed for Shortness of Breath. 1 Inhaler 1   • apixaban (ELIQUIS) 5mg Tab Take 5 mg by mouth 2 Times a Day.     • aspirin EC (ECOTRIN) 81 MG Tablet Delayed Response Take 81 mg by mouth every day.     • magnesium oxide (MAG-OX) 400 MG Tab Take 400 mg by mouth every day.       No current facility-administered medications for this visit.       Social History     Tobacco Use   • Smoking status: Current Every Day Smoker     Packs/day:  "0.50     Years: 45.00     Pack years: 22.50     Types: Cigarettes   • Smokeless tobacco: Never Used   • Tobacco comment: since age 11 with a ~10-12 year break; he has cut down most recently to 4-5 day   Vaping Use   • Vaping Use: Never used   Substance Use Topics   • Alcohol use: Yes     Alcohol/week: 0.6 oz     Types: 1 Cans of beer per week     Comment: monthly   • Drug use: No     Social History     Social History Narrative   • Not on file       Family History   Problem Relation Age of Onset   • Heart Attack Mother    • Heart Disease Mother    • Hypertension Mother    • Cancer Father    • Heart Attack Father         s/p CABG   • Heart Disease Father         AAA   • Hypertension Father    • Heart Attack Paternal Grandmother    • Heart Attack Paternal Grandfather        Allergies, past medical history, past surgical history, family history, social history reviewed and updated.    Review of Systems:     - Constitutional: fatigue. Negative for fever, chills, unexpected weight change, /generalized weakness.     - Respiratory: Negative for cough, sputum production, chest congestion, dyspnea, wheezing, and crackles.      - Cardiovascular: Negative for chest pain, palpitations, orthopnea, and bilateral lower extremity edema.     - Psychiatric/Behavioral: Negative for depression, suicidal/homicidal ideation and memory loss.      All other systems reviewed and are negative    Exam:    /64   Pulse 61   Temp 36.3 °C (97.4 °F) (Temporal)   Resp 12   Ht 1.803 m (5' 11\")   Wt 88.2 kg (194 lb 6.4 oz)   SpO2 94%   BMI 27.11 kg/m²  Body mass index is 27.11 kg/m².    Physical Exam:  Constitutional: Well-developed and well-nourished. Not diaphoretic. No distress.   Cardiovascular: pacemaker in place. Regular rate and rhythm, S1 and S2 without murmur, rubs, or gallops.    Chest: Effort normal. Clear to auscultation throughout. No adventitious sounds. Neurological: Alert and oriented x 3.   Psychiatric:  Behavior, mood, " and affect are appropriate.  MA/nursing note and vitals reviewed.    LABS: 2021  results reviewed and discussed with the patient, questions answered.    My total time spent caring for the patient on the day of the encounter was 15 minutes.   This does not include time spent on separately billable procedures/tests.     Assessment/Plan:  1. Hypothyroidism, unspecified type  Uncontrolled, improving.  Will increase levothyroxine to 88 mcg.  We will follow-up in 2 months.  - levothyroxine (SYNTHROID) 88 MCG Tab; Take 1 tablet by mouth every morning on an empty stomach.  Dispense: 90 tablet; Refill: 1  - TSH; Future  - FREE THYROXINE; Future    2. Essential hypertension  Stable on current regimen.  Followed by [facility], Dr. Read  - Comp Metabolic Panel; Future  - Lipid Profile; Future    3. Pacemaker  Followed by cardiology, Dr. Read    4. Chronic atrial fibrillation (HCC)  Stable on current regimen.  Followed by cardiology, Dr. Read       Discussed with patient possible alternative diagnoses, pt is to take all medications as prescribed. If symptoms persist FU w/PCP, if symptoms worsen go to emergency room. If experiencing any side effects from prescribed medications reports to the office immediately or go to emergency room.  Reviewed indication, dosage, usage and potential adverse effects of prescribed medications. Reviewed risks and benefits of treatment plan. Patient verbalizes understanding of all instruction and verbally agrees to plan.    No follow-ups on file. 3 mos

## 2021-08-04 NOTE — ASSESSMENT & PLAN NOTE
Results for KALIE SANDERS III (MRN [id number]) as of 8/4/2021 10:15   Ref. Range 7/31/2018 07:31 9/20/2018 10:56 10/18/2018 12:48 10/18/2018 12:50 10/18/2018 13:44 3/13/2019 10:03 11/11/2019 08:15 11/11/2019 08:57 11/5/2020 10:19 11/5/2020 10:20 11/5/2020 10:25 2/22/2021 10:16 6/3/2021 07:22 8/3/2021 07:43   TSH Latest Ref Range: 0.380 - 5.330 uIU/mL <0.005 (L) <0.005 (L)    12.430 (H) 9.840 (H)    15.200 (H) 13.600 (H) 11.400 (H) 6.030 (H)   Free T-4 Latest Ref Range: 0.93 - 1.70 ng/dL 5.06 (H) 3.61 (H)    0.77 0.82    1.28 1.23 1.41 1.93 (H)   Significant decrease in TSH with the current level of 6.03.  Currently on levothyroxine 75 mcg.  Still admits to daily fatigue.

## 2021-08-31 ENCOUNTER — APPOINTMENT (OUTPATIENT)
Dept: MEDICAL GROUP | Facility: PHYSICIAN GROUP | Age: 71
End: 2021-08-31
Payer: MEDICARE

## 2021-09-20 ENCOUNTER — TELEPHONE (OUTPATIENT)
Dept: CARDIOLOGY | Facility: MEDICAL CENTER | Age: 71
End: 2021-09-20

## 2021-10-11 ENCOUNTER — TELEPHONE (OUTPATIENT)
Dept: CARDIOLOGY | Facility: MEDICAL CENTER | Age: 71
End: 2021-10-11

## 2021-10-11 ENCOUNTER — OFFICE VISIT (OUTPATIENT)
Dept: CARDIOLOGY | Facility: MEDICAL CENTER | Age: 71
End: 2021-10-11
Payer: MEDICARE

## 2021-10-11 VITALS
OXYGEN SATURATION: 95 % | HEIGHT: 71 IN | WEIGHT: 199.2 LBS | DIASTOLIC BLOOD PRESSURE: 78 MMHG | HEART RATE: 63 BPM | RESPIRATION RATE: 14 BRPM | BODY MASS INDEX: 27.89 KG/M2 | SYSTOLIC BLOOD PRESSURE: 120 MMHG

## 2021-10-11 DIAGNOSIS — I47.20 VENTRICULAR TACHYCARDIA (HCC): ICD-10-CM

## 2021-10-11 DIAGNOSIS — I48.20 CHRONIC ATRIAL FIBRILLATION (HCC): ICD-10-CM

## 2021-10-11 LAB — EKG IMPRESSION: NORMAL

## 2021-10-11 PROCEDURE — 93000 ELECTROCARDIOGRAM COMPLETE: CPT | Mod: 59 | Performed by: INTERNAL MEDICINE

## 2021-10-11 PROCEDURE — 93261 INTERROGATE SUBQ DEFIB: CPT | Mod: 26 | Performed by: INTERNAL MEDICINE

## 2021-10-11 PROCEDURE — 99205 OFFICE O/P NEW HI 60 MIN: CPT | Mod: 25 | Performed by: INTERNAL MEDICINE

## 2021-10-11 ASSESSMENT — FIBROSIS 4 INDEX: FIB4 SCORE: 2.26

## 2021-10-11 NOTE — TELEPHONE ENCOUNTER
----- Message from Gabe Campbell M.D. sent at 10/11/2021  3:08 PM PDT -----  Please schedule VT ablation, hold Eliquis 3 days, hold amiodarone 3 days, thanks

## 2021-10-11 NOTE — PROGRESS NOTES
Arrhythmia Clinic Note (New patient)     DOS: 10/11/2021    Referring physician: Dr Woodward    Chief complaint/Reason for consult: VT    HPI: 72 yo M with prior history of CAD, CABG, PCI, ICM EF 40%, ICD in situ, VT s/p ICD discharge x3, recently found to have slow VT in the 130s on ICD, on amiodarone. Referred to discuss ablation. Initially saw Dr Woodward at [facility] who referred to [place] for ablation but pt preferred to stay locally. Dr Read referred to [facility]. Pt symptomatic with VT but no recent ICD shocks.    ROS (+ highlighted in bold):  Constitutional: Fevers/chills/fatigue/weightloss  HEENT: Blurry vision/eye pain/sore throat/hearing loss  Respiratory: Shortness of breath/cough  Cardiovascular: Chest pain/palpitations/edema/orthopnea/syncope  GI: Nausea/vomitting/diarrhea  MSK: Arthralgias/myagias/muscle weakness  Skin: Rash/sores  Neurological: Numbness/tremors/vertigo  Endocrine: Excessive thirst/polyuria/cold intolerance/heat intolerance  Psych: Depression/anxiety    Past Medical History:   Diagnosis Date   • Anesthesia     ponv   • Arthritis     shoulder   • Atrial fibrillation (HCC)    • Chickenpox    • Dental disorder     Full set dentures   • Disorder of thyroid     Hypothyroid   • Romanian measles    • Hemorrhagic disorder (HCC)     On Blood thinners   • High cholesterol    • Hypertension    • Influenza    • Mumps    • Myocardial infarct (HCC) 87, ?89, 2007    x 3   • Other and unspecified angina pectoris 10/2016    Defibulator   • Pacemaker 10/20/2016    Pacemaker/Defib   • Pain     shoulder       Past Surgical History:   Procedure Laterality Date   • COLONOSCOPY  10/18/2018    Procedure: COLONOSCOPY - W/POSS BX, DILATION, POLYPECTOMY, CONTROL OF HEMORRHAGE;  Surgeon: Pineda Horvath M.D.;  Location: SURGERY [facility];  Service: EUS   • PACEMAKER INSERTION  10/20/2016   • INGUINAL HERNIA REPAIR BILATERAL  3/2/2015    Performed by Jack Tobias M.D. at SURGERY SAME DAY [facility]   • COLONOSCOPY   "2008   • MULTIPLE CORONARY ARTERY BYPASS  1991    stents (X 3 placement= stents total)   • APPENDECTOMY CHILD     • OTHER ORTHOPEDIC SURGERY      knee arthoscopy, bilaterally (\"early 80's\")   • TONSILLECTOMY  as a child       Social History     Socioeconomic History   • Marital status:      Spouse name: Not on file   • Number of children: Not on file   • Years of education: Not on file   • Highest education level: Not on file   Occupational History   • Not on file   Tobacco Use   • Smoking status: Current Every Day Smoker     Packs/day: 0.50     Years: 45.00     Pack years: 22.50     Types: Cigarettes   • Smokeless tobacco: Never Used   • Tobacco comment: since age 11 with a ~10-12 year break; he has cut down most recently to 4-5 day   Vaping Use   • Vaping Use: Never used   Substance and Sexual Activity   • Alcohol use: Yes     Alcohol/week: 0.6 oz     Types: 1 Cans of beer per week     Comment: monthly   • Drug use: No   • Sexual activity: Not Currently   Other Topics Concern   • Not on file   Social History Narrative   • Not on file     Social Determinants of Health     Financial Resource Strain:    • Difficulty of Paying Living Expenses:    Food Insecurity:    • Worried About Running Out of Food in the Last Year:    • Ran Out of Food in the Last Year:    Transportation Needs:    • Lack of Transportation (Medical):    • Lack of Transportation (Non-Medical):    Physical Activity:    • Days of Exercise per Week:    • Minutes of Exercise per Session:    Stress:    • Feeling of Stress :    Social Connections:    • Frequency of Communication with Friends and Family:    • Frequency of Social Gatherings with Friends and Family:    • Attends Taoism Services:    • Active Member of Clubs or Organizations:    • Attends Club or Organization Meetings:    • Marital Status:    Intimate Partner Violence:    • Fear of Current or Ex-Partner:    • Emotionally Abused:    • Physically Abused:    • Sexually Abused:  " "      Family History   Problem Relation Age of Onset   • Heart Attack Mother    • Heart Disease Mother    • Hypertension Mother    • Cancer Father    • Heart Attack Father         s/p CABG   • Heart Disease Father         AAA   • Hypertension Father    • Heart Attack Paternal Grandmother    • Heart Attack Paternal Grandfather        No Known Allergies    Current Outpatient Medications   Medication Sig Dispense Refill   • levothyroxine (SYNTHROID) 88 MCG Tab Take 1 tablet by mouth every morning on an empty stomach. 90 tablet 1   • nitroglycerin (NITROSTAT) 0.4 MG SL Tab 0.4 mg.     • pravastatin (PRAVACHOL) 80 MG tablet Take 80 mg by mouth every day.     • lisinopril (PRINIVIL) 5 MG Tab Take 5 mg by mouth every day.     • metoprolol (LOPRESSOR) 25 MG Tab Take 50 mg by mouth 2 times a day.     • amiodarone (CORDARONE) 200 MG Tab Take 200 mg by mouth every day.  0   • digoxin (LANOXIN) 125 MCG Tab Take 125 mcg by mouth every day. For 90 days     • albuterol 108 (90 Base) MCG/ACT Aero Soln inhalation aerosol Inhale 2 Puffs by mouth every 6 hours as needed for Shortness of Breath. 1 Inhaler 1   • apixaban (ELIQUIS) 5mg Tab Take 5 mg by mouth 2 Times a Day.     • aspirin EC (ECOTRIN) 81 MG Tablet Delayed Response Take 81 mg by mouth every day.     • magnesium oxide (MAG-OX) 400 MG Tab Take 400 mg by mouth every day.     • ciclopirox (PENLAC) 8 % solution APPLY TO AFFECTED TOE NAILS ONCE DAILY AND REMOVE AT THE END OF THE WEEK WITH NAIL POLISH REMOVER (Patient not taking: Reported on 10/11/2021)       No current facility-administered medications for this visit.       Physical Exam:  Vitals:    10/11/21 1413   BP: 120/78   BP Location: Left arm   Patient Position: Sitting   BP Cuff Size: Adult   Pulse: 63   Resp: 14   SpO2: 95%   Weight: 90.4 kg (199 lb 3.2 oz)   Height: 1.803 m (5' 11\")     General appearance: NAD, conversant   Eyes: anicteric sclerae, moist conjunctivae; no lid-lag; PERRLA  HENT: Atraumatic; oropharynx " clear with moist mucous membranes and no mucosal ulcerations; normal hard and soft palate  Neck: Trachea midline; FROM, supple, no thyromegaly or lymphadenopathy  Lungs: CTA, with normal respiratory effort and no intercostal retractions  CV: RRR, no MRGs, no JVD   Abdomen: Soft, non-tender; no masses or HSM  Extremities: No peripheral edema or extremity lymphadenopathy  Skin: Normal temperature, turgor and texture; no rash, ulcers or subcutaneous nodules  Psych: Appropriate affect, alert and oriented to person, place and time    Data:  Lipids:   Lab Results   Component Value Date/Time    CHOLSTRLTOT 186 11/05/2020 10:19 AM    TRIGLYCERIDE 118 11/05/2020 10:19 AM    HDL 53 11/05/2020 10:19 AM     (H) 11/05/2020 10:19 AM        BMP:  Lab Results   Component Value Date/Time    SODIUM 139 11/05/2020 1019    POTASSIUM 4.5 11/05/2020 1019    CHLORIDE 102 11/05/2020 1019    CO2 25 11/05/2020 1019    GLUCOSE 85 11/05/2020 1019    BUN 15 11/05/2020 1019    CREATININE 0.98 11/05/2020 1019    CALCIUM 9.4 11/05/2020 1019    ANION 12.0 11/05/2020 1019        TSH:   Lab Results   Component Value Date/Time    TSHULTRASEN 6.030 (H) 08/03/2021 0743        THYROXINE (T4):   No results found for: EMEKAIR     CBC:   Lab Results   Component Value Date/Time    WBC 8.6 11/05/2020 10:19 AM    RBC 4.23 (L) 11/05/2020 10:19 AM    HEMOGLOBIN 15.1 11/05/2020 10:19 AM    HEMATOCRIT 45.9 11/05/2020 10:19 AM    .5 (H) 11/05/2020 10:19 AM    MCH 35.7 (H) 11/05/2020 10:19 AM    MCHC 32.9 (L) 11/05/2020 10:19 AM    RDW 51.7 (H) 11/05/2020 10:19 AM    PLATELETCT 165 11/05/2020 10:19 AM    MPV 12.4 11/05/2020 10:19 AM    NEUTSPOLYS 64.40 11/05/2020 10:19 AM    LYMPHOCYTES 25.90 11/05/2020 10:19 AM    MONOCYTES 7.50 11/05/2020 10:19 AM    EOSINOPHILS 1.50 11/05/2020 10:19 AM    BASOPHILS 0.50 11/05/2020 10:19 AM    IMMGRAN 0.20 11/05/2020 10:19 AM    NRBC 0.00 11/05/2020 10:19 AM    NEUTS 5.56 11/05/2020 10:19 AM    LYMPHS 2.24 11/05/2020  10:19 AM    MONOS 0.65 11/05/2020 10:19 AM    EOS 0.13 11/05/2020 10:19 AM    BASO 0.04 11/05/2020 10:19 AM    IMMGRANAB 0.02 11/05/2020 10:19 AM    NRBCAB 0.00 11/05/2020 10:19 AM        CBC w/o DIFF  Lab Results   Component Value Date/Time    WBC 8.6 11/05/2020 10:19 AM    RBC 4.23 (L) 11/05/2020 10:19 AM    HEMOGLOBIN 15.1 11/05/2020 10:19 AM    .5 (H) 11/05/2020 10:19 AM    MCH 35.7 (H) 11/05/2020 10:19 AM    MCHC 32.9 (L) 11/05/2020 10:19 AM    RDW 51.7 (H) 11/05/2020 10:19 AM    MPV 12.4 11/05/2020 10:19 AM       Prior echo/stress results reviewed: EF 40% per [facility]    EKG interpreted by me: Atrial paced rhythm    Impression/Plan:  1. pAF  2. VT  3. CAD s/p CABG  4. ICM EF 40%    - Discussed VT ablation. Risks include vascular access bleeding or pain, infection, stroke, myocardial infarction, cardiac tamponade, pericardial effusion, myocardial perforation, major bleeding, phrenic nerve damage, heart block requiring a pacemaker, and death. Risk of major adverse event is ~1%. Success rates long term are 50-80 % depending on the arrhythmia induced and the acute success in the EP lab. Pt would like to proceed.  - pAF is low burden, may be due to R wave oversensing, he is on Eliquis, would hold Eliquis 3 days prior to ablation.     Plan VT ablation next available      Gabe Campbell MD  Cardiac Electrophysiology

## 2021-10-11 NOTE — TELEPHONE ENCOUNTER
Patient scheduled for VT ablation on 11-15-21 with Dr. Campbell. Patient has been instructed to check in at 11:00 for 1:00 case time. Hold Amiodarone and Eliquis 3 days before. Message sent to authorcurt. Kajal with [facility] notified. Emailed Yvette.

## 2021-10-12 ENCOUNTER — PATIENT MESSAGE (OUTPATIENT)
Dept: HEALTH INFORMATION MANAGEMENT | Facility: OTHER | Age: 71
End: 2021-10-12

## 2021-10-12 NOTE — TELEPHONE ENCOUNTER
Recvd call from patient - he would like to reschedule this procedure to a different day. He is now scheduled for 11-22-21 with Dr. Campbell. He has been instructed to check in at 10:30 for 12:30 case time. Hold Amiodarone and Eliquis 3 days. Message sent to authorizations. Emailed Ramsey Rdz to call me back for case information.

## 2021-10-20 ENCOUNTER — HOSPITAL ENCOUNTER (OUTPATIENT)
Dept: LAB | Facility: MEDICAL CENTER | Age: 71
End: 2021-10-20
Attending: NURSE PRACTITIONER
Payer: MEDICARE

## 2021-10-20 DIAGNOSIS — I10 ESSENTIAL HYPERTENSION: ICD-10-CM

## 2021-10-20 DIAGNOSIS — E03.9 HYPOTHYROIDISM, UNSPECIFIED TYPE: ICD-10-CM

## 2021-10-20 LAB
ALBUMIN SERPL BCP-MCNC: 4.1 G/DL (ref 3.2–4.9)
ALBUMIN/GLOB SERPL: 1.6 G/DL
ALP SERPL-CCNC: 75 U/L (ref 30–99)
ALT SERPL-CCNC: 7 U/L (ref 2–50)
ANION GAP SERPL CALC-SCNC: 11 MMOL/L (ref 7–16)
AST SERPL-CCNC: 15 U/L (ref 12–45)
BILIRUB SERPL-MCNC: 0.5 MG/DL (ref 0.1–1.5)
BUN SERPL-MCNC: 17 MG/DL (ref 8–22)
CALCIUM SERPL-MCNC: 9.5 MG/DL (ref 8.5–10.5)
CHLORIDE SERPL-SCNC: 104 MMOL/L (ref 96–112)
CHOLEST SERPL-MCNC: 204 MG/DL (ref 100–199)
CO2 SERPL-SCNC: 24 MMOL/L (ref 20–33)
CREAT SERPL-MCNC: 1.04 MG/DL (ref 0.5–1.4)
FASTING STATUS PATIENT QL REPORTED: NORMAL
GLOBULIN SER CALC-MCNC: 2.5 G/DL (ref 1.9–3.5)
GLUCOSE SERPL-MCNC: 96 MG/DL (ref 65–99)
HDLC SERPL-MCNC: 33 MG/DL
LDLC SERPL CALC-MCNC: 107 MG/DL
POTASSIUM SERPL-SCNC: 4.8 MMOL/L (ref 3.6–5.5)
PROT SERPL-MCNC: 6.6 G/DL (ref 6–8.2)
SODIUM SERPL-SCNC: 139 MMOL/L (ref 135–145)
T4 FREE SERPL-MCNC: 1.77 NG/DL (ref 0.93–1.7)
TRIGL SERPL-MCNC: 319 MG/DL (ref 0–149)
TSH SERPL DL<=0.005 MIU/L-ACNC: 7.04 UIU/ML (ref 0.38–5.33)

## 2021-10-20 PROCEDURE — 84443 ASSAY THYROID STIM HORMONE: CPT

## 2021-10-20 PROCEDURE — 84439 ASSAY OF FREE THYROXINE: CPT

## 2021-10-20 PROCEDURE — 80053 COMPREHEN METABOLIC PANEL: CPT

## 2021-10-20 PROCEDURE — 36415 COLL VENOUS BLD VENIPUNCTURE: CPT

## 2021-10-20 PROCEDURE — 80061 LIPID PANEL: CPT

## 2021-10-21 ENCOUNTER — OFFICE VISIT (OUTPATIENT)
Dept: MEDICAL GROUP | Facility: PHYSICIAN GROUP | Age: 71
End: 2021-10-21
Payer: MEDICARE

## 2021-10-21 VITALS
SYSTOLIC BLOOD PRESSURE: 120 MMHG | TEMPERATURE: 97.1 F | HEIGHT: 71 IN | OXYGEN SATURATION: 97 % | HEART RATE: 79 BPM | DIASTOLIC BLOOD PRESSURE: 82 MMHG | RESPIRATION RATE: 16 BRPM | BODY MASS INDEX: 27.97 KG/M2 | WEIGHT: 199.8 LBS

## 2021-10-21 DIAGNOSIS — E78.2 MIXED HYPERLIPIDEMIA: ICD-10-CM

## 2021-10-21 DIAGNOSIS — Z23 NEED FOR VACCINATION: ICD-10-CM

## 2021-10-21 DIAGNOSIS — F17.200 CURRENT EVERY DAY SMOKER: ICD-10-CM

## 2021-10-21 DIAGNOSIS — E03.9 HYPOTHYROIDISM, UNSPECIFIED TYPE: ICD-10-CM

## 2021-10-21 PROCEDURE — 90662 IIV NO PRSV INCREASED AG IM: CPT | Performed by: NURSE PRACTITIONER

## 2021-10-21 PROCEDURE — G0008 ADMIN INFLUENZA VIRUS VAC: HCPCS | Performed by: NURSE PRACTITIONER

## 2021-10-21 PROCEDURE — 99406 BEHAV CHNG SMOKING 3-10 MIN: CPT | Performed by: NURSE PRACTITIONER

## 2021-10-21 PROCEDURE — 99214 OFFICE O/P EST MOD 30 MIN: CPT | Mod: 25 | Performed by: NURSE PRACTITIONER

## 2021-10-21 RX ORDER — LEVOTHYROXINE SODIUM 0.1 MG/1
100 TABLET ORAL
Qty: 90 TABLET | Refills: 1 | Status: SHIPPED | OUTPATIENT
Start: 2021-10-21 | End: 2022-01-12

## 2021-10-21 RX ORDER — PRAVASTATIN SODIUM 80 MG/1
80 TABLET ORAL DAILY
Qty: 90 TABLET | Refills: 3 | Status: SHIPPED | OUTPATIENT
Start: 2021-10-21 | End: 2022-04-29 | Stop reason: SDUPTHER

## 2021-10-21 ASSESSMENT — FIBROSIS 4 INDEX: FIB4 SCORE: 2.41

## 2021-10-21 NOTE — ASSESSMENT & PLAN NOTE
Chronic problem.  50 pack years, weaning off on his own.  Currently down to 8 cigarettes daily.  He is not interested in prescription intervention or smoking cessation program.  Patient counseled for over 3 mins, not exceeding 10mins

## 2021-10-21 NOTE — ASSESSMENT & PLAN NOTE
Results for KALIE SANDERS III (MRN [id number]) as of 10/21/2021 10:28   Ref. Range 10/20/2021 07:36   Cholesterol,Tot Latest Ref Range: 100 - 199 mg/dL 204 (H)   Triglycerides Latest Ref Range: 0 - 149 mg/dL 319 (H)   HDL Latest Ref Range: >=40 mg/dL 33 (A)   LDL Latest Ref Range: <100 mg/dL 107 (H)   Patient drinks regular Coke daily.  On pravastatin 80 mg, tolerating well. He is followed by cardiology Dr. Read  50 pack years.  ASCVD 26%  Pt to schedule appt with his cardiology.

## 2021-10-21 NOTE — PROGRESS NOTES
Chief Complaint   Patient presents with   • Lab Results       HISTORY OF PRESENT ILLNESS: Patient is a 71 y.o. male, established patient who presents today to discuss medical problems as listed below:    Health Maintenance:  COMPLETED     Mixed hyperlipidemia  Results for KALIE SANDERS III (MRN [id number]) as of 10/21/2021 10:28   Ref. Range 10/20/2021 07:36   Cholesterol,Tot Latest Ref Range: 100 - 199 mg/dL 204 (H)   Triglycerides Latest Ref Range: 0 - 149 mg/dL 319 (H)   HDL Latest Ref Range: >=40 mg/dL 33 (A)   LDL Latest Ref Range: <100 mg/dL 107 (H)   Patient drinks regular Coke daily.  On pravastatin 80 mg, tolerating well. He is followed by cardiology Dr. Read  50 pack years.  ASCVD 26%  Pt to schedule appt with his cardiology.    Hypothyroidism  Results for KALIE SANDERS III (MRN [id number]) as of 10/21/2021 10:28   Ref. Range 8/3/2021 07:43 10/11/2021 15:24 10/20/2021 07:36   TSH Latest Ref Range: 0.380 - 5.330 uIU/mL 6.030 (H)  7.040 (H)   Free T-4 Latest Ref Range: 0.93 - 1.70 ng/dL 1.93 (H)  1.77 (H)   On Levothyroxine 88 mcg daily.     Current every day smoker  Chronic problem.  50 pack years, weaning off on his own.  Currently down to 8 cigarettes daily.  He is not interested in prescription intervention or smoking cessation program.  Patient counseled for over 3 mins, not exceeding 10mins      Patient Active Problem List    Diagnosis Date Noted   • Current every day smoker 10/21/2021   • Peripheral cyanosis 06/04/2021   • Abdominal aortic aneurysm (AAA) without rupture (HCC) 04/02/2021   • Chronic anticoagulation 04/02/2021   • Hx of CABG 04/02/2021   • Coronary artery disease involving native coronary artery of native heart without angina pectoris 04/02/2021   • Chronic bronchitis (HCC) 03/10/2021   • Decreased hearing of both ears 03/10/2021   • AICD (automatic cardioverter/defibrillator) present 12/09/2020   • Ventricular tachycardia (HCC) 12/09/2020   • Paroxysmal atrial fibrillation (HCC)  12/09/2020   • Hypothyroidism 11/27/2019   • Vitamin D deficiency 11/27/2019   • Elevated hemoglobin A1c 11/08/2019   • Subacute thyroiditis 09/21/2018   • Emphysema of lung ([facility]) 09/12/2018   • Lung nodules 09/12/2018   • Aortic aneurysm ([facility]) 09/12/2018   • Cardiomyopathy ([facility]) 08/08/2018   • Essential hypertension 07/26/2018   • History of MI (myocardial infarction) 07/26/2018   • Left elbow pain 07/26/2018   • Cigarette nicotine dependence without complication 07/26/2018   • Cough 07/26/2018   • Pacemaker 07/26/2018   • Chronic atrial fibrillation (HCC) 07/26/2018   • Mixed hyperlipidemia 07/26/2018        Allergies: Patient has no known allergies.    Current Outpatient Medications   Medication Sig Dispense Refill   • pravastatin (PRAVACHOL) 80 MG tablet Take 1 Tablet by mouth every day. 90 Tablet 3   • levothyroxine (SYNTHROID) 100 MCG Tab Take 1 Tablet by mouth every morning on an empty stomach. 90 Tablet 1   • nitroglycerin (NITROSTAT) 0.4 MG SL Tab 0.4 mg.     • lisinopril (PRINIVIL) 5 MG Tab Take 5 mg by mouth every day.     • metoprolol (LOPRESSOR) 25 MG Tab Take 50 mg by mouth 2 times a day.     • amiodarone (CORDARONE) 200 MG Tab Take 200 mg by mouth every day.  0   • digoxin (LANOXIN) 125 MCG Tab Take 125 mcg by mouth every day. For 90 days     • albuterol 108 (90 Base) MCG/ACT Aero Soln inhalation aerosol Inhale 2 Puffs by mouth every 6 hours as needed for Shortness of Breath. 1 Inhaler 1   • apixaban (ELIQUIS) 5mg Tab Take 5 mg by mouth 2 Times a Day.     • aspirin EC (ECOTRIN) 81 MG Tablet Delayed Response Take 81 mg by mouth every day.     • magnesium oxide (MAG-OX) 400 MG Tab Take 400 mg by mouth every day.       No current facility-administered medications for this visit.       Social History     Tobacco Use   • Smoking status: Light Tobacco Smoker     Packs/day: 0.50     Years: 45.00     Pack years: 22.50     Types: Cigarettes   • Smokeless tobacco: Never Used   • Tobacco comment: since age 11  "with a ~10-12 year break; he has cut down most recently to 4-5 day   Vaping Use   • Vaping Use: Never used   Substance Use Topics   • Alcohol use: Yes     Alcohol/week: 0.6 oz     Types: 1 Cans of beer per week     Comment: monthly   • Drug use: No     Social History     Social History Narrative   • Not on file       Family History   Problem Relation Age of Onset   • Heart Attack Mother    • Heart Disease Mother    • Hypertension Mother    • Cancer Father    • Heart Attack Father         s/p CABG   • Heart Disease Father         AAA   • Hypertension Father    • Heart Attack Paternal Grandmother    • Heart Attack Paternal Grandfather        Allergies, past medical history, past surgical history, family history, social history reviewed and updated.    Review of Systems:     - Constitutional: Negative for fever, chills, unexpected weight change, and fatigue/generalized weakness.     - Respiratory: Negative for cough, sputum production, chest congestion, dyspnea, wheezing, and crackles.      - Cardiovascular: Negative for chest pain, palpitations, orthopnea, and bilateral lower extremity edema.    - Psychiatric/Behavioral: Negative for depression, suicidal/homicidal ideation and memory loss.      All other systems reviewed and are negative    Exam:    /82   Pulse 79   Temp 36.2 °C (97.1 °F) (Temporal)   Resp 16   Ht 1.803 m (5' 11\")   Wt 90.6 kg (199 lb 12.8 oz)   SpO2 97%   BMI 27.87 kg/m²  Body mass index is 27.87 kg/m².    Physical Exam:  Constitutional: Well-developed and well-nourished. Not diaphoretic. No distress.   Cardiovascular: Regular rate and rhythm, S1 and S2 without murmur, rubs, or gallops.    Chest: Effort normal. Clear to auscultation throughout. No adventitious sounds.   Neurological: Alert and oriented x 3.   Psychiatric:  Behavior, mood, and affect are appropriate.  MA/nursing note and vitals reviewed.    LABS: 10/21  results reviewed and discussed with the patient, questions " answered.    My total time spent caring for the patient on the day of the encounter was 25 minutes.   This does not include time spent on separately billable procedures/tests.     Assessment/Plan:  1. Need for vaccination  - Influenza Vaccine, High Dose (65+ Only)    2. Mixed hyperlipidemia  Uncontrolled, stable. Discussed etiology and potential risk factors. Discussed 10-year ASCVD risk. Educated on healthy lifestyle including nutrition and exercise.  Avoid excessive red meat and simple carbohydrates.  Increase fiber intake to 25-30 g daily if tolerated and take fish oil 2 g nightly.  Advised low saturated fat, low carbohydrate diet.  Quit smoking, decrease coca cola intake as this may be reflected in high Trigs. Maintain adequate hydration.  - pravastatin (PRAVACHOL) 80 MG tablet; Take 1 Tablet by mouth every day.  Dispense: 90 Tablet; Refill: 3  - Lipid Profile; Future    3. Hypothyroidism, unspecified type  Uncontrolled, stable. Will increase Levothyroxine to 100 mcg from 88  - levothyroxine (SYNTHROID) 100 MCG Tab; Take 1 Tablet by mouth every morning on an empty stomach.  Dispense: 90 Tablet; Refill: 1  - TSH; Future  - FREE THYROXINE; Future    4. Current every day smoker  Patient counseled for over 3 mins, not exceeding 10mins. He is weaning off on his own slowly. Declined Rx interventions at this time       Discussed with patient possible alternative diagnoses, pt is to take all medications as prescribed. If symptoms persist FU w/PCP, if symptoms worsen go to emergency room. If experiencing any side effects from prescribed medications reports to the office immediately or go to emergency room.  Reviewed indication, dosage, usage and potential adverse effects of prescribed medications. Reviewed risks and benefits of treatment plan. Patient verbalizes understanding of all instruction and verbally agrees to plan.    No follow-ups on file. 3-4 mos

## 2021-10-21 NOTE — ASSESSMENT & PLAN NOTE
Results for KALIE SANDERS ASTRID (MRN [id number]) as of 10/21/2021 10:28   Ref. Range 8/3/2021 07:43 10/11/2021 15:24 10/20/2021 07:36   TSH Latest Ref Range: 0.380 - 5.330 uIU/mL 6.030 (H)  7.040 (H)   Free T-4 Latest Ref Range: 0.93 - 1.70 ng/dL 1.93 (H)  1.77 (H)   On Levothyroxine 88 mcg daily.

## 2021-10-28 ENCOUNTER — DOCUMENTATION (OUTPATIENT)
Dept: VASCULAR LAB | Facility: MEDICAL CENTER | Age: 71
End: 2021-10-28

## 2021-10-28 DIAGNOSIS — I71.40 ABDOMINAL AORTIC ANEURYSM (AAA) WITHOUT RUPTURE (HCC): ICD-10-CM

## 2021-11-17 ENCOUNTER — PRE-ADMISSION TESTING (OUTPATIENT)
Dept: ADMISSIONS | Facility: MEDICAL CENTER | Age: 71
End: 2021-11-17
Attending: INTERNAL MEDICINE
Payer: MEDICARE

## 2021-11-17 DIAGNOSIS — Z01.812 PRE-OPERATIVE LABORATORY EXAMINATION: ICD-10-CM

## 2021-11-17 DIAGNOSIS — Z01.810 PRE-OPERATIVE CARDIOVASCULAR EXAMINATION: ICD-10-CM

## 2021-11-17 LAB
ALBUMIN SERPL BCP-MCNC: 4.3 G/DL (ref 3.2–4.9)
ALBUMIN/GLOB SERPL: 1.9 G/DL
ALP SERPL-CCNC: 85 U/L (ref 30–99)
ALT SERPL-CCNC: 6 U/L (ref 2–50)
ANION GAP SERPL CALC-SCNC: 12 MMOL/L (ref 7–16)
AST SERPL-CCNC: 13 U/L (ref 12–45)
BASOPHILS # BLD AUTO: 0.5 % (ref 0–1.8)
BASOPHILS # BLD: 0.03 K/UL (ref 0–0.12)
BILIRUB SERPL-MCNC: 0.5 MG/DL (ref 0.1–1.5)
BUN SERPL-MCNC: 16 MG/DL (ref 8–22)
CALCIUM SERPL-MCNC: 9.1 MG/DL (ref 8.5–10.5)
CHLORIDE SERPL-SCNC: 102 MMOL/L (ref 96–112)
CO2 SERPL-SCNC: 23 MMOL/L (ref 20–33)
CREAT SERPL-MCNC: 0.93 MG/DL (ref 0.5–1.4)
EKG IMPRESSION: NORMAL
EOSINOPHIL # BLD AUTO: 0.08 K/UL (ref 0–0.51)
EOSINOPHIL NFR BLD: 1.4 % (ref 0–6.9)
ERYTHROCYTE [DISTWIDTH] IN BLOOD BY AUTOMATED COUNT: 50.6 FL (ref 35.9–50)
GLOBULIN SER CALC-MCNC: 2.3 G/DL (ref 1.9–3.5)
GLUCOSE SERPL-MCNC: 91 MG/DL (ref 65–99)
HCT VFR BLD AUTO: 44.3 % (ref 42–52)
HGB BLD-MCNC: 15 G/DL (ref 14–18)
IMM GRANULOCYTES # BLD AUTO: 0.02 K/UL (ref 0–0.11)
IMM GRANULOCYTES NFR BLD AUTO: 0.4 % (ref 0–0.9)
INR PPP: 1.24 (ref 0.87–1.13)
LYMPHOCYTES # BLD AUTO: 1.37 K/UL (ref 1–4.8)
LYMPHOCYTES NFR BLD: 24.8 % (ref 22–41)
MCH RBC QN AUTO: 36.6 PG (ref 27–33)
MCHC RBC AUTO-ENTMCNC: 33.9 G/DL (ref 33.7–35.3)
MCV RBC AUTO: 108 FL (ref 81.4–97.8)
MONOCYTES # BLD AUTO: 0.62 K/UL (ref 0–0.85)
MONOCYTES NFR BLD AUTO: 11.2 % (ref 0–13.4)
NEUTROPHILS # BLD AUTO: 3.4 K/UL (ref 1.82–7.42)
NEUTROPHILS NFR BLD: 61.7 % (ref 44–72)
NRBC # BLD AUTO: 0.02 K/UL
NRBC BLD-RTO: 0.4 /100 WBC
PLATELET # BLD AUTO: 148 K/UL (ref 164–446)
PMV BLD AUTO: 12 FL (ref 9–12.9)
POTASSIUM SERPL-SCNC: 4.5 MMOL/L (ref 3.6–5.5)
PROT SERPL-MCNC: 6.6 G/DL (ref 6–8.2)
PROTHROMBIN TIME: 15.2 SEC (ref 12–14.6)
RBC # BLD AUTO: 4.1 M/UL (ref 4.7–6.1)
SODIUM SERPL-SCNC: 137 MMOL/L (ref 135–145)
WBC # BLD AUTO: 5.5 K/UL (ref 4.8–10.8)

## 2021-11-17 PROCEDURE — U0005 INFEC AGEN DETEC AMPLI PROBE: HCPCS

## 2021-11-17 PROCEDURE — 93010 ELECTROCARDIOGRAM REPORT: CPT | Performed by: INTERNAL MEDICINE

## 2021-11-17 PROCEDURE — 85025 COMPLETE CBC W/AUTO DIFF WBC: CPT

## 2021-11-17 PROCEDURE — 36415 COLL VENOUS BLD VENIPUNCTURE: CPT

## 2021-11-17 PROCEDURE — 85610 PROTHROMBIN TIME: CPT

## 2021-11-17 PROCEDURE — 80053 COMPREHEN METABOLIC PANEL: CPT

## 2021-11-17 PROCEDURE — U0003 INFECTIOUS AGENT DETECTION BY NUCLEIC ACID (DNA OR RNA); SEVERE ACUTE RESPIRATORY SYNDROME CORONAVIRUS 2 (SARS-COV-2) (CORONAVIRUS DISEASE [COVID-19]), AMPLIFIED PROBE TECHNIQUE, MAKING USE OF HIGH THROUGHPUT TECHNOLOGIES AS DESCRIBED BY CMS-2020-01-R: HCPCS

## 2021-11-17 PROCEDURE — 93005 ELECTROCARDIOGRAM TRACING: CPT

## 2021-11-17 PROCEDURE — C9803 HOPD COVID-19 SPEC COLLECT: HCPCS

## 2021-11-17 ASSESSMENT — FIBROSIS 4 INDEX: FIB4 SCORE: 2.41

## 2021-11-18 LAB
SARS-COV-2 RNA RESP QL NAA+PROBE: NOTDETECTED
SPECIMEN SOURCE: NORMAL

## 2021-11-22 ENCOUNTER — ANESTHESIA (OUTPATIENT)
Dept: CARDIOLOGY | Facility: MEDICAL CENTER | Age: 71
End: 2021-11-22
Payer: MEDICARE

## 2021-11-22 ENCOUNTER — ANESTHESIA EVENT (OUTPATIENT)
Dept: CARDIOLOGY | Facility: MEDICAL CENTER | Age: 71
End: 2021-11-22
Payer: MEDICARE

## 2021-11-22 ENCOUNTER — HOSPITAL ENCOUNTER (OUTPATIENT)
Facility: MEDICAL CENTER | Age: 71
End: 2021-11-23
Attending: INTERNAL MEDICINE | Admitting: INTERNAL MEDICINE
Payer: MEDICARE

## 2021-11-22 ENCOUNTER — APPOINTMENT (OUTPATIENT)
Dept: CARDIOLOGY | Facility: MEDICAL CENTER | Age: 71
End: 2021-11-22
Attending: INTERNAL MEDICINE
Payer: MEDICARE

## 2021-11-22 DIAGNOSIS — I47.20 VENTRICULAR TACHYCARDIA (HCC): ICD-10-CM

## 2021-11-22 LAB
ACT BLD: 235 SEC (ref 74–137)
ACT BLD: 324 SEC (ref 74–137)
EKG IMPRESSION: NORMAL

## 2021-11-22 PROCEDURE — 700111 HCHG RX REV CODE 636 W/ 250 OVERRIDE (IP): Performed by: ANESTHESIOLOGY

## 2021-11-22 PROCEDURE — 96374 THER/PROPH/DIAG INJ IV PUSH: CPT

## 2021-11-22 PROCEDURE — 93655 ICAR CATH ABLTJ DSCRT ARRHYT: CPT | Performed by: INTERNAL MEDICINE

## 2021-11-22 PROCEDURE — 85347 COAGULATION TIME ACTIVATED: CPT

## 2021-11-22 PROCEDURE — 93621 COMP EP EVL L PAC&REC C SINS: CPT

## 2021-11-22 PROCEDURE — 700101 HCHG RX REV CODE 250

## 2021-11-22 PROCEDURE — 93005 ELECTROCARDIOGRAM TRACING: CPT | Performed by: INTERNAL MEDICINE

## 2021-11-22 PROCEDURE — 160002 HCHG RECOVERY MINUTES (STAT)

## 2021-11-22 PROCEDURE — 93662 INTRACARDIAC ECG (ICE): CPT | Mod: 26 | Performed by: INTERNAL MEDICINE

## 2021-11-22 PROCEDURE — 700102 HCHG RX REV CODE 250 W/ 637 OVERRIDE(OP): Performed by: INTERNAL MEDICINE

## 2021-11-22 PROCEDURE — 700101 HCHG RX REV CODE 250: Performed by: ANESTHESIOLOGY

## 2021-11-22 PROCEDURE — 93623 PRGRMD STIMJ&PACG IV RX NFS: CPT | Mod: 26 | Performed by: INTERNAL MEDICINE

## 2021-11-22 PROCEDURE — C1894 INTRO/SHEATH, NON-LASER: HCPCS

## 2021-11-22 PROCEDURE — G0378 HOSPITAL OBSERVATION PER HR: HCPCS

## 2021-11-22 PROCEDURE — 700105 HCHG RX REV CODE 258: Performed by: ANESTHESIOLOGY

## 2021-11-22 PROCEDURE — 93655 ICAR CATH ABLTJ DSCRT ARRHYT: CPT

## 2021-11-22 PROCEDURE — 93654 COMPRE EP EVAL TX VT: CPT | Performed by: INTERNAL MEDICINE

## 2021-11-22 PROCEDURE — 93462 L HRT CATH TRNSPTL PUNCTURE: CPT

## 2021-11-22 PROCEDURE — 93621 COMP EP EVL L PAC&REC C SINS: CPT | Mod: 26 | Performed by: INTERNAL MEDICINE

## 2021-11-22 PROCEDURE — 93462 L HRT CATH TRNSPTL PUNCTURE: CPT | Performed by: INTERNAL MEDICINE

## 2021-11-22 PROCEDURE — 93010 ELECTROCARDIOGRAM REPORT: CPT | Performed by: INTERNAL MEDICINE

## 2021-11-22 PROCEDURE — 700111 HCHG RX REV CODE 636 W/ 250 OVERRIDE (IP)

## 2021-11-22 PROCEDURE — A9270 NON-COVERED ITEM OR SERVICE: HCPCS | Performed by: INTERNAL MEDICINE

## 2021-11-22 RX ORDER — LISINOPRIL 5 MG/1
5 TABLET ORAL DAILY
Status: DISCONTINUED | OUTPATIENT
Start: 2021-11-23 | End: 2021-11-23 | Stop reason: HOSPADM

## 2021-11-22 RX ORDER — HALOPERIDOL 5 MG/ML
1 INJECTION INTRAMUSCULAR
Status: DISCONTINUED | OUTPATIENT
Start: 2021-11-22 | End: 2021-11-22 | Stop reason: HOSPADM

## 2021-11-22 RX ORDER — LEVOTHYROXINE SODIUM 0.1 MG/1
100 TABLET ORAL
Status: DISCONTINUED | OUTPATIENT
Start: 2021-11-23 | End: 2021-11-23 | Stop reason: HOSPADM

## 2021-11-22 RX ORDER — LIDOCAINE HYDROCHLORIDE 40 MG/ML
SOLUTION TOPICAL PRN
Status: DISCONTINUED | OUTPATIENT
Start: 2021-11-22 | End: 2021-11-22 | Stop reason: SURG

## 2021-11-22 RX ORDER — PHENYLEPHRINE HYDROCHLORIDE 10 MG/ML
INJECTION, SOLUTION INTRAMUSCULAR; INTRAVENOUS; SUBCUTANEOUS PRN
Status: DISCONTINUED | OUTPATIENT
Start: 2021-11-22 | End: 2021-11-22 | Stop reason: SURG

## 2021-11-22 RX ORDER — HYDROMORPHONE HYDROCHLORIDE 1 MG/ML
0.1 INJECTION, SOLUTION INTRAMUSCULAR; INTRAVENOUS; SUBCUTANEOUS
Status: DISCONTINUED | OUTPATIENT
Start: 2021-11-22 | End: 2021-11-22 | Stop reason: HOSPADM

## 2021-11-22 RX ORDER — SODIUM CHLORIDE, SODIUM LACTATE, POTASSIUM CHLORIDE, CALCIUM CHLORIDE 600; 310; 30; 20 MG/100ML; MG/100ML; MG/100ML; MG/100ML
INJECTION, SOLUTION INTRAVENOUS CONTINUOUS
Status: DISCONTINUED | OUTPATIENT
Start: 2021-11-22 | End: 2021-11-22 | Stop reason: HOSPADM

## 2021-11-22 RX ORDER — LIDOCAINE HYDROCHLORIDE 20 MG/ML
INJECTION, SOLUTION INFILTRATION; PERINEURAL
Status: COMPLETED
Start: 2021-11-22 | End: 2021-11-22

## 2021-11-22 RX ORDER — METOPROLOL TARTRATE 50 MG/1
50 TABLET, FILM COATED ORAL 2 TIMES DAILY
Status: DISCONTINUED | OUTPATIENT
Start: 2021-11-22 | End: 2021-11-23 | Stop reason: HOSPADM

## 2021-11-22 RX ORDER — SODIUM CHLORIDE, SODIUM LACTATE, POTASSIUM CHLORIDE, CALCIUM CHLORIDE 600; 310; 30; 20 MG/100ML; MG/100ML; MG/100ML; MG/100ML
INJECTION, SOLUTION INTRAVENOUS
Status: DISCONTINUED | OUTPATIENT
Start: 2021-11-22 | End: 2021-11-22 | Stop reason: SURG

## 2021-11-22 RX ORDER — PRAVASTATIN SODIUM 20 MG
80 TABLET ORAL DAILY
Status: DISCONTINUED | OUTPATIENT
Start: 2021-11-22 | End: 2021-11-23 | Stop reason: HOSPADM

## 2021-11-22 RX ORDER — ONDANSETRON 2 MG/ML
4 INJECTION INTRAMUSCULAR; INTRAVENOUS
Status: COMPLETED | OUTPATIENT
Start: 2021-11-22 | End: 2021-11-22

## 2021-11-22 RX ORDER — ISOPROTERENOL HYDROCHLORIDE 0.2 MG/ML
INJECTION, SOLUTION INTRAVENOUS
Status: COMPLETED
Start: 2021-11-22 | End: 2021-11-22

## 2021-11-22 RX ORDER — PROTAMINE SULFATE 10 MG/ML
INJECTION, SOLUTION INTRAVENOUS
Status: COMPLETED
Start: 2021-11-22 | End: 2021-11-22

## 2021-11-22 RX ORDER — DIGOXIN 125 MCG
125 TABLET ORAL DAILY
Status: DISCONTINUED | OUTPATIENT
Start: 2021-11-22 | End: 2021-11-23 | Stop reason: HOSPADM

## 2021-11-22 RX ORDER — BUPIVACAINE HYDROCHLORIDE 2.5 MG/ML
INJECTION, SOLUTION EPIDURAL; INFILTRATION; INTRACAUDAL
Status: COMPLETED
Start: 2021-11-22 | End: 2021-11-22

## 2021-11-22 RX ORDER — HEPARIN SODIUM 200 [USP'U]/100ML
INJECTION, SOLUTION INTRAVENOUS
Status: COMPLETED
Start: 2021-11-22 | End: 2021-11-22

## 2021-11-22 RX ORDER — HYDROMORPHONE HYDROCHLORIDE 1 MG/ML
0.4 INJECTION, SOLUTION INTRAMUSCULAR; INTRAVENOUS; SUBCUTANEOUS
Status: DISCONTINUED | OUTPATIENT
Start: 2021-11-22 | End: 2021-11-22 | Stop reason: HOSPADM

## 2021-11-22 RX ORDER — AMIODARONE HYDROCHLORIDE 200 MG/1
200 TABLET ORAL DAILY
Status: DISCONTINUED | OUTPATIENT
Start: 2021-11-22 | End: 2021-11-23 | Stop reason: HOSPADM

## 2021-11-22 RX ORDER — CEFAZOLIN SODIUM 1 G/3ML
INJECTION, POWDER, FOR SOLUTION INTRAMUSCULAR; INTRAVENOUS PRN
Status: DISCONTINUED | OUTPATIENT
Start: 2021-11-22 | End: 2021-11-22 | Stop reason: SURG

## 2021-11-22 RX ORDER — LIDOCAINE HYDROCHLORIDE 40 MG/ML
SOLUTION TOPICAL
Status: COMPLETED
Start: 2021-11-22 | End: 2021-11-22

## 2021-11-22 RX ORDER — OXYCODONE HCL 5 MG/5 ML
10 SOLUTION, ORAL ORAL
Status: DISCONTINUED | OUTPATIENT
Start: 2021-11-22 | End: 2021-11-22 | Stop reason: HOSPADM

## 2021-11-22 RX ORDER — OXYCODONE HCL 5 MG/5 ML
5 SOLUTION, ORAL ORAL
Status: DISCONTINUED | OUTPATIENT
Start: 2021-11-22 | End: 2021-11-22 | Stop reason: HOSPADM

## 2021-11-22 RX ORDER — HEPARIN SODIUM 1000 [USP'U]/ML
INJECTION, SOLUTION INTRAVENOUS; SUBCUTANEOUS
Status: COMPLETED
Start: 2021-11-22 | End: 2021-11-22

## 2021-11-22 RX ORDER — HYDROMORPHONE HYDROCHLORIDE 1 MG/ML
0.2 INJECTION, SOLUTION INTRAMUSCULAR; INTRAVENOUS; SUBCUTANEOUS
Status: DISCONTINUED | OUTPATIENT
Start: 2021-11-22 | End: 2021-11-22 | Stop reason: HOSPADM

## 2021-11-22 RX ORDER — MEPERIDINE HYDROCHLORIDE 25 MG/ML
6.25 INJECTION INTRAMUSCULAR; INTRAVENOUS; SUBCUTANEOUS
Status: DISCONTINUED | OUTPATIENT
Start: 2021-11-22 | End: 2021-11-22 | Stop reason: HOSPADM

## 2021-11-22 RX ORDER — DIPHENHYDRAMINE HYDROCHLORIDE 50 MG/ML
12.5 INJECTION INTRAMUSCULAR; INTRAVENOUS
Status: DISCONTINUED | OUTPATIENT
Start: 2021-11-22 | End: 2021-11-22 | Stop reason: HOSPADM

## 2021-11-22 RX ADMIN — METOPROLOL TARTRATE 50 MG: 50 TABLET, FILM COATED ORAL at 21:05

## 2021-11-22 RX ADMIN — PHENYLEPHRINE HYDROCHLORIDE 200 MCG: 10 INJECTION INTRAVENOUS at 15:39

## 2021-11-22 RX ADMIN — ONDANSETRON 4 MG: 2 INJECTION INTRAMUSCULAR; INTRAVENOUS at 16:56

## 2021-11-22 RX ADMIN — PHENYLEPHRINE HYDROCHLORIDE 200 MCG: 10 INJECTION INTRAVENOUS at 15:04

## 2021-11-22 RX ADMIN — ROCURONIUM BROMIDE 10 MG: 10 INJECTION, SOLUTION INTRAVENOUS at 16:00

## 2021-11-22 RX ADMIN — HEPARIN SODIUM 4000 UNITS: 200 INJECTION, SOLUTION INTRAVENOUS at 13:00

## 2021-11-22 RX ADMIN — HEPARIN SODIUM 5000 UNITS: 1000 INJECTION, SOLUTION INTRAVENOUS; SUBCUTANEOUS at 14:54

## 2021-11-22 RX ADMIN — PHENYLEPHRINE HYDROCHLORIDE 200 MCG: 10 INJECTION INTRAVENOUS at 14:38

## 2021-11-22 RX ADMIN — SUGAMMADEX 200 MG: 100 INJECTION, SOLUTION INTRAVENOUS at 16:25

## 2021-11-22 RX ADMIN — PHENYLEPHRINE HYDROCHLORIDE 200 MCG: 10 INJECTION INTRAVENOUS at 14:36

## 2021-11-22 RX ADMIN — HEPARIN SODIUM 5000 UNITS: 1000 INJECTION, SOLUTION INTRAVENOUS; SUBCUTANEOUS at 15:30

## 2021-11-22 RX ADMIN — BUPIVACAINE HYDROCHLORIDE: 2.5 INJECTION, SOLUTION EPIDURAL; INFILTRATION; INTRACAUDAL; PERINEURAL at 14:18

## 2021-11-22 RX ADMIN — PROPOFOL 200 MG: 10 INJECTION, EMULSION INTRAVENOUS at 14:00

## 2021-11-22 RX ADMIN — LIDOCAINE HYDROCHLORIDE 4 ML: 40 SOLUTION TOPICAL at 14:01

## 2021-11-22 RX ADMIN — ISOPROTERENOL HYDROCHLORIDE 200 MCG: 0.2 INJECTION, SOLUTION INTRAMUSCULAR; INTRAVENOUS at 16:16

## 2021-11-22 RX ADMIN — DIGOXIN 125 MCG: 125 TABLET ORAL at 21:05

## 2021-11-22 RX ADMIN — HEPARIN SODIUM 2000 UNITS: 200 INJECTION, SOLUTION INTRAVENOUS at 13:00

## 2021-11-22 RX ADMIN — LIDOCAINE HYDROCHLORIDE: 20 INJECTION, SOLUTION INFILTRATION; PERINEURAL at 14:18

## 2021-11-22 RX ADMIN — ASPIRIN 81 MG: 81 TABLET, COATED ORAL at 21:05

## 2021-11-22 RX ADMIN — CEFAZOLIN 2 G: 330 INJECTION, POWDER, FOR SOLUTION INTRAMUSCULAR; INTRAVENOUS at 14:06

## 2021-11-22 RX ADMIN — PRAVASTATIN SODIUM 80 MG: 20 TABLET ORAL at 21:05

## 2021-11-22 RX ADMIN — ROCURONIUM BROMIDE 80 MG: 10 INJECTION, SOLUTION INTRAVENOUS at 14:00

## 2021-11-22 RX ADMIN — AMIODARONE HYDROCHLORIDE 200 MG: 200 TABLET ORAL at 21:05

## 2021-11-22 RX ADMIN — SODIUM CHLORIDE, POTASSIUM CHLORIDE, SODIUM LACTATE AND CALCIUM CHLORIDE: 600; 310; 30; 20 INJECTION, SOLUTION INTRAVENOUS at 13:57

## 2021-11-22 RX ADMIN — HEPARIN SODIUM 13000 UNITS: 1000 INJECTION, SOLUTION INTRAVENOUS; SUBCUTANEOUS at 14:32

## 2021-11-22 RX ADMIN — PROTAMINE SULFATE 50 MG: 10 INJECTION, SOLUTION INTRAVENOUS at 16:16

## 2021-11-22 RX ADMIN — APIXABAN 5 MG: 5 TABLET, FILM COATED ORAL at 21:05

## 2021-11-22 RX ADMIN — PHENYLEPHRINE HYDROCHLORIDE 200 MCG: 10 INJECTION INTRAVENOUS at 15:24

## 2021-11-22 ASSESSMENT — COGNITIVE AND FUNCTIONAL STATUS - GENERAL
SUGGESTED CMS G CODE MODIFIER MOBILITY: CH
SUGGESTED CMS G CODE MODIFIER DAILY ACTIVITY: CH
DAILY ACTIVITIY SCORE: 24
MOBILITY SCORE: 24

## 2021-11-22 ASSESSMENT — CHA2DS2 SCORE
HYPERTENSION: YES
CHF OR LEFT VENTRICULAR DYSFUNCTION: NO
AGE 75 OR GREATER: NO
DIABETES: NO
PRIOR STROKE OR TIA OR THROMBOEMBOLISM: NO
AGE 65 TO 74: YES
SEX: MALE
VASCULAR DISEASE: YES
CHA2DS2 VASC SCORE: 3

## 2021-11-22 ASSESSMENT — LIFESTYLE VARIABLES
EVER HAD A DRINK FIRST THING IN THE MORNING TO STEADY YOUR NERVES TO GET RID OF A HANGOVER: NO
TOTAL SCORE: 0
CONSUMPTION TOTAL: INCOMPLETE
ALCOHOL_USE: NO
EVER FELT BAD OR GUILTY ABOUT YOUR DRINKING: NO
TOTAL SCORE: 0
HAVE PEOPLE ANNOYED YOU BY CRITICIZING YOUR DRINKING: NO
HAVE YOU EVER FELT YOU SHOULD CUT DOWN ON YOUR DRINKING: NO
TOTAL SCORE: 0

## 2021-11-22 ASSESSMENT — PATIENT HEALTH QUESTIONNAIRE - PHQ9
SUM OF ALL RESPONSES TO PHQ9 QUESTIONS 1 AND 2: 0
1. LITTLE INTEREST OR PLEASURE IN DOING THINGS: NOT AT ALL
2. FEELING DOWN, DEPRESSED, IRRITABLE, OR HOPELESS: NOT AT ALL

## 2021-11-22 ASSESSMENT — FIBROSIS 4 INDEX: FIB4 SCORE: 2.51

## 2021-11-22 NOTE — ANESTHESIA PROCEDURE NOTES
Airway    Date/Time: 11/22/2021 2:01 PM  Performed by: David Lewis M.D.  Authorized by: David Lewis M.D.     Location:  OR  Urgency:  Elective  Difficult Airway: No    Indications for Airway Management:  Anesthesia      Spontaneous Ventilation: absent    Sedation Level:  Deep  Preoxygenated: Yes    Patient Position:  Sniffing  Mask Difficulty Assessment:  0 - not attempted  Final Airway Type:  Endotracheal airway  Final Endotracheal Airway:  ETT  Cuffed: Yes    Technique Used for Successful ETT Placement:  Direct laryngoscopy    Insertion Site:  Oral  Blade Type:  Nellie  Laryngoscope Blade/Videolaryngoscope Blade Size:  3  ETT Size (mm):  7.0  Measured from:  Teeth  ETT to Teeth (cm):  21  Placement Verified by: auscultation and capnometry    Cormack-Lehane Classification:  Grade I - full view of glottis  Number of Attempts at Approach:  1

## 2021-11-22 NOTE — H&P
"[facility]  Electrophysiology Pre-procedure H&P    DOS:11/22/2021    Planned Procedure: VT ablation    Chief complaint/Reason for Procedure: VT    HPI: 72 y/o M with VT despite amiodarone referred for ablation, prior CAD s/p CABG      Past Medical History:   Diagnosis Date   • Anesthesia     ponv   • Arthritis     shoulder   • Atrial fibrillation (HCC)    • Chickenpox    • Chronic bronchitis (HCC)    • Dental disorder     Full set dentures   • Disorder of thyroid     Hypothyroid   • Emphysema of lung (HCC)    • Maori measles    • Hemorrhagic disorder (HCC)     On Blood thinners   • High cholesterol    • Hypertension    • Influenza    • Mumps    • Myocardial infarct (HCC) 87, ?89, 2007    x 3   • Other and unspecified angina pectoris 10/2016    Defibulator   • Pacemaker 10/20/2016    Pacemaker/Defib   • Pain     shoulder   • PONV (postoperative nausea and vomiting)        Past Surgical History:   Procedure Laterality Date   • COLONOSCOPY  10/18/2018    Procedure: COLONOSCOPY - W/POSS BX, DILATION, POLYPECTOMY, CONTROL OF HEMORRHAGE;  Surgeon: Pineda Horvath M.D.;  Location: SURGERY [facility];  Service: EUS   • PACEMAKER INSERTION  10/20/2016   • INGUINAL HERNIA REPAIR BILATERAL  3/2/2015    Performed by Jack Tobias M.D. at SURGERY SAME DAY [facility] ORS   • COLONOSCOPY  2008   • MULTIPLE CORONARY ARTERY BYPASS  1991    stents (X 3 placement= stents total)   • APPENDECTOMY CHILD     • OTHER ORTHOPEDIC SURGERY      knee arthoscopy, bilaterally (\"early 80's\")   • TONSILLECTOMY  as a child       Social History     Socioeconomic History   • Marital status:      Spouse name: Not on file   • Number of children: Not on file   • Years of education: Not on file   • Highest education level: Not on file   Occupational History   • Not on file   Tobacco Use   • Smoking status: Light Tobacco Smoker     Packs/day: 0.50     Years: 45.00     Pack years: 22.50     Types: Cigarettes   • Smokeless " tobacco: Never Used   • Tobacco comment: since age 11 with a ~10-12 year break; he has cut down most recently to 4-5 day   Vaping Use   • Vaping Use: Never used   Substance and Sexual Activity   • Alcohol use: Yes     Alcohol/week: 0.6 oz     Types: 1 Cans of beer per week     Comment: monthly   • Drug use: No   • Sexual activity: Not Currently   Other Topics Concern   • Not on file   Social History Narrative   • Not on file     Social Determinants of Health     Financial Resource Strain:    • Difficulty of Paying Living Expenses: Not on file   Food Insecurity:    • Worried About Running Out of Food in the Last Year: Not on file   • Ran Out of Food in the Last Year: Not on file   Transportation Needs:    • Lack of Transportation (Medical): Not on file   • Lack of Transportation (Non-Medical): Not on file   Physical Activity:    • Days of Exercise per Week: Not on file   • Minutes of Exercise per Session: Not on file   Stress:    • Feeling of Stress : Not on file   Social Connections:    • Frequency of Communication with Friends and Family: Not on file   • Frequency of Social Gatherings with Friends and Family: Not on file   • Attends [employer] Services: Not on file   • Active Member of Clubs or Organizations: Not on file   • Attends Club or Organization Meetings: Not on file   • Marital Status: Not on file   Intimate Partner Violence:    • Fear of Current or Ex-Partner: Not on file   • Emotionally Abused: Not on file   • Physically Abused: Not on file   • Sexually Abused: Not on file   Housing Stability:    • Unable to Pay for Housing in the Last Year: Not on file   • Number of Places Lived in the Last Year: Not on file   • Unstable Housing in the Last Year: Not on file       Family History   Problem Relation Age of Onset   • Heart Attack Mother    • Heart Disease Mother    • Hypertension Mother    • Cancer Father    • Heart Attack Father         s/p CABG   • Heart Disease Father         AAA   • Hypertension Father   "  • Heart Attack Paternal Grandmother    • Heart Attack Paternal Grandfather        No Known Allergies    Current Facility-Administered Medications   Medication Dose Route Frequency Provider Last Rate Last Admin   • BUPIVACAINE HCL (PF) 0.25 % INJ SOLN            • LIDOCAINE HCL 2 % INJ SOLN            • HEPARIN (PORCINE) IN NACL 2000-0.9 UNIT/L-% IV SOLN            • HEPARIN (PORCINE) IN NACL 2000-0.9 UNIT/L-% IV SOLN                Physical Exam:  Vitals:    11/17/21 0958 11/22/21 1055   BP:  107/56   Pulse:  63   Resp:  16   Temp:  36.6 °C (97.8 °F)   TempSrc:  Temporal   SpO2:  95%   Weight: 88.4 kg (194 lb 14.2 oz) 86.2 kg (190 lb 0.6 oz)   Height: 1.803 m (5' 11\") 1.803 m (5' 11\")     General appearance: NAD, conversant   Neck: Trachea midline; FROM, supple, no thyromegaly or lymphadenopathy  CV: RRR, no MRGs, no JVD   Extremities: No peripheral edema or extremity lymphadenopathy  Skin: Normal temperature, turgor and texture; no rash, ulcers or subcutaneous nodules  Psych: Appropriate affect, alert and oriented to person, place and time    Data:  Lab Results   Component Value Date/Time    CHOLSTRLTOT 204 (H) 10/20/2021 07:36 AM     (H) 10/20/2021 07:36 AM    HDL 33 (A) 10/20/2021 07:36 AM    TRIGLYCERIDE 319 (H) 10/20/2021 07:36 AM       Lab Results   Component Value Date/Time    SODIUM 137 11/17/2021 10:28 AM    POTASSIUM 4.5 11/17/2021 10:28 AM    CHLORIDE 102 11/17/2021 10:28 AM    CO2 23 11/17/2021 10:28 AM    GLUCOSE 91 11/17/2021 10:28 AM    BUN 16 11/17/2021 10:28 AM    CREATININE 0.93 11/17/2021 10:28 AM     Lab Results   Component Value Date/Time    ALKPHOSPHAT 85 11/17/2021 10:28 AM    ASTSGOT 13 11/17/2021 10:28 AM    ALTSGPT 6 11/17/2021 10:28 AM    TBILIRUBIN 0.5 11/17/2021 10:28 AM      No results found for: BNPBTYPENAT            EKG interpreted by me: Atrial paced    Impression/Plan:  1) CAD s/p CABG  2) VT     - Plan VT ablation  - Risks include vascular access bleeding or pain, " infection, stroke, myocardial infarction, cardiac tamponade, pericardial effusion, myocardial perforation, major bleeding, phrenic nerve damage, heart block requiring a pacemaker, and death. Risk of major adverse event is ~1%. Success rates long term are 80-95% depending on the arrhythmia induced and the acute success in the EP lab.        Gabe Campbell MD  Cardiac Electrophysiology

## 2021-11-22 NOTE — ANESTHESIA PREPROCEDURE EVALUATION
" Date/Time: 11/22/21 1230    Scheduled providers: Gabe Campbell M.D.; David Lewis M.D.    Procedure: CL-EP ABLATION VENTRICULAR TACHYCARDIA    Diagnosis: Ventricular tachycardia (HCC) [I47.2]    Indications: See Associated Dx    Location: [facility] IMAGING - CATH LAB - [facility]          Relevant Problems   PULMONARY   (positive) Chronic bronchitis (HCC)   (positive) Emphysema of lung (HCC)      NEURO   (positive) History of MI (myocardial infarction)      CARDIAC   (positive) Abdominal aortic aneurysm (AAA) without rupture (HCC)   (positive) Aortic aneurysm (HCC)   (positive) Chronic atrial fibrillation (HCC)   (positive) Coronary artery disease involving native coronary artery of native heart without angina pectoris   (positive) Essential hypertension   (positive) Hx of CABG   (positive) Pacemaker   (positive) Paroxysmal atrial fibrillation (HCC)   (positive) Ventricular tachycardia (HCC)      ENDO   (positive) Hypothyroidism     /56   Pulse 63   Temp 36.6 °C (97.8 °F) (Temporal)   Resp 16   Ht 1.803 m (5' 11\")   Wt 86.2 kg (190 lb 0.6 oz)   SpO2 95%   BMI 26.50 kg/m²     Physical Exam    Airway   Mallampati: II  TM distance: >3 FB  Neck ROM: full       Cardiovascular - normal exam  Rhythm: regular  Rate: normal  (-) murmur     Dental - normal exam           Pulmonary - normal exam  Breath sounds clear to auscultation     Abdominal    Neurological - normal exam                 Anesthesia Plan    ASA 3   ASA physical status 3 criteria: COPD    Plan - general       Airway plan will be ETT          Induction: intravenous    Postoperative Plan: Postoperative administration of opioids is intended.    Pertinent diagnostic labs and testing reviewed    Informed Consent:    Anesthetic plan and risks discussed with patient.    Use of blood products discussed with: patient whom consented to blood products.         "

## 2021-11-22 NOTE — ANESTHESIA PROCEDURE NOTES
Arterial Line  Performed by: David Lewis M.D.  Authorized by: David Lewis M.D.     Start Time:  11/22/2021 2:05 PM  End Time:  11/22/2021 2:07 PM  Localization: surface landmarks    Patient Location:  OR  Indication: continuous blood pressure monitoring        Catheter Size:  20 G  Seldinger Technique?: Yes    Laterality:  Right  Site:  Radial artery  Line Secured:  Antimicrobial disc, tape and transparent dressing  Events: patient tolerated procedure well with no complications

## 2021-11-23 VITALS
SYSTOLIC BLOOD PRESSURE: 122 MMHG | DIASTOLIC BLOOD PRESSURE: 57 MMHG | OXYGEN SATURATION: 92 % | RESPIRATION RATE: 16 BRPM | WEIGHT: 190.04 LBS | HEIGHT: 71 IN | TEMPERATURE: 97.2 F | HEART RATE: 61 BPM | BODY MASS INDEX: 26.6 KG/M2

## 2021-11-23 PROBLEM — Z98.890 H/O CARDIAC RADIOFREQUENCY ABLATION: Status: ACTIVE | Noted: 2021-11-23

## 2021-11-23 LAB
ACT BLD: 274 SEC (ref 74–137)
EKG IMPRESSION: NORMAL

## 2021-11-23 PROCEDURE — A9270 NON-COVERED ITEM OR SERVICE: HCPCS | Performed by: INTERNAL MEDICINE

## 2021-11-23 PROCEDURE — G0378 HOSPITAL OBSERVATION PER HR: HCPCS

## 2021-11-23 PROCEDURE — 700102 HCHG RX REV CODE 250 W/ 637 OVERRIDE(OP): Performed by: INTERNAL MEDICINE

## 2021-11-23 PROCEDURE — 93010 ELECTROCARDIOGRAM REPORT: CPT | Performed by: INTERNAL MEDICINE

## 2021-11-23 PROCEDURE — 99217 PR OBSERVATION CARE DISCHARGE: CPT | Performed by: NURSE PRACTITIONER

## 2021-11-23 PROCEDURE — 93005 ELECTROCARDIOGRAM TRACING: CPT | Performed by: INTERNAL MEDICINE

## 2021-11-23 RX ADMIN — APIXABAN 5 MG: 5 TABLET, FILM COATED ORAL at 05:16

## 2021-11-23 RX ADMIN — METOPROLOL TARTRATE 50 MG: 50 TABLET, FILM COATED ORAL at 05:16

## 2021-11-23 RX ADMIN — LEVOTHYROXINE SODIUM 100 MCG: 0.1 TABLET ORAL at 05:16

## 2021-11-23 RX ADMIN — LISINOPRIL 5 MG: 5 TABLET ORAL at 05:16

## 2021-11-23 ASSESSMENT — PAIN SCALES - GENERAL: PAIN_LEVEL: 0

## 2021-11-23 ASSESSMENT — PAIN DESCRIPTION - PAIN TYPE: TYPE: ACUTE PAIN

## 2021-11-23 NOTE — PROGRESS NOTES
Assumed care of pt. Bedside report received from Chiara CORRAL. Pt was updated on plan of care. Call light, phone and personal belongings in reach, bed in lowest position, and locked.       covid 19 surge in effect

## 2021-11-23 NOTE — DISCHARGE SUMMARY
CHIEF COMPLAINT ON ADMISSION  Admission for elective VT Ablation.     CODE STATUS  Full Code    HPI & HOSPITAL COURSE  This is a 71 y.o. year old male here for elective ablation of ventricular tachycardia.  He is followed longitudinally by [facility], Dr Read and referred to Dr. Campbell for consideration of VT ablation after ICD discharge x 3 and found to have slow VT in the 130s.      Past medical history significant for ICM, chronic systolic heart failure with an EF of approximately 40%, CAD S/P CABG and PCI, ICD in situ, high risk medication use with amiodarone.     Procedural Conclusions per Dr. Campbell's Op Note dated 11/22/21   PROCEDURES PERFORMED:  1.  Electrophysiology with radiofrequency ablation of ventricular tachycardia.  2.  Ablation of additional tachycardia x2 (3 VT morphologies)  3.  Intracardiac echocardiography.  4.  IV drug infusion  5.  Trans-septal catheterization  6.  Left atrial pacing and recording  7. Lara procedural device reprogramming x2  RESULTS:  1.  Baseline interval showed a NV interval of 300 milliseconds, a QRS duration   of 113 milliseconds and QT interval of 513 msec.  2. AVBCL was 700ms. There was no VA conduction at baseline.  3. Sinus map showed a large inferior wall scar with late conduction into the scar. Pace mapping showed evidence of at least 1 exit site with 99% match.  4.  Total number of ablations 30, ablation time 1396s   CONCLUSION:  1.  Sustained monomorphic ventricular tachycardia with 3 unique morphologies  2. Successful ablation of 3 VT morphologies and substrate-based ablation of a large inferior wall scar in the left ventricle.  RECOMMENDATIONS  1. Admit for telemetry observation  2. 4 hours bed rest  3. Continue amiodarone    The patient has been seen and examined in EP rounds this AM.  His monitored rhythm is atrial paced presently.  Telemetry history has been reviewed and is without arrhtyhmias.   EKG, pre procedure lab data, and vital signs have been  reviewed and are stable. The patient denies any chest pain, dyspnea, dizziness, paraesthesias, or other complaints.  His physical exam is WNL; specifically his bilateral femoral access sites are clean and dry; there is no evidence of hemtoma or significant ecchymosis.  He has been out of bed and ambulated without difficulty.     Therefore, he is discharged in fair and stable condition with close outpatient follow-up.    PROCEDURES  VT ablation, Dr. Campbell.  Please seen full procedure note for details.     CONSULTATIONS  None.     DISCHARGE PROBLEM LIST  Principal Problem:    H/O cardiac radiofrequency ablation: 11/22/21.  RFA of 3 different VT morphologies from lateral wall scar.  Dr Campbell  POA: Unknown  Active Problems:    AICD (automatic cardioverter/defibrillator) present POA: Yes    Ventricular tachycardia (HCC) POA: Yes    MEDICATIONS ON DISCHARGE     Medication List      CONTINUE taking these medications      Instructions   albuterol 108 (90 Base) MCG/ACT Aers inhalation aerosol   Inhale 2 Puffs by mouth every 6 hours as needed for Shortness of Breath.  Dose: 2 Puff     amiodarone 200 MG Tabs  Commonly known as: Cordarone   Take 200 mg by mouth every day.  Dose: 200 mg     aspirin EC 81 MG Tbec  Commonly known as: ECOTRIN   Take 81 mg by mouth every day.  Dose: 81 mg     digoxin 125 MCG Tabs  Commonly known as: LANOXIN   Take 125 mcg by mouth every day. For 90 days  Dose: 125 mcg     Eliquis 5mg Tabs  Generic drug: apixaban   Take 5 mg by mouth 2 Times a Day.  Dose: 5 mg     levothyroxine 100 MCG Tabs  Commonly known as: SYNTHROID   Take 1 Tablet by mouth every morning on an empty stomach.  Dose: 100 mcg     lisinopril 5 MG Tabs  Commonly known as: PRINIVIL   Take 5 mg by mouth every day.  Dose: 5 mg     magnesium oxide 400 MG Tabs tablet  Commonly known as: MAG-OX   Take 400 mg by mouth every day.  Dose: 400 mg     metoprolol tartrate 25 MG Tabs  Commonly known as: LOPRESSOR   Take 50 mg by mouth 2 times a  day.  Dose: 50 mg     nitroglycerin 0.4 MG Subl  Commonly known as: NITROSTAT   Place 0.4 mg under the tongue as needed.  Dose: 0.4 mg     pravastatin 80 MG tablet  Commonly known as: PRAVACHOL   Take 1 Tablet by mouth every day.  Dose: 80 mg          Medications at time of discharge have been reviewed in detail with the patient.   DIET  Cardiac    POST ABLATION INSTRUCTIONS:  1. No lifting > 10 lbs x 1 week.    2. No soaking in baths, hot tubs, pools x 1 week.  May shower the day after discharge and take off groin dressings and right wrist dressing and leave  sites uncovered.  Continue to monitor sites daily for warmth, redness, discolored drainage.  It is common to have a small lump in the area where the cather was (usually the size of a marble); this will go away but takes approximately 6 weeks to normalize.   3.   Please take all medications as prescribed to you; please do not stop any medications prescribed post ablation unless directed by your healthcare provider.    4.   Please do not miss any doses of your blood thinner (if you have been started on, or take chronic blood thinners) without discussion with your healthcare provider first.   5.   Please walk and take deep breaths after discharge.  After discharge, if you experience neurological changes/signs of stroke or high fever you should be seen in the emergency dept.   6.   It is possible you may experience some chest discomfort or chest tightness post ablation.  This is usually secondary to inflammation and irritation of the tissues at the area of the ablation.  If this occurs, it is advised to try 400 mg of Ibuprofen with food as needed up to three times a day for a maximum of two days.  This should help to decrease pain and tissue inflammation.          **Please notify the office ([phone]) if this occurs.         ** DO NOT TAKE Ibuprofen IF HISTORY OF ALLERGY, SIGNIFICANT BLEEDING OR KIDNEY DISEASE WITHOUT DISCUSSING WITH YOUR CARDIOLOGY PROVIDER  FIRST.         ** If pain becomes severe or you have additional symptoms you may need to be medically evaluated; please contact the cardiology office ([phone]) for further direction.   7. If your device shocks you or you have  symptoms of sustained ventricular tachycardia please be seen in the emergency dept.   8.  Please contact call our office ([phone]) or message via [employer] message if you have any questions or concerns post procedurally.  9. You need to be seen for post ablation follow up 3-4 weeks post procedure. An appointment is scheduled for you.  Please contact the office ([phone]) if you need to change your appointment.        LABORATORY  Lab Results   Component Value Date/Time    SODIUM 137 11/17/2021 10:28 AM    POTASSIUM 4.5 11/17/2021 10:28 AM    CHLORIDE 102 11/17/2021 10:28 AM    CO2 23 11/17/2021 10:28 AM    GLUCOSE 91 11/17/2021 10:28 AM    BUN 16 11/17/2021 10:28 AM    CREATININE 0.93 11/17/2021 10:28 AM        Lab Results   Component Value Date/Time    WBC 5.5 11/17/2021 10:28 AM    HEMOGLOBIN 15.0 11/17/2021 10:28 AM    HEMATOCRIT 44.3 11/17/2021 10:28 AM    PLATELETCT 148 (L) 11/17/2021 10:28 AM        FOLLOW UP  Future Appointments   Date Time Provider Department Center   12/3/2021  8:30 AM V[facility]ULAR LAB [facility] KEYLA Johnson   12/22/2021 11:20 AM Gabe Campbell M.D. [facility] None   1/12/2022  9:30 AM [facility] Milligan     SPECIFIC OUTPATIENT FOLLOW-UP  Patient will be seen in approximately 3-4 weeks for procedural follow up.    The above discharge plan was created in collaboration with Dr. Campbell and discussed in detail with the patient and he verbalizes understanding and is in agreement with the discharge plan.     MOIZ Dennis.   11/23/2021 8:40 AM

## 2021-11-23 NOTE — OR NURSING
1636 Pt arrived to PACU with cath lab RN. AAOx4. VSS. Denies pain and nausea. Bilateral groin dressings CDI and soft. Bed rest ordered for 4 hours until 2030. Belongings returned to pt bedside.     1710 POC update given to wife in waiting area. All questions answered.     1545 Pt meets criteria. Waiting for room assignment.     1920 Report called to ELLIS Guadarrama on tele.     1942 Pt transported to T806 with RN. Chart, full oxygen tank, and belongings with patient.

## 2021-11-23 NOTE — DISCHARGE PLANNING
HTH/SCP chart review.  Noted pt has 6 clicks mobility of 24, LACE score of 52; per chart pt has established PCP and appropriate follow up appointments post acute stay as well; No further TCN needs at this time.

## 2021-11-23 NOTE — OP REPORT
[facility]  Electrophysiology Procedure Report    PROCEDURES PERFORMED:  1.  Electrophysiology with radiofrequency ablation of ventricular tachycardia.  2.  Ablation of additional tachycardia x2 (3 VT morphologies)  3.  Intracardiac echocardiography.  4.  IV drug infusion  5.  Trans-septal catheterization  6.  Left atrial pacing and recording  7. Lara procedural device reprogramming x2     PHYSICIAN:  Gabe Campbell MD     ASSISTANT:  No assistants used.     ANESTHESIA:  General anesthesia.     ANESTHESIOLOGIST:  Dr Lewis     INDICATION:  Sustained monomorphic VT     COMPLICATIONS:  None.     PREPROCEDURE EKG:  NSR.     POST-PROCEDURE EKG:  NSR     ESTIMATED BLOOD LOSS:  Less than 20 mL.     NARRATIVE:  After risks and benefits were explained to the patient, the   patient gave informed consent.  The patient was brought to the   electrophysiology lab in fasting state.  The patient was prepped and draped in   usual sterile manner.  General anesthesia was given to the patient as per Dr Lewis.  Next, an arterial line was placed by Dr Lewis in the right radial   Artery. The ICD was reprogrammed to therapies off.  Next, 2% lidocaine was infiltrated subcutaneously over the right and left  femoral region.  2 8F sheaths were placed in the right   femoral vein using modified Seldinger technique. 2 sheaths, 10F and 6F, were placed in the left femoral vein. The right femoral artery was accessed and a 8F sheath was placed to retain arterial access.   Next, a quadripolar catheter was advanced to the right ventricular apex for   baseline pacing.    Next, an ICE catheter was placed into the right atrium for visualization of the right ventricle and left ventricle and CartoSound mapping of the left ventricle and right ventricle. Also monitoring for potential effusion. A decapolar catheter was advanced out the CS for left atrial pacing and recording. An 8F sheath was removed over a wire and exchanged for a large  curve Vizigo sheath which was advanced under ICE guidance into the SVC. A [place] needle was introduced into the dilator, the sheath was brought down to the fossa ovalis under ICE guidance. 13,000 U heparin was given, additional was given through the case to maintain -350. A sinus map was performed of the left ventricle using the PentaRay catheter.  There was significant amount of scarring seen at the inferior wall with late signal post-QRS seen within the scar and isochronal color crowding.      VT #1 TCL 410ms was easily induced with ventricular pacing. This had a RBRI axis consistent with lateral wall exit site. He did not tolerate VT hemodynamically so mapping was not performed in VT. Instead the lateral wall scar was interrogated and several LAVA areas were targeted. Pace mapping was performed near the lateral border of the inferior wall scar which produced a 99% match to VT morphology. Ablation was performed here at 40W and all along the lateral border of the scar, and within the basal aspect of the scar where local capture was elicited, specifically targeting sites with long stim-QRS timing and multiple exit sites.    Re-induction was performed with VOD pacing and VT #2 was inducible, TCL 265ms. This was not hemodynamically tolerated, pace-termination attempts accelerated the rhythm into VF requiring external defibrillation at 200J. This axis appeared more consistent with an apical exit of the lateral scar. Further ablation was performed in the apical aspect of the lateral wall scar where local capture was elicited, specifically targeting sites with long stim-QRS timing and multiple exit sites.     Repeat programmed stimulation was performed and VT #3 was induced, TCL 380ms, not well tolerated however and thus this was pace terminated. This had a RBLS axis more consistent with a basal septal exit of the lateral wall scar. Further ablation was performed along the septal aspect of the scar, extending the  lesion set to the border zones of healthy septal tissue.    Following ablation for 3 VT morphologies, we attempted further induction with ventricular triple extrastimuli, drive drain down to 450ms, with extras down to VERP, on Isuprel 1 mcg/min. No sustained VT was inducible.    The patient received heparin to maintain an ACT greater than 250 to 300 sec throughout the procedure.  Following the patient receiving protamine   50 mg, the sheaths were removed and hemostasis was obtained with direct Vascade MVP closure devices.  Angiography was performed showing arteriotomy site in the CFA. Angio-Seal was placed into the arterial site, which was an 8-Faroese site. The ICD was reprogrammed to therapies back on.     RESULTS:  1.  Baseline interval showed a GA interval of 300 milliseconds, a QRS duration   of 113 milliseconds and QT interval of 513 msec.  2. AVBCL was 700ms. There was no VA conduction at baseline.  3. Sinus map showed a large inferior wall scar with late conduction into the scar. Pace mapping showed evidence of at least 1 exit site with 99% match.  4.  Total number of ablations 30, ablation time 1396s      CONCLUSION:  1.  Sustained monomorphic ventricular tachycardia with 3 unique morphologies  2. Successful ablation of 3 VT morphologies and substrate-based ablation of a large inferior wall scar in the left ventricle.    RECOMMENDATIONS  1. Admit for telemetry observation  2. 4 hours bed rest  3. Continue amiodarone

## 2021-11-23 NOTE — ANESTHESIA TIME REPORT
Anesthesia Start and Stop Event Times     Date Time Event    11/22/2021 1356 Ready for Procedure     1357 Anesthesia Start     1637 Anesthesia Stop        Responsible Staff  11/22/21    Name Role Begin End    David Lewis M.D. Anesth 1357 1637        Preop Diagnosis (Free Text):  Pre-op Diagnosis             Preop Diagnosis (Codes):    Premium Reason  Non-Premium    Comments:

## 2021-11-23 NOTE — PROGRESS NOTES
4 Eyes Skin Assessment Completed by Chiara RN and Teodoro RN.    Head WDL  Ears Redness and Blanching  Nose WDL  Mouth WDL  Neck WDL  Breast/Chest Discoloration, burns from defib pads  Shoulder Blades WDL  Spine WDL  (R) Arm/Elbow/Hand WDL   (L) Arm/Elbow/Hand WDL  Abdomen WDL  Groin WDL  Scrotum/Coccyx/Buttocks WDL  (R) Leg WDL  (L) Leg WDL  (R) Heel/Foot/Toe WDL  (L) Heel/Foot/Toe WDL          Devices In Places Tele Box, Pulse Ox and Nasal Cannula      Interventions In Place Gray Ear Foams    Possible Skin Injury No    Pictures Uploaded Into Epic N/A  Wound Consult Placed N/A  RN Wound Prevention Protocol Ordered No

## 2021-11-23 NOTE — PROGRESS NOTES
D/C'd.  Discharge instructions provided to pt.  Pt states understanding.  Pt states all questions have been answered.  Copy of discharge provided to pt.  Signed copy in chart.  Pt states that all personal belongings are in possession.  Pt escorted off unit with assistance from this RN and CNA without incident.

## 2021-11-23 NOTE — PROGRESS NOTES
Pt received from PACU. Tele monitor in place. VSS. Pt oriented to room. Educated on use of the call light. Pt demonstrated use of the call light. Discussed POC. All questions answered.

## 2021-11-23 NOTE — DISCHARGE INSTRUCTIONS
[facility] Post Ablation Patient Instructions:  1. No lifting > 10 lbs x 1 week.      2. No soaking in baths, hot tubs, pools x 1 week.  May shower the day after discharge and take off groin dressings and right wrist dressing and leave  sites uncovered.  Continue to monitor sites daily for warmth, redness, discolored drainage.  It is common to have a small lump in the area where the cather was (usually the size of a marble); this will go away but takes approximately 6 weeks to normalize.     3.   Please take all medications as prescribed to you; please do not stop any medications prescribed post ablation unless directed by your healthcare provider.      4.   Please do not miss any doses of your blood thinner (if you have been started on, or take chronic blood thinners) without discussion with your healthcare provider first.     5.   Please walk and take deep breaths after discharge.  After discharge, if you experience neurological changes/signs of stroke or high fever you should be seen in the emergency dept.     6.   It is possible you may experience some chest discomfort or chest tightness post ablation.  This is usually secondary to inflammation and irritation of the tissues at the area of the ablation.  If this occurs, it is advised to try 400 mg of Ibuprofen with food as needed up to three times a day for a maximum of two days.  This should help to decrease pain and tissue inflammation.          **Please notify the office ([phone]) if this occurs.         ** DO NOT TAKE Ibuprofen IF HISTORY OF ALLERGY, SIGNIFICANT BLEEDING OR KIDNEY DISEASE WITHOUT DISCUSSING WITH YOUR CARDIOLOGY PROVIDER FIRST.          ** If pain becomes severe or you have additional symptoms you may need to be medically evaluated; please contact the cardiology office ([phone]) for further direction.     7. If your device shocks you or you have  symptoms of sustained ventricular tachycardia please  be seen in the emergency dept.     8.  Please contact call our office ([phone]) or message via [employer] if you have any questions or concerns post procedurally.    9. You need to be seen for post ablation follow up 3-4 weeks post procedure. An appointment is scheduled for you.  Please contact the office ([phone]) if you need to change your appointment.        Discharge Instructions    Discharged to home by car with relative. Discharged via wheelchair, hospital escort: Yes.  Special equipment needed: Not Applicable    Be sure to schedule a follow-up appointment with your primary care doctor or any specialists as instructed.     Discharge Plan:        I understand that a diet low in cholesterol, fat, and sodium is recommended for good health. Unless I have been given specific instructions below for another diet, I accept this instruction as my diet prescription.   Other diet: cardiac diet    Special Instructions: None    · Is patient discharged on Warfarin / Coumadin?   No       Ventricular Tachycardia    Ventricular tachycardia is a fast heartbeat that begins in the lower chambers of the heart (ventricles). It is a type of abnormal heart rhythm (arrhythmia). A normal heartbeat usually starts when an area in the heart called the sinoatrial (SA) node releases an electrical signal. With ventricular tachycardia, electrical signals in the lower part of the heart fire abnormally and interfere with the electrical signals sent out by the SA node.  A normal heart rate is  beats per minute. During an episode of ventricular tachycardia, the heart reaches 100 beats per minute or higher. This condition can be life-threatening and should be treated immediately.  What are the causes?  This condition is caused by abnormal electrical activity in the lower part of the heart. This may result from:  · Medicines.  · Diseases of the heart muscle (cardiomyopathy).  · The heart not getting enough oxygen. This may be  caused by blood flow problems in the arteries.  · An inflammatory disease that affects multiple areas of the body (sarcoidosis).  · Drug use, such as cocaine, amphetamine, or anabolic steroid use.  What increases the risk?  You are more likely to develop this condition if:  · You have had a heart attack.  · You have:  ? Heart failure or cardiomyopathy.  ? Heart defects that you were born with (congenital heart defects).  ? Abnormal heart tissue.  ? Heart valves that leak or are narrow.  ? Diabetes.  ? An infection that affects the heart.  ? High blood pressure.  ? An overactive or underactive thyroid.  ? Sleep apnea.  ? A family history of stopped heartbeat (cardiac arrest) or coronary artery disease.  ? High cholesterol.  · You smoke.  · You drink alcohol heavily.  · You use drugs, such as cocaine.  What are the signs or symptoms?  Symptoms of this condition include:  · A pounding heartbeat.  · Feeling as if your heart is skipping beats or fluttering (palpitations).  · Shortness of breath.  · Anxiety.  · Dizziness.  · Light-headedness.  · Fainting.  · Chest pain.  · Cardiac arrest caused by an irregular heartbeat (arrhythmia).  How is this diagnosed?  This condition may be diagnosed based on:  · Your symptoms and medical history.  · A physical exam.  · Electrocardiogram (ECG). This test is done to check for problems with electrical activity in the heart.  · Holter monitor or event monitor test. This test involves wearing a portable device that monitors your heart rate over time.  You may also have other tests, including:  · Blood tests.  · Chest X-ray.  · Echocardiogram. This test involves using sound waves to create images of the heart.  · Angiogram. During this test, dye is injected into your bloodstream, and then X-rays are taken. The dye lets your health care provider see how blood flows through your arteries.  · Exercise stress test. During this test, an ECG is done while you exercise on a  treadmill.  · Cardiac CT scan or cardiac MRI.  How is this treated?  Treatment for this condition depends on the cause. Treatment may include:  · Medicines that slow the heart rate and return it to a normal rhythm (anti-arrhythmics).  · An electric shock (cardioversion) that makes the heart go back to a normal rhythm.  · An electrophysiology study. This procedure can help locate areas of heart tissue that are causing rapid heartbeats.  ? In this procedure, a thin, flexible tube (catheter) is inserted into one of your veins and moved to your heart to evaluate your heart's electrical activity.  ? In some cases, the heart tissue that is causing problems may be killed with radiofrequency energy delivered through the catheter (radiofrequency ablation). This may help your heart keep a normal rhythm.  · An implantable cardioverter defibrillator (ICD). This is a small device that monitors your heartbeat. When it senses an irregular heartbeat, it sends a shock to bring the heartbeat back to normal. The ICD is implanted under the skin in the chest.  · Surgery to improve blood flow to the heart.  · Genetic counseling to check whether your family members are at risk for ventricular tachycardia.  Follow these instructions at home:  Lifestyle  · Do not use any products that contain nicotine or tobacco, such as cigarettes and e-cigarettes. If you need help quitting, ask your health care provider.  · Do not use stimulant drugs, such as cocaine or methamphetamines.  · Maintain a healthy weight.  · Manage stress. Try to do this with relaxation exercises, yoga, quiet time, or meditation.  Eating and drinking  · Eat a healthy diet. This includes plenty of fruits and vegetables, whole grains, lean meats, and low-fat or fat-free dairy products.  · Avoid eating foods that are high in saturated fat, trans fat, sugar, or salt (sodium).  · Ask your health care provider if you may drink alcohol.  ? If alcohol triggers episodes of ventricular  tachycardia, do not drink alcohol.  ? If alcohol does not trigger episodes, limit alcohol intake to no more than 1 drink a day for nonpregnant women and 2 drinks a day for men. One drink equals 12 oz of beer, 5 oz of wine, or 1½ oz of hard liquor.  General instructions  · Take over-the-counter and prescription medicines only as told by your health care provider.  · Exercise regularly. Aim for 150 minutes of moderate exercise or 75 minutes of vigorous exercise per week. Ask your health care provider what exercises are safe for you.  · Keep all follow-up visits as told by your health care provider. This is important.  Contact a health care provider if:  · Your symptoms get worse.  · You develop new symptoms, such as new palpitations.  · You feel depressed.  Get help right away if:  · You have an episode of ventricular tachycardia that lasts 30 seconds or more.  · You have chest pain.  · You have trouble breathing.  These symptoms may represent a serious problem that is an emergency. Do not wait to see if the symptoms will go away. Get medical help right away. Call your local emergency services (911 in the [place] Do not drive yourself to the hospital.  Summary  · Ventricular tachycardia is a fast heartbeat that begins in the lower chambers of the heart. This condition can be life-threatening and should be treated immediately.  · This condition may be treated with medicines, electric shock, radiofrequency energy, surgery, or insertion of an implantable cardioverter defibrillator (ICD).  · Get help right away if you have chest pain, trouble breathing, or ventricular tachycardia symptoms that last more than 30 seconds.  This information is not intended to replace advice given to you by your health care provider. Make sure you discuss any questions you have with your health care provider.  Document Released: 02/08/2018 Document Revised: 11/30/2018 Document Reviewed: 02/08/2018  Elsevier Patient Education © 2020 [employer]      Depression / Suicide Risk    As you are discharged from this [facility] facility, it is important to learn how to keep safe from harming yourself.    Recognize the warning signs:  · Abrupt changes in personality, positive or negative- including increase in energy   · Giving away possessions  · Change in eating patterns- significant weight changes-  positive or negative  · Change in sleeping patterns- unable to sleep or sleeping all the time   · Unwillingness or inability to communicate  · Depression  · Unusual sadness, discouragement and loneliness  · Talk of wanting to die  · Neglect of personal appearance   · Rebelliousness- reckless behavior  · Withdrawal from people/activities they love  · Confusion- inability to concentrate     If you or a loved one observes any of these behaviors or has concerns about self-harm, here's what you can do:  · Talk about it- your feelings and reasons for harming yourself  · Remove any means that you might use to hurt yourself (examples: pills, rope, extension cords, firearm)  · Get professional help from the community (Mental Health, Substance Abuse, psychological counseling)  · Do not be alone:Call your Safe Contact- someone whom you trust who will be there for you.  · Call your local CRISIS HOTLINE 999-4953 or 540-609-7215  · Call your local Children's Mobile Crisis Response Team [place] (691) 091-9460 or www.[employer]  · Call the toll free National Suicide Prevention Hotlines   · National Suicide Prevention Lifeline 324-209-RXOX (3216)  · National Hope Line Network 800-SUICIDE (023-1850)

## 2021-11-23 NOTE — ANESTHESIA POSTPROCEDURE EVALUATION
Patient: Marty Freedman III    Procedure Summary     Date: 11/22/21 Room / Location: [facility] IMAGING - CATH LAB [facility]    Anesthesia Start: 1357 Anesthesia Stop: 1637    Procedure: CL-EP ABLATION VENTRICULAR TACHYCARDIA Diagnosis:       Ventricular tachycardia (HCC)      (See Associated Dx)    Scheduled Providers: Gabe Campbell M.D.; David Lewis M.D. Responsible Provider: David Lewis M.D.    Anesthesia Type: general ASA Status: 3          Final Anesthesia Type: general  Last vitals  BP   Blood Pressure : 122/57    Temp   36.2 °C (97.2 °F)    Pulse   61   Resp   16    SpO2   92 %      Anesthesia Post Evaluation    Patient location during evaluation: PACU  Patient participation: complete - patient participated  Level of consciousness: awake and alert  Pain score: 0    Airway patency: patent  Anesthetic complications: no  Cardiovascular status: hemodynamically stable  Respiratory status: acceptable  Hydration status: euvolemic    PONV: none          No complications documented.     Nurse Pain Score: 0 (NPRS)

## 2021-12-03 ENCOUNTER — HOSPITAL ENCOUNTER (OUTPATIENT)
Dept: RADIOLOGY | Facility: MEDICAL CENTER | Age: 71
End: 2021-12-03
Attending: NURSE PRACTITIONER
Payer: MEDICARE

## 2021-12-03 DIAGNOSIS — I71.40 ABDOMINAL AORTIC ANEURYSM (AAA) WITHOUT RUPTURE (HCC): ICD-10-CM

## 2021-12-03 PROCEDURE — 93978 VASCULAR STUDY: CPT

## 2021-12-03 PROCEDURE — 93978 VASCULAR STUDY: CPT | Mod: 26 | Performed by: INTERNAL MEDICINE

## 2021-12-06 ENCOUNTER — DOCUMENTATION (OUTPATIENT)
Dept: VASCULAR LAB | Facility: MEDICAL CENTER | Age: 71
End: 2021-12-06

## 2021-12-06 SDOH — ECONOMIC STABILITY: INCOME INSECURITY: IN THE LAST 12 MONTHS, WAS THERE A TIME WHEN YOU WERE NOT ABLE TO PAY THE MORTGAGE OR RENT ON TIME?: NO

## 2021-12-06 SDOH — HEALTH STABILITY: PHYSICAL HEALTH: ON AVERAGE, HOW MANY MINUTES DO YOU ENGAGE IN EXERCISE AT THIS LEVEL?: 30 MIN

## 2021-12-06 SDOH — ECONOMIC STABILITY: TRANSPORTATION INSECURITY
IN THE PAST 12 MONTHS, HAS THE LACK OF TRANSPORTATION KEPT YOU FROM MEDICAL APPOINTMENTS OR FROM GETTING MEDICATIONS?: NO

## 2021-12-06 SDOH — ECONOMIC STABILITY: FOOD INSECURITY: WITHIN THE PAST 12 MONTHS, THE FOOD YOU BOUGHT JUST DIDN'T LAST AND YOU DIDN'T HAVE MONEY TO GET MORE.: NEVER TRUE

## 2021-12-06 SDOH — ECONOMIC STABILITY: FOOD INSECURITY: WITHIN THE PAST 12 MONTHS, YOU WORRIED THAT YOUR FOOD WOULD RUN OUT BEFORE YOU GOT MONEY TO BUY MORE.: NEVER TRUE

## 2021-12-06 SDOH — ECONOMIC STABILITY: INCOME INSECURITY: HOW HARD IS IT FOR YOU TO PAY FOR THE VERY BASICS LIKE FOOD, HOUSING, MEDICAL CARE, AND HEATING?: NOT HARD AT ALL

## 2021-12-06 SDOH — HEALTH STABILITY: PHYSICAL HEALTH: ON AVERAGE, HOW MANY DAYS PER WEEK DO YOU ENGAGE IN MODERATE TO STRENUOUS EXERCISE (LIKE A BRISK WALK)?: 4 DAYS

## 2021-12-06 SDOH — ECONOMIC STABILITY: TRANSPORTATION INSECURITY
IN THE PAST 12 MONTHS, HAS LACK OF RELIABLE TRANSPORTATION KEPT YOU FROM MEDICAL APPOINTMENTS, MEETINGS, WORK OR FROM GETTING THINGS NEEDED FOR DAILY LIVING?: NO

## 2021-12-06 SDOH — ECONOMIC STABILITY: TRANSPORTATION INSECURITY
IN THE PAST 12 MONTHS, HAS LACK OF TRANSPORTATION KEPT YOU FROM MEETINGS, WORK, OR FROM GETTING THINGS NEEDED FOR DAILY LIVING?: NO

## 2021-12-06 SDOH — ECONOMIC STABILITY: HOUSING INSECURITY
IN THE LAST 12 MONTHS, WAS THERE A TIME WHEN YOU DID NOT HAVE A STEADY PLACE TO SLEEP OR SLEPT IN A SHELTER (INCLUDING NOW)?: NO

## 2021-12-06 SDOH — ECONOMIC STABILITY: HOUSING INSECURITY

## 2021-12-06 SDOH — HEALTH STABILITY: MENTAL HEALTH
STRESS IS WHEN SOMEONE FEELS TENSE, NERVOUS, ANXIOUS, OR CAN'T SLEEP AT NIGHT BECAUSE THEIR MIND IS TROUBLED. HOW STRESSED ARE YOU?: ONLY A LITTLE

## 2021-12-06 ASSESSMENT — SOCIAL DETERMINANTS OF HEALTH (SDOH)
HOW OFTEN DO YOU ATTEND CHURCH OR RELIGIOUS SERVICES?: PATIENT DECLINED
DO YOU BELONG TO ANY CLUBS OR ORGANIZATIONS SUCH AS CHURCH GROUPS UNIONS, FRATERNAL OR ATHLETIC GROUPS, OR SCHOOL GROUPS?: PATIENT DECLINED
HOW OFTEN DO YOU ATTENT MEETINGS OF THE CLUB OR ORGANIZATION YOU BELONG TO?: PATIENT DECLINED
WITHIN THE PAST 12 MONTHS, YOU WORRIED THAT YOUR FOOD WOULD RUN OUT BEFORE YOU GOT THE MONEY TO BUY MORE: NEVER TRUE
HOW OFTEN DO YOU GET TOGETHER WITH FRIENDS OR RELATIVES?: PATIENT DECLINED
IN A TYPICAL WEEK, HOW MANY TIMES DO YOU TALK ON THE PHONE WITH FAMILY, FRIENDS, OR NEIGHBORS?: PATIENT DECLINED
HOW OFTEN DO YOU ATTEND CHURCH OR RELIGIOUS SERVICES?: PATIENT DECLINED
HOW MANY DRINKS CONTAINING ALCOHOL DO YOU HAVE ON A TYPICAL DAY WHEN YOU ARE DRINKING: 1 OR 2
HOW OFTEN DO YOU GET TOGETHER WITH FRIENDS OR RELATIVES?: PATIENT DECLINED
HOW HARD IS IT FOR YOU TO PAY FOR THE VERY BASICS LIKE FOOD, HOUSING, MEDICAL CARE, AND HEATING?: NOT HARD AT ALL
IN A TYPICAL WEEK, HOW MANY TIMES DO YOU TALK ON THE PHONE WITH FAMILY, FRIENDS, OR NEIGHBORS?: PATIENT DECLINED
HOW OFTEN DO YOU ATTENT MEETINGS OF THE CLUB OR ORGANIZATION YOU BELONG TO?: PATIENT DECLINED
HOW OFTEN DO YOU HAVE A DRINK CONTAINING ALCOHOL: MONTHLY OR LESS
DO YOU BELONG TO ANY CLUBS OR ORGANIZATIONS SUCH AS CHURCH GROUPS UNIONS, FRATERNAL OR ATHLETIC GROUPS, OR SCHOOL GROUPS?: PATIENT DECLINED
HOW OFTEN DO YOU HAVE SIX OR MORE DRINKS ON ONE OCCASION: NEVER

## 2021-12-06 ASSESSMENT — LIFESTYLE VARIABLES
HOW MANY STANDARD DRINKS CONTAINING ALCOHOL DO YOU HAVE ON A TYPICAL DAY: 1 OR 2
HOW OFTEN DO YOU HAVE SIX OR MORE DRINKS ON ONE OCCASION: NEVER
HOW OFTEN DO YOU HAVE A DRINK CONTAINING ALCOHOL: MONTHLY OR LESS

## 2021-12-07 ENCOUNTER — TELEPHONE (OUTPATIENT)
Dept: VASCULAR LAB | Facility: MEDICAL CENTER | Age: 71
End: 2021-12-07

## 2021-12-07 NOTE — TELEPHONE ENCOUNTER
Called and spoke with pt and let him know the below information. Pt was scheduled for Jan 3rd with APRN.     ----- Message from Michael J Bloch, M.D. sent at 12/6/2021  5:55 PM PST -----  Regarding: RE: Ao/iliac dulpex  Teresa - arden aortoiliac as done. Put on saige for repeat aortoilaic duplex in 2 years    LP - let patient know that his aneurysm is still small at 3.4 cm, but a bit bigger than previous. We will keep and eye on it. He is overdue for visit. Pls schedule with apn  ----- Message -----  From: NATALIIA Weiss  Sent: 12/6/2021   2:42 PM PST  To: Michael J Bloch, M.D.  Subject: Ao/iliac dulpex                                  Scan is in the chart. Thanks NATALIIA Weiss

## 2021-12-07 NOTE — PROGRESS NOTES
Aortoiliac dupelx demonstrates modest increase in size of small AAA.   Will plan to repeat in 2 years  APN to update surveillance saige.   Patient overdue for vasc med visit. Will ask ma to schedule.    Michael Bloch, MD  Vascular Care

## 2021-12-09 ENCOUNTER — OFFICE VISIT (OUTPATIENT)
Dept: MEDICAL GROUP | Facility: PHYSICIAN GROUP | Age: 71
End: 2021-12-09
Payer: MEDICARE

## 2021-12-09 VITALS
DIASTOLIC BLOOD PRESSURE: 64 MMHG | BODY MASS INDEX: 27.33 KG/M2 | WEIGHT: 195.2 LBS | HEIGHT: 71 IN | SYSTOLIC BLOOD PRESSURE: 118 MMHG | OXYGEN SATURATION: 96 % | TEMPERATURE: 97.1 F | HEART RATE: 75 BPM | RESPIRATION RATE: 16 BRPM

## 2021-12-09 DIAGNOSIS — Z98.890 H/O CARDIAC RADIOFREQUENCY ABLATION: ICD-10-CM

## 2021-12-09 DIAGNOSIS — I71.40 ABDOMINAL AORTIC ANEURYSM (AAA) WITHOUT RUPTURE (HCC): ICD-10-CM

## 2021-12-09 PROCEDURE — 8041 PR SCP AHA: Performed by: NURSE PRACTITIONER

## 2021-12-09 PROCEDURE — G0439 PPPS, SUBSEQ VISIT: HCPCS | Performed by: NURSE PRACTITIONER

## 2021-12-09 ASSESSMENT — ACTIVITIES OF DAILY LIVING (ADL): BATHING_REQUIRES_ASSISTANCE: 0

## 2021-12-09 ASSESSMENT — PATIENT HEALTH QUESTIONNAIRE - PHQ9: CLINICAL INTERPRETATION OF PHQ2 SCORE: 0

## 2021-12-09 ASSESSMENT — FIBROSIS 4 INDEX: FIB4 SCORE: 2.51

## 2021-12-09 ASSESSMENT — ENCOUNTER SYMPTOMS: GENERAL WELL-BEING: FAIR

## 2021-12-09 NOTE — PROGRESS NOTES
Chief Complaint   Patient presents with   • Annual Exam     awv, pulse 8         HPI:  Marty is a 71 y.o. here for Medicare Annual Wellness Visit    Patient Active Problem List    Diagnosis Date Noted   • H/O cardiac radiofrequency ablation: 11/22/21.  RFA of 3 different VT morphologies from lateral wall scar.  Dr Campbell  11/23/2021   • Current every day smoker 10/21/2021   • Peripheral cyanosis 06/04/2021   • Abdominal aortic aneurysm (AAA) without rupture (HCC) 04/02/2021   • Chronic anticoagulation 04/02/2021   • Hx of CABG 04/02/2021   • Coronary artery disease involving native coronary artery of native heart without angina pectoris 04/02/2021   • Chronic bronchitis (HCC) 03/10/2021   • Decreased hearing of both ears 03/10/2021   • AICD (automatic cardioverter/defibrillator) present 12/09/2020   • Ventricular tachycardia (HCC) 12/09/2020   • Paroxysmal atrial fibrillation (HCC) 12/09/2020   • Hypothyroidism 11/27/2019   • Vitamin D deficiency 11/27/2019   • Elevated hemoglobin A1c 11/08/2019   • Subacute thyroiditis 09/21/2018   • Emphysema of lung (HCC) 09/12/2018   • Lung nodules 09/12/2018   • Aortic aneurysm (HCC) 09/12/2018   • Cardiomyopathy ([facility]) 08/08/2018   • Essential hypertension 07/26/2018   • History of MI (myocardial infarction) 07/26/2018   • Left elbow pain 07/26/2018   • Cigarette nicotine dependence without complication 07/26/2018   • Cough 07/26/2018   • Pacemaker 07/26/2018   • Chronic atrial fibrillation (HCC) 07/26/2018   • Mixed hyperlipidemia 07/26/2018       Current Outpatient Medications   Medication Sig Dispense Refill   • pravastatin (PRAVACHOL) 80 MG tablet Take 1 Tablet by mouth every day. 90 Tablet 3   • levothyroxine (SYNTHROID) 100 MCG Tab Take 1 Tablet by mouth every morning on an empty stomach. 90 Tablet 1   • nitroglycerin (NITROSTAT) 0.4 MG SL Tab Place 0.4 mg under the tongue as needed.     • lisinopril (PRINIVIL) 5 MG Tab Take 5 mg by mouth every day.     • metoprolol  (LOPRESSOR) 25 MG Tab Take 50 mg by mouth 2 times a day.     • amiodarone (CORDARONE) 200 MG Tab Take 200 mg by mouth every day.  0   • albuterol 108 (90 Base) MCG/ACT Aero Soln inhalation aerosol Inhale 2 Puffs by mouth every 6 hours as needed for Shortness of Breath. 1 Inhaler 1   • apixaban (ELIQUIS) 5mg Tab Take 5 mg by mouth 2 Times a Day.     • aspirin EC (ECOTRIN) 81 MG Tablet Delayed Response Take 81 mg by mouth every day.     • magnesium oxide (MAG-OX) 400 MG Tab Take 400 mg by mouth every day.     • digoxin (LANOXIN) 125 MCG Tab Take 125 mcg by mouth every day. For 90 days (Patient not taking: Reported on 12/9/2021)       No current facility-administered medications for this visit.        Patient is taking medications as noted in medication list.  Current supplements as per medication list.     Allergies: Patient has no known allergies.    Current social contact/activities: declined    Is patient current with immunizations? Yes.    He  reports that he has been smoking cigarettes. He has a 22.50 pack-year smoking history. He has never used smokeless tobacco. He reports current alcohol use of about 0.6 oz of alcohol per week. He reports that he does not use drugs.  Ready to quit: Not Answered  Counseling given: Not Answered  Comment: since age 11 with a ~10-12 year break; he has cut down most recently to 4-5 day        DPA/Advanced directive: Patient has Advanced Directive on file.     ROS:    Gait: Uses no  Ostomy: No   Other tubes: No   Amputations: No   Chronic oxygen use No   Last eye exam 2020  Wears hearing aids: No  : Denies any urinary leakage during the last 6 months    Screening:    Depression Screening    Little interest or pleasure in doing things?  0 - not at all  Feeling down, depressed, or hopeless? 0 - not at all  Patient Health Questionnaire Score: 0    If depressive symptoms identified deferred to follow up visit unless specifically addressed in assessment and plan.    Interpretation of  PHQ-9 Total Score   Score Severity   1-4 No Depression   5-9 Mild Depression   10-14 Moderate Depression   15-19 Moderately Severe Depression   20-27 Severe Depression    Screening for Cognitive Impairment    Three Minute Recall (captain, garden, picture)  1/3    Renan clock face with all 12 numbers and set the hands to show 5 past 8.  Yes    If cognitive concerns identified, deferred for follow up unless specifically addressed in assessment and plan.    Fall Risk Assessment    Has the patient had two or more falls in the last year or any fall with injury in the last year?  No  If fall risk identified, deferred for follow up unless specifically addressed in assessment and plan.    Safety Assessment    Throw rugs on floor.  Yes  Handrails on all stairs.  No  Good lighting in all hallways.  Yes  Difficulty hearing.  Yes  Patient counseled about all safety risks that were identified.    Functional Assessment ADLs    Are there any barriers preventing you from cooking for yourself or meeting nutritional needs?  No.    Are there any barriers preventing you from driving safely or obtaining transportation?  No.    Are there any barriers preventing you from using a telephone or calling for help?  No.    Are there any barriers preventing you from shopping?  No.    Are there any barriers preventing you from taking care of your own finances?  No.    Are there any barriers preventing you from managing your medications?  No.    Are there any barriers preventing you from showering, bathing or dressing yourself?  No.    Are you currently engaging in any exercise or physical activity?  Yes.     What is your perception of your health?  Fair.    Health Maintenance Summary          Overdue - Annual Wellness Visit (Every 366 Days) Overdue - never done    No completion history exists for this topic.          Overdue - HEPATITIS C SCREENING (Once) Overdue - never done    No completion history exists for this topic.          Overdue -  Annual Pulmonary Function Test / Spirometry (Yearly) Overdue since 1/10/2020    01/10/2019  AMB PULMONARY FUNCTION TEST/LAB          LUNG CANCER SCREENING (Yearly) Due soon on 12/23/2021 12/23/2020  CT-LUNG CANCER-SCREENING    03/13/2019  CT-CHEST (THORAX) W/O    09/10/2018  CT-LUNG CANCER-SCREENING    02/23/2015  CT-CHEST (THORAX) W/O          IMM DTaP/Tdap/Td Vaccine (2 - Td or Tdap) Next due on 7/26/2028 07/26/2018  Imm Admin: Tdap Vaccine          COLORECTAL CANCER SCREENING (COLONOSCOPY - Every 10 Years) Next due on 10/18/2028    10/18/2018  Surgical Procedure: COLONOSCOPY    12/23/2008  REFERRAL TO GI FOR COLONOSCOPY          IMM ZOSTER VACCINES (Series Information) Completed    01/09/2020  Imm Admin: Zoster Vaccine Recombinant (RZV) (SHINGRIX)    11/08/2019  Imm Admin: Zoster Vaccine Recombinant (RZV) (SHINGRIX)          IMM PNEUMOCOCCAL VACCINE: 65+ Years (Series Information) Completed    10/26/2020  Imm Admin: Pneumococcal polysaccharide vaccine (PPSV-23)    07/26/2018  Imm Admin: Pneumococcal Conjugate Vaccine (Prevnar/PCV-13)          IMM INFLUENZA (Series Information) Completed    10/21/2021  Imm Admin: Influenza Vaccine Adult HD    10/26/2020  Imm Admin: Influenza Vaccine Adult HD    11/08/2019  Imm Admin: Influenza Vaccine Adult HD    09/12/2018  Imm Admin: Influenza Vaccine Adult HD    01/25/2018  Imm Admin: Influenza (IM) Preservative Free - HISTORICAL DATA          COVID-19 Vaccine (Series Information) Completed    11/15/2021  Imm Admin: Moderna SARS-CoV-2 Vaccine    03/12/2021  Imm Admin: Moderna SARS-CoV-2 Vaccine    02/12/2021  Imm Admin: Moderna SARS-CoV-2 Vaccine          IMM HEP B VACCINE (Series Information) Aged Out    No completion history exists for this topic.          IMM MENINGOCOCCAL VACCINE (MCV4) (Series Information) Aged Out    No completion history exists for this topic.                Patient Care Team:  ROSALIA Milligan as PCP - General (Family Medicine)  Shweta  MACARIO Cantor DO (Cardiovascular Disease (Cardiology))  Alfredo Read D.O. (Cardiovascular Disease (Cardiology))  Ryley Gupta Ass't as [facility]     Social History     Tobacco Use   • Smoking status: Light Tobacco Smoker     Packs/day: 0.50     Years: 45.00     Pack years: 22.50     Types: Cigarettes   • Smokeless tobacco: Never Used   • Tobacco comment: since age 11 with a ~10-12 year break; he has cut down most recently to 4-5 day   Vaping Use   • Vaping Use: Never used   Substance Use Topics   • Alcohol use: Yes     Alcohol/week: 0.6 oz     Types: 1 Cans of beer per week     Comment: monthly   • Drug use: No     Family History   Problem Relation Age of Onset   • Heart Attack Mother    • Heart Disease Mother    • Hypertension Mother    • Cancer Father    • Heart Attack Father         s/p CABG   • Heart Disease Father         AAA   • Hypertension Father    • Heart Attack Paternal Grandmother    • Heart Attack Paternal Grandfather      He  has a past medical history of Anesthesia, Arthritis, Atrial fibrillation (HCC), Chickenpox, Chronic bronchitis (HCC), Dental disorder, Disorder of thyroid, Emphysema of lung (HCC), French measles, Hemorrhagic disorder (HCC), High cholesterol, Hypertension, Influenza, Mumps, Myocardial infarct (HCC) (87, ?89, 2007), Other and unspecified angina pectoris (10/2016), Pacemaker (10/20/2016), Pain, and PONV (postoperative nausea and vomiting). He also has no past medical history of Acute nasopharyngitis, Blood clotting disorder (HCC), Bowel habit changes, Breath shortness, Cancer (HCC), Carcinoma in situ of respiratory system, Congestive heart failure (HCC), Continuous ambulatory peritoneal dialysis status (HCC), Coughing blood, Diabetes (HCC), Dialysis patient (HCC), Glaucoma, Gynecological disorder, Heart burn, Heart murmur, Heart valve disease, Hepatitis A, Hepatitis B, Hepatitis C, Hiatus hernia syndrome, Indigestion, Jaundice, Pneumonia,  "Pregnant, Psychiatric problem, Renal disorder, Rheumatic fever, Seizure (HCC), Sleep apnea, Snoring, Stroke (HCC), Tuberculosis, Urinary bladder disorder, or Urinary incontinence.   Past Surgical History:   Procedure Laterality Date   • COLONOSCOPY  10/18/2018    Procedure: COLONOSCOPY - W/POSS BX, DILATION, POLYPECTOMY, CONTROL OF HEMORRHAGE;  Surgeon: Pineda Horvath M.D.;  Location: SURGERY [facility];  Service: EUS   • PACEMAKER INSERTION  10/20/2016   • INGUINAL HERNIA REPAIR BILATERAL  3/2/2015    Performed by Jack Tobias M.D. at SURGERY SAME DAY [facility]   • COLONOSCOPY  2008   • MULTIPLE CORONARY ARTERY BYPASS  1991    stents (X 3 placement= stents total)   • APPENDECTOMY CHILD     • OTHER ORTHOPEDIC SURGERY      knee arthoscopy, bilaterally (\"early 80's\")   • TONSILLECTOMY  as a child           Exam:     /64   Pulse 75   Temp 36.2 °C (97.1 °F) (Temporal)   Resp 16   Ht 1.803 m (5' 11\")   Wt 88.5 kg (195 lb 3.2 oz)   SpO2 96%  Body mass index is 27.22 kg/m².    Hearing good.    Dentition upper and lower dentures  Alert, oriented in no acute distress.  Eye contact is good, speech goal directed, affect calm    Assessment and Plan. The following treatment and monitoring plan is recommended:    No diagnosis found.      Services suggested: No services needed at this time  Health Care Screening recommendations as per orders if indicated.  Referrals offered: PT/OT/Nutrition counseling/Behavioral Health/Smoking cessation as per orders if indicated.    Discussion today about general wellness and lifestyle habits:    · Prevent falls and reduce trip hazards; Cautioned about securing or removing rugs.  · Have a working fire alarm and carbon monoxide detector;   · Engage in regular physical activity and social activities       Follow-up: No follow-ups on file.     Annual Health Assessment Questions:    1.  Are you currently engaging in any exercise or physical activity? Yes    2.  How would " you describe your mood or emotional well-being today? good    3.  Have you had any falls in the last year? No    4.  Have you noticed any problems with your balance or had difficulty walking? No    5.  In the last six months have you experienced any leakage of urine? No    6. DPA/Advanced Directive: Patient has Advanced Directive on file.

## 2021-12-10 ENCOUNTER — PATIENT MESSAGE (OUTPATIENT)
Dept: HEALTH INFORMATION MANAGEMENT | Facility: OTHER | Age: 71
End: 2021-12-10

## 2021-12-22 ENCOUNTER — OFFICE VISIT (OUTPATIENT)
Dept: CARDIOLOGY | Facility: MEDICAL CENTER | Age: 71
End: 2021-12-22
Payer: MEDICARE

## 2021-12-22 ENCOUNTER — TELEPHONE (OUTPATIENT)
Dept: HEALTH INFORMATION MANAGEMENT | Facility: OTHER | Age: 71
End: 2021-12-22

## 2021-12-22 VITALS
RESPIRATION RATE: 14 BRPM | WEIGHT: 192.6 LBS | BODY MASS INDEX: 26.96 KG/M2 | DIASTOLIC BLOOD PRESSURE: 72 MMHG | OXYGEN SATURATION: 96 % | SYSTOLIC BLOOD PRESSURE: 116 MMHG | HEIGHT: 71 IN | HEART RATE: 69 BPM

## 2021-12-22 DIAGNOSIS — I48.0 PAROXYSMAL ATRIAL FIBRILLATION (HCC): ICD-10-CM

## 2021-12-22 DIAGNOSIS — I42.9 CARDIOMYOPATHY, UNSPECIFIED TYPE (HCC): ICD-10-CM

## 2021-12-22 DIAGNOSIS — Z95.810 AICD (AUTOMATIC CARDIOVERTER/DEFIBRILLATOR) PRESENT: ICD-10-CM

## 2021-12-22 PROCEDURE — 99215 OFFICE O/P EST HI 40 MIN: CPT | Mod: 25 | Performed by: INTERNAL MEDICINE

## 2021-12-22 PROCEDURE — 93283 PRGRMG EVAL IMPLANTABLE DFB: CPT | Performed by: INTERNAL MEDICINE

## 2021-12-22 ASSESSMENT — FIBROSIS 4 INDEX: FIB4 SCORE: 2.51

## 2021-12-22 NOTE — PROGRESS NOTES
Arrhythmia Clinic Note (Established patient)    DOS: 12/22/2021    Chief complaint/Reason for consult: VT    Interval History: 70 y/o M with h/o VT and ICM, CAD s/p CABG, EF 40%, ICD in situ, pAF on Eliquis. S/p VT ablation 1 month ago. No recurrence of VT thus far. Remains on amiodarone.    ROS (+ highlighted in bold):  Constitutional: Fevers/chills/fatigue/weightloss  HEENT: Blurry vision/eye pain/sore throat/hearing loss  Respiratory: Shortness of breath/cough  Cardiovascular: Chest pain/palpitations/edema/orthopnea/syncope  GI: Nausea/vomitting/diarrhea  MSK: Arthralgias/myagias/muscle weakness  Skin: Rash/sores  Neurological: Numbness/tremors/vertigo  Endocrine: Excessive thirst/polyuria/cold intolerance/heat intolerance  Psych: Depression/anxiety    Past Medical History:   Diagnosis Date   • Anesthesia     ponv   • Arthritis     shoulder   • Atrial fibrillation (HCC)    • Chickenpox    • Chronic bronchitis (HCC)    • Dental disorder     Full set dentures   • Disorder of thyroid     Hypothyroid   • Emphysema of lung (HCC)    • [place] measles    • Hemorrhagic disorder (HCC)     On Blood thinners   • High cholesterol    • Hypertension    • Influenza    • Mumps    • Myocardial infarct (HCC) 87, ?89, 2007    x 3   • Other and unspecified angina pectoris 10/2016    Defibulator   • Pacemaker 10/20/2016    Pacemaker/Defib   • Pain     shoulder   • PONV (postoperative nausea and vomiting)        Past Surgical History:   Procedure Laterality Date   • COLONOSCOPY  10/18/2018    Procedure: COLONOSCOPY - W/POSS BX, DILATION, POLYPECTOMY, CONTROL OF HEMORRHAGE;  Surgeon: Pineda Horvath M.D.;  Location: SURGERY [facility];  Service: EUS   • PACEMAKER INSERTION  10/20/2016   • INGUINAL HERNIA REPAIR BILATERAL  3/2/2015    Performed by Jack Tobias M.D. at SURGERY SAME DAY [facility]   • COLONOSCOPY  2008   • MULTIPLE CORONARY ARTERY BYPASS  1991    stents (X 3 placement= stents total)   • APPENDECTOMY CHILD    "  • OTHER ORTHOPEDIC SURGERY      knee arthoscopy, bilaterally (\"early 80's\")   • TONSILLECTOMY  as a child       Social History     Socioeconomic History   • Marital status:      Spouse name: Not on file   • Number of children: Not on file   • Years of education: Not on file   • Highest education level: Not on file   Occupational History   • Not on file   Tobacco Use   • Smoking status: Light Tobacco Smoker     Packs/day: 0.50     Years: 45.00     Pack years: 22.50     Types: Cigarettes   • Smokeless tobacco: Never Used   • Tobacco comment: Working on quitting    Vaping Use   • Vaping Use: Never used   Substance and Sexual Activity   • Alcohol use: Yes     Alcohol/week: 0.6 oz     Types: 1 Cans of beer per week     Comment: monthly   • Drug use: No   • Sexual activity: Not Currently   Other Topics Concern   • Not on file   Social History Narrative   • Not on file     Social Determinants of Health     Financial Resource Strain: Low Risk    • Difficulty of Paying Living Expenses: Not hard at all   Food Insecurity: No Food Insecurity   • Worried About Running Out of Food in the Last Year: Never true   • Ran Out of Food in the Last Year: Never true   Transportation Needs: No Transportation Needs   • Lack of Transportation (Medical): No   • Lack of Transportation (Non-Medical): No   Physical Activity: Insufficiently Active   • Days of Exercise per Week: 4 days   • Minutes of Exercise per Session: 30 min   Stress: No Stress Concern Present   • Feeling of Stress : Only a little   Social Connections: Unknown   • Frequency of Communication with Friends and Family: Patient refused   • Frequency of Social Gatherings with Friends and Family: Patient refused   • Attends Restorationist Services: Patient refused   • Active Member of Clubs or Organizations: Patient refused   • Attends Club or Organization Meetings: Patient refused   • Marital Status:    Intimate Partner Violence:    • Fear of Current or Ex-Partner: Not on " "file   • Emotionally Abused: Not on file   • Physically Abused: Not on file   • Sexually Abused: Not on file   Housing Stability: Unknown   • Unable to Pay for Housing in the Last Year: No   • Number of Places Lived in the Last Year: Not on file   • Unstable Housing in the Last Year: No       Family History   Problem Relation Age of Onset   • Heart Attack Mother    • Heart Disease Mother    • Hypertension Mother    • Cancer Father    • Heart Attack Father         s/p CABG   • Heart Disease Father         AAA   • Hypertension Father    • Heart Attack Paternal Grandmother    • Heart Attack Paternal Grandfather        No Known Allergies    Current Outpatient Medications   Medication Sig Dispense Refill   • pravastatin (PRAVACHOL) 80 MG tablet Take 1 Tablet by mouth every day. 90 Tablet 3   • levothyroxine (SYNTHROID) 100 MCG Tab Take 1 Tablet by mouth every morning on an empty stomach. 90 Tablet 1   • nitroglycerin (NITROSTAT) 0.4 MG SL Tab Place 0.4 mg under the tongue as needed.     • lisinopril (PRINIVIL) 5 MG Tab Take 5 mg by mouth every day.     • metoprolol (LOPRESSOR) 25 MG Tab Take 50 mg by mouth 2 times a day.     • digoxin (LANOXIN) 125 MCG Tab Take 125 mcg by mouth every day. For 90 days     • albuterol 108 (90 Base) MCG/ACT Aero Soln inhalation aerosol Inhale 2 Puffs by mouth every 6 hours as needed for Shortness of Breath. 1 Inhaler 1   • apixaban (ELIQUIS) 5mg Tab Take 5 mg by mouth 2 Times a Day.     • aspirin EC (ECOTRIN) 81 MG Tablet Delayed Response Take 81 mg by mouth every day.     • magnesium oxide (MAG-OX) 400 MG Tab Take 400 mg by mouth every day.       No current facility-administered medications for this visit.       Physical Exam:  Vitals:    12/22/21 1122   BP: 116/72   BP Location: Left arm   Patient Position: Sitting   BP Cuff Size: Adult   Pulse: 69   Resp: 14   SpO2: 96%   Weight: 87.4 kg (192 lb 9.6 oz)   Height: 1.803 m (5' 11\")     General appearance: NAD, conversant   Eyes: anicteric " sclerae, moist conjunctivae; no lid-lag; PERRLA  HENT: Atraumatic; oropharynx clear with moist mucous membranes and no mucosal ulcerations; normal hard and soft palate  Neck: Trachea midline; FROM, supple, no thyromegaly or lymphadenopathy  Lungs: CTA, with normal respiratory effort and no intercostal retractions  CV: RRR, no MRGs, no JVD  Abdomen: Soft, non-tender; no masses or HSM  Extremities: No peripheral edema or extremity lymphadenopathy  Skin: Normal temperature, turgor and texture; no rash, ulcers or subcutaneous nodules  Psych: Appropriate affect, alert and oriented to person, place and time    Data:  Lipids:   Lab Results   Component Value Date/Time    CHOLSTRLTOT 204 (H) 10/20/2021 07:36 AM    TRIGLYCERIDE 319 (H) 10/20/2021 07:36 AM    HDL 33 (A) 10/20/2021 07:36 AM     (H) 10/20/2021 07:36 AM        BMP:  Lab Results   Component Value Date/Time    SODIUM 137 11/17/2021 1028    POTASSIUM 4.5 11/17/2021 1028    CHLORIDE 102 11/17/2021 1028    CO2 23 11/17/2021 1028    GLUCOSE 91 11/17/2021 1028    BUN 16 11/17/2021 1028    CREATININE 0.93 11/17/2021 1028    CALCIUM 9.1 11/17/2021 1028    ANION 12.0 11/17/2021 1028        TSH:   Lab Results   Component Value Date/Time    TSHULTRASEN 7.040 (H) 10/20/2021 0736        THYROXINE (T4):   No results found for: RANJIT     CBC:   Lab Results   Component Value Date/Time    WBC 5.5 11/17/2021 10:28 AM    RBC 4.10 (L) 11/17/2021 10:28 AM    HEMOGLOBIN 15.0 11/17/2021 10:28 AM    HEMATOCRIT 44.3 11/17/2021 10:28 AM    .0 (H) 11/17/2021 10:28 AM    MCH 36.6 (H) 11/17/2021 10:28 AM    MCHC 33.9 11/17/2021 10:28 AM    RDW 50.6 (H) 11/17/2021 10:28 AM    PLATELETCT 148 (L) 11/17/2021 10:28 AM    MPV 12.0 11/17/2021 10:28 AM    NEUTSPOLYS 61.70 11/17/2021 10:28 AM    LYMPHOCYTES 24.80 11/17/2021 10:28 AM    MONOCYTES 11.20 11/17/2021 10:28 AM    EOSINOPHILS 1.40 11/17/2021 10:28 AM    BASOPHILS 0.50 11/17/2021 10:28 AM    IMMGRAN 0.40 11/17/2021 10:28 AM     NRBC 0.40 11/17/2021 10:28 AM    NEUTS 3.40 11/17/2021 10:28 AM    LYMPHS 1.37 11/17/2021 10:28 AM    MONOS 0.62 11/17/2021 10:28 AM    EOS 0.08 11/17/2021 10:28 AM    BASO 0.03 11/17/2021 10:28 AM    IMMGRANAB 0.02 11/17/2021 10:28 AM    NRBCAB 0.02 11/17/2021 10:28 AM        CBC w/o DIFF  Lab Results   Component Value Date/Time    WBC 5.5 11/17/2021 10:28 AM    RBC 4.10 (L) 11/17/2021 10:28 AM    HEMOGLOBIN 15.0 11/17/2021 10:28 AM    .0 (H) 11/17/2021 10:28 AM    MCH 36.6 (H) 11/17/2021 10:28 AM    MCHC 33.9 11/17/2021 10:28 AM    RDW 50.6 (H) 11/17/2021 10:28 AM    MPV 12.0 11/17/2021 10:28 AM       Prior echo/stress reviewed: EF 40%    EKG interpreted by me: Atrial pacing    Device interrogation interpreted by me: No episodes of VT    Impression/Plan:  1. Paroxysmal atrial fibrillation (HCC)  EKG     1. VT s/p ablation  2. ICM EF 40%  3. CAD s/p CABG  4. pAF  5. ICD in situ  6. Amiodarone use, high risk medication use    - Discussed treatment options at this point. Given prior thyroid issues I favor stopping amiodarone at this point. If recurrent VT could perform redo VT ablation with impella assist, vs sotalol/Tikosyn  - Continue statin, BB for CAD  - Continue Eliquis for pAF    He will follow up in [place], see me again if recurrent VT    Gabe Campbell MD  Cardiac Electrophysiology

## 2022-01-04 LAB — EKG IMPRESSION: NORMAL

## 2022-01-04 PROCEDURE — 93000 ELECTROCARDIOGRAM COMPLETE: CPT | Performed by: INTERNAL MEDICINE

## 2022-01-11 ENCOUNTER — HOSPITAL ENCOUNTER (OUTPATIENT)
Facility: MEDICAL CENTER | Age: 72
End: 2022-01-11
Attending: NURSE PRACTITIONER
Payer: MEDICARE

## 2022-01-11 DIAGNOSIS — E03.9 HYPOTHYROIDISM, UNSPECIFIED TYPE: ICD-10-CM

## 2022-01-11 DIAGNOSIS — E78.2 MIXED HYPERLIPIDEMIA: ICD-10-CM

## 2022-01-11 LAB
T4 FREE SERPL-MCNC: 1.78 NG/DL (ref 0.93–1.7)
TSH SERPL DL<=0.005 MIU/L-ACNC: 0.25 UIU/ML (ref 0.38–5.33)

## 2022-01-11 PROCEDURE — 84443 ASSAY THYROID STIM HORMONE: CPT

## 2022-01-11 PROCEDURE — 84439 ASSAY OF FREE THYROXINE: CPT

## 2022-01-11 PROCEDURE — 80061 LIPID PANEL: CPT

## 2022-01-11 PROCEDURE — 36415 COLL VENOUS BLD VENIPUNCTURE: CPT

## 2022-01-12 ENCOUNTER — OFFICE VISIT (OUTPATIENT)
Dept: MEDICAL GROUP | Facility: PHYSICIAN GROUP | Age: 72
End: 2022-01-12
Payer: MEDICARE

## 2022-01-12 VITALS
DIASTOLIC BLOOD PRESSURE: 78 MMHG | BODY MASS INDEX: 27.33 KG/M2 | HEART RATE: 63 BPM | SYSTOLIC BLOOD PRESSURE: 120 MMHG | TEMPERATURE: 97.1 F | HEIGHT: 71 IN | OXYGEN SATURATION: 95 % | WEIGHT: 195.2 LBS

## 2022-01-12 DIAGNOSIS — E78.2 MIXED HYPERLIPIDEMIA: ICD-10-CM

## 2022-01-12 DIAGNOSIS — F17.200 CURRENT EVERY DAY SMOKER: ICD-10-CM

## 2022-01-12 DIAGNOSIS — E03.9 HYPOTHYROIDISM, UNSPECIFIED TYPE: ICD-10-CM

## 2022-01-12 LAB
CHOLEST SERPL-MCNC: 183 MG/DL (ref 100–199)
FASTING STATUS PATIENT QL REPORTED: NORMAL
HDLC SERPL-MCNC: 34 MG/DL
LDLC SERPL CALC-MCNC: 101 MG/DL
TRIGL SERPL-MCNC: 240 MG/DL (ref 0–149)

## 2022-01-12 PROCEDURE — 99214 OFFICE O/P EST MOD 30 MIN: CPT | Performed by: NURSE PRACTITIONER

## 2022-01-12 RX ORDER — LEVOTHYROXINE SODIUM 88 UG/1
88 TABLET ORAL
Qty: 90 TABLET | Refills: 0 | Status: SHIPPED | OUTPATIENT
Start: 2022-01-12 | End: 2022-04-25

## 2022-01-12 ASSESSMENT — FIBROSIS 4 INDEX: FIB4 SCORE: 2.51

## 2022-01-12 ASSESSMENT — PATIENT HEALTH QUESTIONNAIRE - PHQ9: CLINICAL INTERPRETATION OF PHQ2 SCORE: 0

## 2022-01-12 NOTE — PROGRESS NOTES
Chief Complaint   Patient presents with   • Lab Results       HISTORY OF PRESENT ILLNESS: Patient is a 71 y.o. male, established patient who presents today to discuss medical problems as listed below:    Health Maintenance:  COMPLETED     Hypothyroidism  Results for KALIE SANDERS III (MRN [id number]) as of 1/12/2022 09:47   Ref. Range 1/11/2022 09:03   TSH Latest Ref Range: 0.380 - 5.330 uIU/mL 0.250 (L)   Free T-4 Latest Ref Range: 0.93 - 1.70 ng/dL 1.78 (H)   Patient denies hyposymptoms, no CP, SOB or palpitations.    Current every day smoker  50 pack years  8 cigs currently  Patient counseled      Patient Active Problem List    Diagnosis Date Noted   • H/O cardiac radiofrequency ablation: 11/22/21.  RFA of 3 different VT morphologies from lateral wall scar.  Dr Campbell  11/23/2021   • Current every day smoker 10/21/2021   • Peripheral cyanosis 06/04/2021   • Abdominal aortic aneurysm (AAA) without rupture (HCC) 04/02/2021   • Chronic anticoagulation 04/02/2021   • Hx of CABG 04/02/2021   • Coronary artery disease involving native coronary artery of native heart without angina pectoris 04/02/2021   • Chronic bronchitis (HCC) 03/10/2021   • Decreased hearing of both ears 03/10/2021   • AICD (automatic cardioverter/defibrillator) present 12/09/2020   • Ventricular tachycardia (HCC) 12/09/2020   • Paroxysmal atrial fibrillation ([facility]) 12/09/2020   • Hypothyroidism 11/27/2019   • Vitamin D deficiency 11/27/2019   • Elevated hemoglobin A1c 11/08/2019   • Subacute thyroiditis 09/21/2018   • Emphysema of lung (HCC) 09/12/2018   • Lung nodules 09/12/2018   • Aortic aneurysm (HCC) 09/12/2018   • Cardiomyopathy ([facility]) 08/08/2018   • Essential hypertension 07/26/2018   • History of MI (myocardial infarction) 07/26/2018   • Left elbow pain 07/26/2018   • Cigarette nicotine dependence without complication 07/26/2018   • Cough 07/26/2018   • Pacemaker 07/26/2018   • Chronic atrial fibrillation (HCC) 07/26/2018   • Mixed hyperlipidemia  07/26/2018        Allergies: Patient has no known allergies.    Current Outpatient Medications   Medication Sig Dispense Refill   • levothyroxine (SYNTHROID) 88 MCG Tab Take 1 Tablet by mouth every morning on an empty stomach. 90 Tablet 0   • pravastatin (PRAVACHOL) 80 MG tablet Take 1 Tablet by mouth every day. 90 Tablet 3   • nitroglycerin (NITROSTAT) 0.4 MG SL Tab Place 0.4 mg under the tongue as needed.     • lisinopril (PRINIVIL) 5 MG Tab Take 5 mg by mouth every day.     • metoprolol (LOPRESSOR) 25 MG Tab Take 50 mg by mouth 2 times a day.     • digoxin (LANOXIN) 125 MCG Tab Take 125 mcg by mouth every day. For 90 days     • albuterol 108 (90 Base) MCG/ACT Aero Soln inhalation aerosol Inhale 2 Puffs by mouth every 6 hours as needed for Shortness of Breath. 1 Inhaler 1   • apixaban (ELIQUIS) 5mg Tab Take 5 mg by mouth 2 Times a Day.     • aspirin EC (ECOTRIN) 81 MG Tablet Delayed Response Take 81 mg by mouth every day.     • magnesium oxide (MAG-OX) 400 MG Tab Take 400 mg by mouth every day.       No current facility-administered medications for this visit.       Social History     Tobacco Use   • Smoking status: Light Tobacco Smoker     Packs/day: 0.50     Years: 45.00     Pack years: 22.50     Types: Cigarettes   • Smokeless tobacco: Never Used   • Tobacco comment: Working on quitting    Vaping Use   • Vaping Use: Never used   Substance Use Topics   • Alcohol use: Yes     Alcohol/week: 0.6 oz     Types: 1 Cans of beer per week     Comment: monthly   • Drug use: No     Social History     Social History Narrative   • Not on file       Family History   Problem Relation Age of Onset   • Heart Attack Mother    • Heart Disease Mother    • Hypertension Mother    • Cancer Father    • Heart Attack Father         s/p CABG   • Heart Disease Father         AAA   • Hypertension Father    • Heart Attack Paternal Grandmother    • Heart Attack Paternal Grandfather        Allergies, past medical history, past surgical  "history, family history, social history reviewed and updated.    Review of Systems:     - Constitutional: Negative for fever, chills, unexpected weight change, and fatigue/generalized weakness.    - Respiratory: Negative for cough, sputum production, chest congestion, dyspnea, wheezing, and crackles.      - Cardiovascular: Negative for chest pain, palpitations, orthopnea, and bilateral lower extremity edema.     - Psychiatric/Behavioral: Negative for depression, suicidal/homicidal ideation and memory loss.      All other systems reviewed and are negative    Exam:    /78 (BP Location: Right arm, Patient Position: Sitting, BP Cuff Size: Adult)   Pulse 63   Temp 36.2 °C (97.1 °F) (Temporal)   Ht 1.803 m (5' 11\")   Wt 88.5 kg (195 lb 3.2 oz)   SpO2 95%   BMI 27.22 kg/m²  Body mass index is 27.22 kg/m².    Physical Exam:  Constitutional: Well-developed and well-nourished. Not diaphoretic. No distress.   Cardiovascular: Regular rate and rhythm, S1 and S2 without murmur, rubs, or gallops.    Chest: Effort normal. Clear to auscultation throughout. No adventitious sounds.   Neurological: Alert and oriented x 3.   Psychiatric:  Behavior, mood, and affect are appropriate.  MA/nursing note and vitals reviewed.    LABS: 1/11/22  results reviewed and discussed with the patient, questions answered.    Assessment/Plan:  1. Hypothyroidism, unspecified type  Uncontrolled, stable.  Will decrease levothyroxine to 88 mcg.  We will recheck labs in 2 months.  - levothyroxine (SYNTHROID) 88 MCG Tab; Take 1 Tablet by mouth every morning on an empty stomach.  Dispense: 90 Tablet; Refill: 0  - FREE THYROXINE; Future  - TSH; Future    2. Current every day smoker  Patient counseled  - PULMONARY FUNCTION TESTS -Test requested: Complete Pulmonary Function Test; Future  - REFERRAL TO LUNG CANCER SCREENING PROGRAM    3. Mixed hyperlipidemia  Uncontrolled.  Needs to recheck labs.  Continue pravastatin, diet high in fiber low in simple " carbohydrates and saturated fats  - Lipid Profile; Future       Discussed with patient possible alternative diagnoses, pt is to take all medications as prescribed. If symptoms persist FU w/PCP, if symptoms worsen go to emergency room. If experiencing any side effects from prescribed medications reports to the office immediately or go to emergency room.  Reviewed indication, dosage, usage and potential adverse effects of prescribed medications. Reviewed risks and benefits of treatment plan. Patient verbalizes understanding of all instruction and verbally agrees to plan.    No follow-ups on file. 3 mos

## 2022-01-12 NOTE — ASSESSMENT & PLAN NOTE
Results for KALIE SANDERS III (MRN [id number]) as of 1/12/2022 09:47   Ref. Range 1/11/2022 09:03   TSH Latest Ref Range: 0.380 - 5.330 uIU/mL 0.250 (L)   Free T-4 Latest Ref Range: 0.93 - 1.70 ng/dL 1.78 (H)   Patient denies hyposymptoms, no CP, SOB or palpitations.

## 2022-01-19 ENCOUNTER — TELEPHONE (OUTPATIENT)
Dept: HEMATOLOGY ONCOLOGY | Facility: MEDICAL CENTER | Age: 72
End: 2022-01-19

## 2022-01-19 NOTE — TELEPHONE ENCOUNTER
SDM appt completed 8/16/2018  Our records indicate that your patient is due for his ANNUAL LDCT. Patients do not need to repeat the Shared Decision Making (SDM) with our Lung Cancer Screening program APRN each year. Please place an order for the LDCT by selecting the LUNG CANCER SCREENING CT, when asked if the patient has completed a Shared Decision Making Appointment select yes. You will need to verify eligibility each year to ensure your patient meets criteria.    Eligibility for lung cancer screening program is based on best practice guidelines approved through the [facility]:  Age 55-77 yrs of age   30 pack year hx of smoking, or greater   Current smoker or if quit, must have quit within last 15 yrs   Asymptomatic (no signs or symptoms of lung cancer)    No previous history of lung cancer   No Chest CT within past 12 mos.

## 2022-04-24 DIAGNOSIS — E03.9 HYPOTHYROIDISM, UNSPECIFIED TYPE: ICD-10-CM

## 2022-04-26 RX ORDER — LEVOTHYROXINE SODIUM 88 UG/1
TABLET ORAL
Qty: 90 TABLET | Refills: 0 | Status: SHIPPED | OUTPATIENT
Start: 2022-04-26 | End: 2022-04-29 | Stop reason: SDUPTHER

## 2022-04-29 ENCOUNTER — OFFICE VISIT (OUTPATIENT)
Dept: MEDICAL GROUP | Facility: PHYSICIAN GROUP | Age: 72
End: 2022-04-29
Payer: MEDICARE

## 2022-04-29 VITALS
HEART RATE: 67 BPM | TEMPERATURE: 97 F | BODY MASS INDEX: 27.66 KG/M2 | OXYGEN SATURATION: 95 % | DIASTOLIC BLOOD PRESSURE: 60 MMHG | RESPIRATION RATE: 14 BRPM | HEIGHT: 71 IN | WEIGHT: 197.6 LBS | SYSTOLIC BLOOD PRESSURE: 120 MMHG

## 2022-04-29 DIAGNOSIS — E03.9 HYPOTHYROIDISM, UNSPECIFIED TYPE: ICD-10-CM

## 2022-04-29 DIAGNOSIS — F17.210 CIGARETTE NICOTINE DEPENDENCE WITHOUT COMPLICATION: ICD-10-CM

## 2022-04-29 DIAGNOSIS — L85.3 XEROSIS OF SKIN: ICD-10-CM

## 2022-04-29 DIAGNOSIS — E78.2 MIXED HYPERLIPIDEMIA: ICD-10-CM

## 2022-04-29 PROCEDURE — 99214 OFFICE O/P EST MOD 30 MIN: CPT | Performed by: NURSE PRACTITIONER

## 2022-04-29 RX ORDER — METOPROLOL SUCCINATE 50 MG/1
TABLET, EXTENDED RELEASE ORAL
COMMUNITY
Start: 2022-04-24 | End: 2022-08-12

## 2022-04-29 RX ORDER — LEVOTHYROXINE SODIUM 88 UG/1
88 TABLET ORAL
Qty: 90 TABLET | Refills: 0 | Status: SHIPPED | OUTPATIENT
Start: 2022-04-29 | End: 2022-07-29 | Stop reason: SDUPTHER

## 2022-04-29 RX ORDER — AMMONIUM LACTATE 12 G/100G
1 LOTION TOPICAL 3 TIMES DAILY PRN
Qty: 225 G | Refills: 1 | Status: SHIPPED | OUTPATIENT
Start: 2022-04-29

## 2022-04-29 RX ORDER — PRAVASTATIN SODIUM 80 MG/1
80 TABLET ORAL DAILY
Qty: 90 TABLET | Refills: 1 | Status: SHIPPED | OUTPATIENT
Start: 2022-04-29 | End: 2022-08-12

## 2022-04-29 RX ORDER — METOPROLOL SUCCINATE 50 MG/1
50 TABLET, EXTENDED RELEASE ORAL
COMMUNITY
Start: 2022-03-07 | End: 2022-08-12

## 2022-04-29 ASSESSMENT — FIBROSIS 4 INDEX: FIB4 SCORE: 2.55

## 2022-04-29 NOTE — ASSESSMENT & PLAN NOTE
Chronic problem.  Labs from 4/28/2022 with TSH at 15.35 and T4 at 0.92 patient denies any hypothyroid symptoms.  His cholesterol with high triglycerides and LDL.  He ran out of his thyroid medicine for 2 weeks, currently on 88 mcg.

## 2022-04-29 NOTE — ASSESSMENT & PLAN NOTE
Labs from 4/28/2022 with consistently high cholesterol, total cholesterol at 190, triglycerides at 255, LDL at 118 and HDL at 37.  Patient continues to smoke, however slowly weaning himself off, currently at 6 cigarettes/day.  On pravastatin 80 mg daily  His recent cholesterol is not well controlled but the TSH is 15.35.  Patient is established with cardiology, Dr. Read at [facility].

## 2022-04-29 NOTE — PROGRESS NOTES
Chief Complaint   Patient presents with   • Lab Results       HISTORY OF PRESENT ILLNESS: Patient is a 72 y.o. male, established patient who presents today to discuss medical problems as listed below:    Health Maintenance:  COMPLETED     Hypothyroidism  Chronic problem.  Labs from 4/28/2022 with TSH at 15.35 and T4 at 0.92 patient denies any hypothyroid symptoms.  His cholesterol with high triglycerides and LDL.  He ran out of his thyroid medicine for 2 weeks, currently on 88 mcg.    Mixed hyperlipidemia  Labs from 4/28/2022 with consistently high cholesterol, total cholesterol at 190, triglycerides at 255, LDL at 118 and HDL at 37.  Patient continues to smoke, however slowly weaning himself off, currently at 6 cigarettes/day.  On pravastatin 80 mg daily  His recent cholesterol is not well controlled but the TSH is 15.35.  Patient is established with cardiology, Dr. Read at [facility].    Xerosis of skin  Chronic intermittent problem.  Generalized dry skin, previously prescribed Lac-Hydrin, would like a refill today      Patient Active Problem List    Diagnosis Date Noted   • Xerosis of skin 04/29/2022   • H/O cardiac radiofrequency ablation: 11/22/21.  RFA of 3 different VT morphologies from lateral wall scar.  Dr Campbell  11/23/2021   • Current every day smoker 10/21/2021   • Peripheral cyanosis 06/04/2021   • Abdominal aortic aneurysm (AAA) without rupture (HCC) 04/02/2021   • Chronic anticoagulation 04/02/2021   • Hx of CABG 04/02/2021   • Coronary artery disease involving native coronary artery of native heart without angina pectoris 04/02/2021   • Chronic bronchitis (HCC) 03/10/2021   • Decreased hearing of both ears 03/10/2021   • AICD (automatic cardioverter/defibrillator) present 12/09/2020   • Ventricular tachycardia (HCC) 12/09/2020   • Paroxysmal atrial fibrillation (HCC) 12/09/2020   • Hypothyroidism 11/27/2019   • Vitamin D deficiency 11/27/2019   • Elevated hemoglobin A1c 11/08/2019   • Subacute  thyroiditis 09/21/2018   • Emphysema of lung ([facility]) 09/12/2018   • Lung nodules 09/12/2018   • Aortic aneurysm ([facility]) 09/12/2018   • Cardiomyopathy ([facility]) 08/08/2018   • Essential hypertension 07/26/2018   • History of MI (myocardial infarction) 07/26/2018   • Left elbow pain 07/26/2018   • Cigarette nicotine dependence without complication 07/26/2018   • Cough 07/26/2018   • Pacemaker 07/26/2018   • Chronic atrial fibrillation (HCC) 07/26/2018   • Mixed hyperlipidemia 07/26/2018        Allergies: Patient has no known allergies.    Current Outpatient Medications   Medication Sig Dispense Refill   • magnesium oxide (MAG-OX) 400 MG Tab tablet Take 400 mg by mouth every day.     • metoprolol SR (TOPROL XL) 50 MG TABLET SR 24 HR      • metoprolol SR (TOPROL XL) 50 MG TABLET SR 24 HR Take 50 mg by mouth.     • levothyroxine (SYNTHROID) 88 MCG Tab Take 1 Tablet by mouth every morning on an empty stomach. 90 Tablet 0   • pravastatin (PRAVACHOL) 80 MG tablet Take 1 Tablet by mouth every day. 90 Tablet 1   • ammonium lactate (LAC-HYDRIN) 12 % Lotion Apply 1 Application topically 3 times a day as needed (dry skin). 225 g 1   • nitroglycerin (NITROSTAT) 0.4 MG SL Tab Place 0.4 mg under the tongue as needed.     • lisinopril (PRINIVIL) 5 MG Tab Take 5 mg by mouth every day.     • metoprolol (LOPRESSOR) 25 MG Tab Take 50 mg by mouth 2 times a day.     • digoxin (LANOXIN) 125 MCG Tab Take 125 mcg by mouth every day. For 90 days     • albuterol 108 (90 Base) MCG/ACT Aero Soln inhalation aerosol Inhale 2 Puffs by mouth every 6 hours as needed for Shortness of Breath. 1 Inhaler 1   • apixaban (ELIQUIS) 5mg Tab Take 5 mg by mouth 2 Times a Day.     • aspirin EC (ECOTRIN) 81 MG Tablet Delayed Response Take 81 mg by mouth every day.     • magnesium oxide (MAG-OX) 400 MG Tab Take 400 mg by mouth every day.       No current facility-administered medications for this visit.       Social History     Tobacco Use   • Smoking status: Light  "Tobacco Smoker     Packs/day: 0.50     Years: 45.00     Pack years: 22.50     Types: Cigarettes   • Smokeless tobacco: Never Used   • Tobacco comment: Working on quitting    Vaping Use   • Vaping Use: Never used   Substance Use Topics   • Alcohol use: Yes     Alcohol/week: 0.6 oz     Types: 1 Cans of beer per week     Comment: monthly   • Drug use: No     Social History     Social History Narrative   • Not on file       Family History   Problem Relation Age of Onset   • Heart Attack Mother    • Heart Disease Mother    • Hypertension Mother    • Cancer Father    • Heart Attack Father         s/p CABG   • Heart Disease Father         AAA   • Hypertension Father    • Heart Attack Paternal Grandmother    • Heart Attack Paternal Grandfather        Allergies, past medical history, past surgical history, family history, social history reviewed and updated.    Review of Systems:     - Constitutional: Negative for fever, chills, unexpected weight change, and fatigue/generalized weakness.     - Respiratory: Negative for cough, sputum production, chest congestion, dyspnea, wheezing, and crackles.      - Cardiovascular: Negative for chest pain, palpitations, orthopnea, and bilateral lower extremity edema.     - Psychiatric/Behavioral: Negative for depression, suicidal/homicidal ideation and memory loss.      All other systems reviewed and are negative    Exam:    /60   Pulse 67   Temp 36.1 °C (97 °F) (Temporal)   Resp 14   Ht 1.803 m (5' 11\")   Wt 89.6 kg (197 lb 9.6 oz)   SpO2 95%   BMI 27.56 kg/m²  Body mass index is 27.56 kg/m².    Physical Exam:  Constitutional: Well-developed and well-nourished. Not diaphoretic. No distress.   Cardiovascular: Regular rate and rhythm, S1 and S2 without murmur, rubs, or gallops.    Chest: Effort normal. Clear to auscultation throughout. No adventitious sounds.   Neurological: Alert and oriented x 3.   Psychiatric:  Behavior, mood, and affect are appropriate.  MA/nursing note and " vitals reviewed.    LABS: 4/2022  results reviewed and discussed with the patient, questions answered.    Assessment/Plan:  1. Hypothyroidism, unspecified type  Uncontrolled.  We will recheck labs in 6 weeks, refills given.  - levothyroxine (SYNTHROID) 88 MCG Tab; Take 1 Tablet by mouth every morning on an empty stomach.  Dispense: 90 Tablet; Refill: 0  - TSH; Future  - FREE THYROXINE; Future    2. Mixed hyperlipidemia  Uncontrolled, likely lifestyle as well as uncontrolled thyroid disorder.  Patient is uninterested in quitting smoking. Discussed etiology and potential risk factors. Discussed 10-year ASCVD risk. Educated on healthy lifestyle including nutrition and exercise.  Avoid excessive red meat and simple carbohydrates.  Increase fiber intake to 25-30 g daily if tolerated and take fish oil 2 g nightly.  Advised low saturated fat, low carbohydrate diet.  Quit smoking if you do. Maintain adequate hydration. Advise daily exercise 45-60 mins per tolerance and preference.  We will follow-up in July.  Will appreciate patient meeting with his cardiologist  - pravastatin (PRAVACHOL) 80 MG tablet; Take 1 Tablet by mouth every day.  Dispense: 90 Tablet; Refill: 1  - REFERRAL TO CARDIOLOGY  - Lipid Profile; Future    3. Xerosis of skin  - ammonium lactate (LAC-HYDRIN) 12 % Lotion; Apply 1 Application topically 3 times a day as needed (dry skin).  Dispense: 225 g; Refill: 1       Discussed with patient possible alternative diagnoses, pt is to take all medications as prescribed. If symptoms persist FU w/PCP, if symptoms worsen go to emergency room. If experiencing any side effects from prescribed medications reports to the office immediately or go to emergency room.  Reviewed indication, dosage, usage and potential adverse effects of prescribed medications. Reviewed risks and benefits of treatment plan. Patient verbalizes understanding of all instruction and verbally agrees to plan.    No follow-ups on file. 2 mos

## 2022-04-29 NOTE — ASSESSMENT & PLAN NOTE
Chronic intermittent problem.  Generalized dry skin, previously prescribed Lac-Hydrin, would like a refill today

## 2022-07-29 ENCOUNTER — OFFICE VISIT (OUTPATIENT)
Dept: MEDICAL GROUP | Facility: PHYSICIAN GROUP | Age: 72
End: 2022-07-29
Payer: MEDICARE

## 2022-07-29 VITALS
HEART RATE: 63 BPM | HEIGHT: 71 IN | SYSTOLIC BLOOD PRESSURE: 118 MMHG | DIASTOLIC BLOOD PRESSURE: 60 MMHG | RESPIRATION RATE: 12 BRPM | BODY MASS INDEX: 27.44 KG/M2 | TEMPERATURE: 97.3 F | WEIGHT: 196 LBS | OXYGEN SATURATION: 96 %

## 2022-07-29 DIAGNOSIS — E78.2 MIXED HYPERLIPIDEMIA: ICD-10-CM

## 2022-07-29 DIAGNOSIS — F17.200 CURRENT EVERY DAY SMOKER: ICD-10-CM

## 2022-07-29 DIAGNOSIS — E03.9 HYPOTHYROIDISM, UNSPECIFIED TYPE: ICD-10-CM

## 2022-07-29 DIAGNOSIS — M25.552 HIP PAIN, BILATERAL: ICD-10-CM

## 2022-07-29 DIAGNOSIS — R60.0 BILATERAL EDEMA OF LOWER EXTREMITY: ICD-10-CM

## 2022-07-29 DIAGNOSIS — M25.551 HIP PAIN, BILATERAL: ICD-10-CM

## 2022-07-29 PROBLEM — J44.1 ACUTE EXACERBATION OF CHRONIC OBSTRUCTIVE AIRWAYS DISEASE (HCC): Status: ACTIVE | Noted: 2018-09-12

## 2022-07-29 PROCEDURE — 99214 OFFICE O/P EST MOD 30 MIN: CPT | Performed by: NURSE PRACTITIONER

## 2022-07-29 RX ORDER — FUROSEMIDE 20 MG/1
20 TABLET ORAL DAILY
Qty: 7 TABLET | Refills: 0 | Status: SHIPPED | OUTPATIENT
Start: 2022-07-29 | End: 2022-08-05

## 2022-07-29 RX ORDER — ISOSORBIDE MONONITRATE 30 MG/1
30 TABLET, EXTENDED RELEASE ORAL DAILY
COMMUNITY
Start: 2022-06-02 | End: 2022-08-12

## 2022-07-29 RX ORDER — LISINOPRIL 5 MG/1
1 TABLET ORAL DAILY
COMMUNITY
Start: 2022-03-22 | End: 2022-08-12

## 2022-07-29 RX ORDER — POTASSIUM CHLORIDE 750 MG/1
10 TABLET, FILM COATED, EXTENDED RELEASE ORAL 2 TIMES DAILY
Qty: 14 TABLET | Refills: 0 | Status: SHIPPED | OUTPATIENT
Start: 2022-07-29 | End: 2023-05-24

## 2022-07-29 RX ORDER — LEVOTHYROXINE SODIUM 88 UG/1
88 TABLET ORAL
Qty: 90 TABLET | Refills: 3 | Status: SHIPPED | OUTPATIENT
Start: 2022-07-29 | End: 2023-08-15 | Stop reason: SDUPTHER

## 2022-07-29 RX ORDER — ROSUVASTATIN CALCIUM 40 MG/1
40 TABLET, COATED ORAL DAILY
COMMUNITY
Start: 2022-07-01 | End: 2022-07-29

## 2022-07-29 RX ORDER — LEVOTHYROXINE SODIUM 88 UG/1
88 TABLET ORAL
Qty: 90 TABLET | Refills: 1 | Status: SHIPPED | OUTPATIENT
Start: 2022-07-29 | End: 2022-07-29 | Stop reason: SDUPTHER

## 2022-07-29 RX ORDER — NITROGLYCERIN 0.4 MG/1
1 TABLET SUBLINGUAL PRN
COMMUNITY
Start: 2022-05-26 | End: 2022-08-12

## 2022-07-29 RX ORDER — ISOSORBIDE MONONITRATE 30 MG/1
30 TABLET, EXTENDED RELEASE ORAL DAILY
COMMUNITY
Start: 2022-06-02 | End: 2023-05-24

## 2022-07-29 RX ORDER — ROSUVASTATIN CALCIUM 40 MG/1
40 TABLET, COATED ORAL DAILY
COMMUNITY
Start: 2022-06-02

## 2022-07-29 ASSESSMENT — FIBROSIS 4 INDEX: FIB4 SCORE: 2.54

## 2022-07-29 NOTE — PROGRESS NOTES
Chief Complaint   Patient presents with   • Establish Care   • Follow-Up     Fv for labs       HISTORY OF PRESENT ILLNESS: Patient is a 72 y.o. male, established patient who presents today to discuss medical problems as listed below:    Health Maintenance:  COMPLETED     Hypothyroidism  Chronic problem.  Well-controlled on levothyroxine 88 mcg.  Labs from July 2022 with TSH at 2.5 and T4 at 1.26 denies hyper or hyperthyroid symptoms.    Mixed hyperlipidemia  Chronic problem.  Recent labs with total cholesterol 128, triglycerides 302, LDL at 58 HDL at 29.  Patient please golf once a week otherwise her activity is low.  He drinks regular Coke daily which presumably contributing to hydrothorax.  He is followed by [facility], previously on pravastatin 80 mg which was switched to rosuvastatin 40 mg.  Has a follow-up with cardiology in 2 months.      Current every day smoker  50 pack smoking Hx   Gradually decreasing the amount of cigarettes, currently 6 cigarettes/day.  Previously tried nicotine patches which did not work for him.  He is not interested in any other alternatives for smoking cessation, planning on working on it on his own.  Patient counseled on smoking cessation.      Hip pain, bilateral  New problem.  Chronic bilateral hip pain worse with prolonged sitting and prolonged movement.  No history of falls or fracture, taking nothing for pain.    Bilateral edema of lower extremity  New problem. Generalized swelling of b/l lower extremities. This started a month ago, better in the mornings getting worse in the evenings. Hx of CHF with recent EF 40%. Denies CP, SOB. No leg pain.      Patient Active Problem List    Diagnosis Date Noted   • Hip pain, bilateral 07/29/2022   • Bilateral edema of lower extremity 07/29/2022   • Xerosis of skin 04/29/2022   • H/O cardiac radiofrequency ablation: 11/22/21.  RFA of 3 different VT morphologies from lateral wall scar.  Dr Campbell  11/23/2021   • Current every day  smoker 10/21/2021   • Peripheral cyanosis 06/04/2021   • Abdominal aortic aneurysm (AAA) without rupture ([facility]) 04/02/2021   • Chronic anticoagulation 04/02/2021   • Hx of CABG 04/02/2021   • Coronary artery disease involving native coronary artery of native heart without angina pectoris 04/02/2021   • Chronic bronchitis ([facility]) 03/10/2021   • Decreased hearing of both ears 03/10/2021   • AICD (automatic cardioverter/defibrillator) present 12/09/2020   • Ventricular tachycardia ([facility]) 12/09/2020   • Paroxysmal atrial fibrillation ([facility]) 12/09/2020   • Hypothyroidism 11/27/2019   • Vitamin D deficiency 11/27/2019   • Elevated hemoglobin A1c 11/08/2019   • Subacute thyroiditis 09/21/2018   • Acute exacerbation of chronic obstructive airways disease ([facility]) 09/12/2018   • Lung nodules 09/12/2018   • Aortic aneurysm ([facility]) 09/12/2018   • Cardiomyopathy ([facility]) 08/08/2018   • Essential hypertension 07/26/2018   • History of MI (myocardial infarction) 07/26/2018   • Left elbow pain 07/26/2018   • Cigarette nicotine dependence without complication 07/26/2018   • Cough 07/26/2018   • Pacemaker 07/26/2018   • Chronic atrial fibrillation ([facility]) 07/26/2018   • Mixed hyperlipidemia 07/26/2018        Allergies: Patient has no known allergies.    Current Outpatient Medications   Medication Sig Dispense Refill   • isosorbide mononitrate SR (IMDUR) 30 MG TABLET SR 24 HR Take 30 mg by mouth every day.     • isosorbide mononitrate SR (IMDUR) 30 MG TABLET SR 24 HR Take 30 mg by mouth every day.     • lisinopril (PRINIVIL) 5 MG Tab Take 1 Tablet by mouth every day.     • nitroglycerin (NITROSTAT) 0.4 MG SL Tab Place 1 Tablet under the tongue as needed.     • rosuvastatin (CRESTOR) 40 MG tablet Take 40 mg by mouth every day.     • apixaban (ELIQUIS) 5mg Tab Take 1 Tablet by mouth 2 times a day.     • levothyroxine (SYNTHROID) 88 MCG Tab Take 1 Tablet by mouth every morning on an empty stomach. 90 Tablet 3   • furosemide (LASIX) 20 MG Tab Take 1  Tablet by mouth every day for 7 days. 7 Tablet 0   • potassium chloride ER (KLOR-CON) 10 MEQ tablet Take 1 Tablet by mouth 2 times a day. 14 Tablet 0   • magnesium oxide (MAG-OX) 400 MG Tab tablet Take 400 mg by mouth every day.     • metoprolol SR (TOPROL XL) 50 MG TABLET SR 24 HR      • metoprolol SR (TOPROL XL) 50 MG TABLET SR 24 HR Take 50 mg by mouth.     • ammonium lactate (LAC-HYDRIN) 12 % Lotion Apply 1 Application topically 3 times a day as needed (dry skin). 225 g 1   • nitroglycerin (NITROSTAT) 0.4 MG SL Tab Place 0.4 mg under the tongue as needed.     • lisinopril (PRINIVIL) 5 MG Tab Take 5 mg by mouth every day.     • metoprolol (LOPRESSOR) 25 MG Tab Take 50 mg by mouth 2 times a day.     • digoxin (LANOXIN) 125 MCG Tab Take 125 mcg by mouth every day. For 90 days     • albuterol 108 (90 Base) MCG/ACT Aero Soln inhalation aerosol Inhale 2 Puffs by mouth every 6 hours as needed for Shortness of Breath. 1 Inhaler 1   • apixaban (ELIQUIS) 5mg Tab Take 5 mg by mouth 2 Times a Day.     • aspirin EC (ECOTRIN) 81 MG Tablet Delayed Response Take 81 mg by mouth every day.     • magnesium oxide (MAG-OX) 400 MG Tab Take 400 mg by mouth every day.     • pravastatin (PRAVACHOL) 80 MG tablet Take 1 Tablet by mouth every day. (Patient not taking: Reported on 7/29/2022) 90 Tablet 1     No current facility-administered medications for this visit.       Social History     Tobacco Use   • Smoking status: Light Tobacco Smoker     Packs/day: 0.50     Years: 45.00     Pack years: 22.50     Types: Cigarettes   • Smokeless tobacco: Never Used   • Tobacco comment: Working on quitting    Vaping Use   • Vaping Use: Never used   Substance Use Topics   • Alcohol use: Yes     Alcohol/week: 0.6 oz     Types: 1 Cans of beer per week     Comment: monthly   • Drug use: No     Social History     Social History Narrative   • Not on file       Family History   Problem Relation Age of Onset   • Heart Attack Mother    • Heart Disease Mother   "  • Hypertension Mother    • Cancer Father    • Heart Attack Father         s/p CABG   • Heart Disease Father         AAA   • Hypertension Father    • Heart Attack Paternal Grandmother    • Heart Attack Paternal Grandfather        Allergies, past medical history, past surgical history, family history, social history reviewed and updated.    Review of Systems:     - Constitutional: Negative for fever, chills, unexpected weight change, and fatigue/generalized weakness.     - Respiratory: Negative for cough, sputum production, chest congestion, dyspnea, wheezing, and crackles.      - Cardiovascular: Negative for chest pain, palpitations, orthopnea. c/o bilateral lower extremity edema.     - Musculoskeletal: bilateral hip pain    - Psychiatric/Behavioral: Negative for depression, suicidal/homicidal ideation and memory loss.      All other systems reviewed and are negative    Exam:    /60 (BP Location: Left arm, Patient Position: Sitting)   Pulse 63   Temp 36.3 °C (97.3 °F) (Temporal)   Resp 12   Ht 1.803 m (5' 11\")   Wt 88.9 kg (196 lb)   SpO2 96%   BMI 27.34 kg/m²  Body mass index is 27.34 kg/m².    Physical Exam:  Constitutional: Well-developed and well-nourished. Not diaphoretic. No distress.   Cardiovascular: Regular rate and rhythm, S1 and S2 with murmur (known), rubs, or gallops.    Chest: Effort normal. Clear to auscultation throughout. No adventitious sounds. No CVA tenderness.  Extremities: Mild to moderate generalized edema and erythema in bilateral ankles.  No cyanosis, clubbing. Distal pulses intact and symmetric.   Musculoskeletal: All major joints AROM full in all directions without pain.  Neurological: Alert and oriented x 3.   Psychiatric:  Behavior, mood, and affect are appropriate.  MA/nursing note and vitals reviewed.    Assessment/Plan:  1. Hypothyroidism, unspecified type  Stable on current regimen.  Continue.  Refills given  - levothyroxine (SYNTHROID) 88 MCG Tab; Take 1 Tablet by " mouth every morning on an empty stomach.  Dispense: 90 Tablet; Refill: 3    2. Mixed hyperlipidemia  Uncontrolled, stable. Advised to decrease sugary drinks and started, increase fiber intake. Followed by CT cardiology    3. Hip pain, bilateral  Suspecting arthritis.  Will obtain imaging and follow-up in 2 weeks.  - DX-HIP-BILATERAL-WITH PELVIS-5+; Future    4. Current every day smoker  Patient counseled.  Other modalities offered to accommodate and help patient quit smoking.  Patient declined at this time and will continue on his own.  We will continue to address at every appointment.    5. Bilateral edema of lower extremity  Likely due to CHF..  Advised to keep legs elevated above the heart.  Avoid salty foods.  Tried Lasix, take potassium every time you take Lasix.  - furosemide (LASIX) 20 MG Tab; Take 1 Tablet by mouth every day for 7 days.  Dispense: 7 Tablet; Refill: 0  - potassium chloride ER (KLOR-CON) 10 MEQ tablet; Take 1 Tablet by mouth 2 times a day.  Dispense: 14 Tablet; Refill: 0       Discussed with patient possible alternative diagnoses, pt is to take all medications as prescribed. If symptoms persist FU w/PCP, if symptoms worsen go to emergency room. If experiencing any side effects from prescribed medications reports to the office immediately or go to emergency room.  Reviewed indication, dosage, usage and potential adverse effects of prescribed medications. Reviewed risks and benefits of treatment plan. Patient verbalizes understanding of all instruction and verbally agrees to plan.    No follow-ups on file. 2 wks

## 2022-07-29 NOTE — ASSESSMENT & PLAN NOTE
New problem. Generalized swelling of b/l lower extremities. This started a month ago, better in the mornings getting worse in the evenings. Hx of CHF with recent EF 40%. Denies CP, SOB. No leg pain.

## 2022-07-29 NOTE — ASSESSMENT & PLAN NOTE
New problem.  Chronic bilateral hip pain worse with prolonged sitting and prolonged movement.  No history of falls or fracture, taking nothing for pain.

## 2022-07-29 NOTE — ASSESSMENT & PLAN NOTE
Chronic problem.  Recent labs with total cholesterol 128, triglycerides 302, LDL at 58 HDL at 29.  Patient please golf once a week otherwise her activity is low.  He drinks regular Coke daily which presumably contributing to hydrothorax.  He is followed by [facility], previously on pravastatin 80 mg which was switched to rosuvastatin 40 mg.  Has a follow-up with cardiology in 2 months.

## 2022-07-29 NOTE — ASSESSMENT & PLAN NOTE
50 pack smoking Hx   Gradually decreasing the amount of cigarettes, currently 6 cigarettes/day.  Previously tried nicotine patches which did not work for him.  He is not interested in any other alternatives for smoking cessation, planning on working on it on his own.  Patient counseled on smoking cessation.

## 2022-07-29 NOTE — ASSESSMENT & PLAN NOTE
Chronic problem.  Well-controlled on levothyroxine 88 mcg.  Labs from July 2022 with TSH at 2.5 and T4 at 1.26 denies hyper or hyperthyroid symptoms.

## 2022-08-03 ENCOUNTER — APPOINTMENT (OUTPATIENT)
Dept: RADIOLOGY | Facility: IMAGING CENTER | Age: 72
End: 2022-08-03
Attending: NURSE PRACTITIONER
Payer: MEDICARE

## 2022-08-03 ENCOUNTER — NON-PROVIDER VISIT (OUTPATIENT)
Dept: URGENT CARE | Facility: CLINIC | Age: 72
End: 2022-08-03
Payer: MEDICARE

## 2022-08-03 DIAGNOSIS — M25.551 HIP PAIN, BILATERAL: ICD-10-CM

## 2022-08-03 DIAGNOSIS — M25.552 HIP PAIN, BILATERAL: ICD-10-CM

## 2022-08-03 PROCEDURE — 73522 X-RAY EXAM HIPS BI 3-4 VIEWS: CPT | Mod: TC | Performed by: NURSE PRACTITIONER

## 2022-08-12 ENCOUNTER — OFFICE VISIT (OUTPATIENT)
Dept: MEDICAL GROUP | Facility: PHYSICIAN GROUP | Age: 72
End: 2022-08-12
Payer: MEDICARE

## 2022-08-12 VITALS — RESPIRATION RATE: 12 BRPM | BODY MASS INDEX: 27.72 KG/M2 | TEMPERATURE: 97.1 F | HEIGHT: 71 IN | WEIGHT: 198 LBS

## 2022-08-12 DIAGNOSIS — M25.552 HIP PAIN, BILATERAL: ICD-10-CM

## 2022-08-12 DIAGNOSIS — J43.9 PULMONARY EMPHYSEMA, UNSPECIFIED EMPHYSEMA TYPE (HCC): ICD-10-CM

## 2022-08-12 DIAGNOSIS — M25.551 HIP PAIN, BILATERAL: ICD-10-CM

## 2022-08-12 PROCEDURE — 99214 OFFICE O/P EST MOD 30 MIN: CPT | Performed by: NURSE PRACTITIONER

## 2022-08-12 RX ORDER — LANOLIN ALCOHOL/MO/W.PET/CERES
400 CREAM (GRAM) TOPICAL DAILY
COMMUNITY
Start: 2022-08-02 | End: 2022-08-12

## 2022-08-12 RX ORDER — FUROSEMIDE 20 MG/1
TABLET ORAL
COMMUNITY
Start: 2022-07-29 | End: 2022-08-12

## 2022-08-12 RX ORDER — METOPROLOL SUCCINATE 50 MG/1
1 TABLET, EXTENDED RELEASE ORAL 2 TIMES DAILY
COMMUNITY
Start: 2022-07-01

## 2022-08-12 RX ORDER — LANOLIN ALCOHOL/MO/W.PET/CERES
400 CREAM (GRAM) TOPICAL DAILY
COMMUNITY
Start: 2022-08-04 | End: 2022-08-12

## 2022-08-12 RX ORDER — ALBUTEROL SULFATE 90 UG/1
2 AEROSOL, METERED RESPIRATORY (INHALATION) EVERY 6 HOURS PRN
Qty: 1 EACH | Refills: 1 | Status: SHIPPED | OUTPATIENT
Start: 2022-08-12

## 2022-08-12 RX ORDER — MELOXICAM 7.5 MG/1
7.5 TABLET ORAL DAILY
Qty: 30 TABLET | Refills: 1 | Status: SHIPPED | OUTPATIENT
Start: 2022-08-12 | End: 2022-10-03

## 2022-08-12 ASSESSMENT — FIBROSIS 4 INDEX: FIB4 SCORE: 2.54

## 2022-08-12 NOTE — ASSESSMENT & PLAN NOTE
Chronic and stable.  Patient was referred PFT test in January 2022, no results available.  We will request results from [facility].  Patient was referred to pulmonology, however has not established.  Will place in a referral today.  Patient is a smoker.  On occasion experiences dyspnea for which he is using albuterol which helps.  Needing refill today.

## 2022-08-12 NOTE — ASSESSMENT & PLAN NOTE
Xray b/l hips from 8/3/22  IMPRESSION:     1.  No radiographic evidence of acute traumatic injury.  2.  Moderate degenerative changes of the bilateral hip joints, greater on the left.

## 2022-08-12 NOTE — PROGRESS NOTES
Annual Health Assessment Questions:    1.  Are you currently engaging in any exercise or physical activity? Yes    2.  How would you describe your mood or emotional well-being today? good    3.  Have you had any falls in the last year? No    4.  Have you noticed any problems with your balance or had difficulty walking? No    5.  In the last six months have you experienced any leakage of urine? No    6. DPA/Advanced Directive: Patient has Advanced Directive on file.  As well as living will

## 2022-08-12 NOTE — PROGRESS NOTES
Chief Complaint   Patient presents with    Lab Results     X ray results        HISTORY OF PRESENT ILLNESS: Patient is a 72 y.o. male, established patient who presents today to discuss medical problems as listed below:    Health Maintenance:  COMPLETED     Hip pain, bilateral  Xray b/l hips from 8/3/22  IMPRESSION:     1.  No radiographic evidence of acute traumatic injury.  2.  Moderate degenerative changes of the bilateral hip joints, greater on the left.        Pulmonary emphysema (HCC)  Chronic and stable.  Patient was referred PFT test in January 2022, no results available.  We will request results from [facility].  Patient was referred to pulmonology, however has not established.  Will place in a referral today.  Patient is a smoker.  On occasion experiences dyspnea for which he is using albuterol which helps.  Needing refill today.    Patient Active Problem List    Diagnosis Date Noted    Pulmonary emphysema (HCC) 08/12/2022    Hip pain, bilateral 07/29/2022    Bilateral edema of lower extremity 07/29/2022    Xerosis of skin 04/29/2022    H/O cardiac radiofrequency ablation: 11/22/21.  RFA of 3 different VT morphologies from lateral wall scar.  Dr Campbell  11/23/2021    Current every day smoker 10/21/2021    Peripheral cyanosis 06/04/2021    Abdominal aortic aneurysm (AAA) without rupture (HCC) 04/02/2021    Chronic anticoagulation 04/02/2021    Hx of CABG 04/02/2021    Coronary artery disease involving native coronary artery of native heart without angina pectoris 04/02/2021    Chronic bronchitis (HCC) 03/10/2021    Decreased hearing of both ears 03/10/2021    AICD (automatic cardioverter/defibrillator) present 12/09/2020    Ventricular tachycardia (HCC) 12/09/2020    Paroxysmal atrial fibrillation (HCC) 12/09/2020    Hypothyroidism 11/27/2019    Vitamin D deficiency 11/27/2019    Elevated hemoglobin A1c 11/08/2019    Subacute thyroiditis 09/21/2018    Acute exacerbation of chronic obstructive airways disease  ([facility]) 09/12/2018    Lung nodules 09/12/2018    Aortic aneurysm ([facility]) 09/12/2018    Cardiomyopathy ([facility]) 08/08/2018    Essential hypertension 07/26/2018    History of MI (myocardial infarction) 07/26/2018    Left elbow pain 07/26/2018    Cigarette nicotine dependence without complication 07/26/2018    Cough 07/26/2018    Pacemaker 07/26/2018    Chronic atrial fibrillation ([facility]) 07/26/2018    Mixed hyperlipidemia 07/26/2018        Allergies: Patient has no known allergies.    Current Outpatient Medications   Medication Sig Dispense Refill    metoprolol SR (TOPROL XL) 50 MG TABLET SR 24 HR Take 1 Tablet by mouth 2 times a day.      meloxicam (MOBIC) 7.5 MG Tab Take 1 Tablet by mouth every day. 30 Tablet 1    albuterol 108 (90 Base) MCG/ACT Aero Soln inhalation aerosol Inhale 2 Puffs every 6 hours as needed for Shortness of Breath. 1 Each 1    isosorbide mononitrate SR (IMDUR) 30 MG TABLET SR 24 HR Take 30 mg by mouth every day.      rosuvastatin (CRESTOR) 40 MG tablet Take 40 mg by mouth every day.      levothyroxine (SYNTHROID) 88 MCG Tab Take 1 Tablet by mouth every morning on an empty stomach. 90 Tablet 3    potassium chloride ER (KLOR-CON) 10 MEQ tablet Take 1 Tablet by mouth 2 times a day. 14 Tablet 0    ammonium lactate (LAC-HYDRIN) 12 % Lotion Apply 1 Application topically 3 times a day as needed (dry skin). 225 g 1    nitroglycerin (NITROSTAT) 0.4 MG SL Tab Place 0.4 mg under the tongue as needed.      lisinopril (PRINIVIL) 5 MG Tab Take 5 mg by mouth every day.      digoxin (LANOXIN) 125 MCG Tab Take 125 mcg by mouth every day. For 90 days      apixaban (ELIQUIS) 5mg Tab Take 5 mg by mouth 2 Times a Day.      aspirin EC (ECOTRIN) 81 MG Tablet Delayed Response Take 81 mg by mouth every day.      magnesium oxide (MAG-OX) 400 MG Tab Take 400 mg by mouth every day.       No current facility-administered medications for this visit.       Social History     Tobacco Use    Smoking status: Light Smoker     Packs/day:  "0.50     Years: 45.00     Pack years: 22.50     Types: Cigarettes    Smokeless tobacco: Never    Tobacco comments:     Working on quitting    Vaping Use    Vaping Use: Never used   Substance Use Topics    Alcohol use: Yes     Alcohol/week: 0.6 oz     Types: 1 Cans of beer per week     Comment: monthly    Drug use: No     Social History     Social History Narrative    Not on file       Family History   Problem Relation Age of Onset    Heart Attack Mother     Heart Disease Mother     Hypertension Mother     Cancer Father     Heart Attack Father         s/p CABG    Heart Disease Father         AAA    Hypertension Father     Heart Attack Paternal Grandmother     Heart Attack Paternal Grandfather        Allergies, past medical history, past surgical history, family history, social history reviewed and updated.    Review of Systems:     - Constitutional: Negative for fever, chills, unexpected weight change, and fatigue/generalized weakness.      - Respiratory: Negative for cough, sputum production, chest congestion, dyspnea, wheezing, and crackles.      - Cardiovascular: Negative for chest pain, palpitations, orthopnea, and bilateral lower extremity edema.       - Psychiatric/Behavioral: Negative for depression, suicidal/homicidal ideation and memory loss.      All other systems reviewed and are negative    Exam:    BP (P) 124/68   Pulse (P) 63   Temp 36.2 °C (97.1 °F) (Temporal)   Resp 12   Ht 1.803 m (5' 11\")   Wt 89.8 kg (198 lb)   SpO2 (P) 98%   BMI 27.62 kg/m²  Body mass index is 27.62 kg/m².    Physical Exam:  Constitutional: Well-developed and well-nourished. Not diaphoretic. No distress.   Cardiovascular: Regular rate and rhythm, S1 and S2 without murmur, rubs, or gallops.    Chest: Effort normal. Clear to auscultation throughout. No adventitious sounds. Neurological: Alert and oriented x 3.   Psychiatric:  Behavior, mood, and affect are appropriate.  MA/nursing note and vitals " reviewed.    Assessment/Plan:  1. Hip pain, bilateral  Test results discussed. Trial of Mobic for pain. Recommend PT as it proved to be much more effective long term. Pt declined PT today. Will follow up in 6-8 wks  - meloxicam (MOBIC) 7.5 MG Tab; Take 1 Tablet by mouth every day.  Dispense: 30 Tablet; Refill: 1    2. Pulmonary emphysema, unspecified emphysema type (HCC)  - albuterol 108 (90 Base) MCG/ACT Aero Soln inhalation aerosol; Inhale 2 Puffs every 6 hours as needed for Shortness of Breath.  Dispense: 1 Each; Refill: 1  - Referral to Pulmonary and Sleep Medicine       Discussed with patient possible alternative diagnoses, pt is to take all medications as prescribed. If symptoms persist FU w/PCP, if symptoms worsen go to emergency room. If experiencing any side effects from prescribed medications reports to the office immediately or go to emergency room.  Reviewed indication, dosage, usage and potential adverse effects of prescribed medications. Reviewed risks and benefits of treatment plan. Patient verbalizes understanding of all instruction and verbally agrees to plan.    No follow-ups on file. 6-8 wks

## 2022-09-15 ENCOUNTER — OFFICE VISIT (OUTPATIENT)
Dept: MEDICAL GROUP | Facility: PHYSICIAN GROUP | Age: 72
End: 2022-09-15
Payer: MEDICARE

## 2022-09-15 VITALS
TEMPERATURE: 97.9 F | DIASTOLIC BLOOD PRESSURE: 62 MMHG | HEIGHT: 70 IN | SYSTOLIC BLOOD PRESSURE: 116 MMHG | HEART RATE: 84 BPM | WEIGHT: 194.6 LBS | RESPIRATION RATE: 16 BRPM | OXYGEN SATURATION: 95 % | BODY MASS INDEX: 27.86 KG/M2

## 2022-09-15 DIAGNOSIS — I42.9 CARDIOMYOPATHY, UNSPECIFIED TYPE (HCC): ICD-10-CM

## 2022-09-15 DIAGNOSIS — J44.1 ACUTE EXACERBATION OF CHRONIC OBSTRUCTIVE AIRWAYS DISEASE (HCC): ICD-10-CM

## 2022-09-15 DIAGNOSIS — I47.20 VENTRICULAR TACHYCARDIA (HCC): ICD-10-CM

## 2022-09-15 DIAGNOSIS — I71.9 AORTIC ANEURYSM WITHOUT RUPTURE, UNSPECIFIED PORTION OF AORTA (HCC): ICD-10-CM

## 2022-09-15 DIAGNOSIS — I48.20 CHRONIC ATRIAL FIBRILLATION (HCC): ICD-10-CM

## 2022-09-15 DIAGNOSIS — F17.210 CIGARETTE NICOTINE DEPENDENCE WITHOUT COMPLICATION: ICD-10-CM

## 2022-09-15 DIAGNOSIS — J42 CHRONIC BRONCHITIS, UNSPECIFIED CHRONIC BRONCHITIS TYPE (HCC): ICD-10-CM

## 2022-09-15 PROBLEM — I71.40 ABDOMINAL AORTIC ANEURYSM (AAA) WITHOUT RUPTURE (HCC): Status: RESOLVED | Noted: 2021-04-02 | Resolved: 2022-09-15

## 2022-09-15 PROBLEM — I48.0 PAROXYSMAL ATRIAL FIBRILLATION (HCC): Status: RESOLVED | Noted: 2020-12-09 | Resolved: 2022-09-15

## 2022-09-15 PROCEDURE — 99999 PR NO CHARGE: CPT | Performed by: NURSE PRACTITIONER

## 2022-09-15 ASSESSMENT — ENCOUNTER SYMPTOMS: GENERAL WELL-BEING: FAIR

## 2022-09-15 ASSESSMENT — ACTIVITIES OF DAILY LIVING (ADL): BATHING_REQUIRES_ASSISTANCE: 0

## 2022-09-15 ASSESSMENT — FIBROSIS 4 INDEX: FIB4 SCORE: 2.54

## 2022-09-15 NOTE — ASSESSMENT & PLAN NOTE
Chronic and stable.  Followed by cardiology, Dr. Koch.  On aspirin 81 mg and Eliquis 5 mg twice daily.  Tolerating well.

## 2022-09-15 NOTE — ASSESSMENT & PLAN NOTE
Chronic and stable.  On albuterol inhaler as needed, using it a couple of times a week.  Current everyday smoker, 50 pack years, weaning himself off.  Tried multiple formulations before, has tried Chantix and nicotine patch, did not work for him.

## 2022-09-15 NOTE — ASSESSMENT & PLAN NOTE
Chronic and stable.  Followed with [facility], Dr. Boyer.  On digoxin 125 mcg, Imdur 30 mg nitroglycerin 0.4 mg as needed.

## 2022-09-15 NOTE — ASSESSMENT & PLAN NOTE
Chronic and stable.  Patient on occasion using albuterol inhaler.   History of smoking since 11 years old, currently trying to decrease to 6 to 8 cigarettes/day.  Has tried to quit multiple times in the past.  Patient counseled, he is interested in referral to smoking cessation.  He is also interested in referral to pulmonologist.  He denies CP, cough, chest tightness or wheezing.

## 2022-09-15 NOTE — ASSESSMENT & PLAN NOTE
Chronic and stable.  Followed with cardiology, Dr. Hernandez.  On lisinopril 5 mg, metoprolol SR 50 mg twice a day with a regimen of 50 mg at night and 100 mg in the morning., digoxin 125 mcg and Eliquis 5 mg twice daily.  He is also taking aspirin 81 mg.  His pulse today is 84 and blood pressure is 116/62.

## 2022-09-15 NOTE — PROGRESS NOTES
Chief Complaint   Patient presents with    Annual Wellness Visit       HPI:  Marty is a 72 y.o. here for Medicare Annual Wellness Visit      Patient Active Problem List    Diagnosis Date Noted    Pulmonary emphysema (HCC) 08/12/2022    Hip pain, bilateral 07/29/2022    Bilateral edema of lower extremity 07/29/2022    Xerosis of skin 04/29/2022    H/O cardiac radiofrequency ablation: 11/22/21.  RFA of 3 different VT morphologies from lateral wall scar.  Dr Campbell  11/23/2021    Current every day smoker 10/21/2021    Peripheral cyanosis 06/04/2021    Abdominal aortic aneurysm (AAA) without rupture (HCC) 04/02/2021    Chronic anticoagulation 04/02/2021    Hx of CABG 04/02/2021    Coronary artery disease involving native coronary artery of native heart without angina pectoris 04/02/2021    Chronic bronchitis (HCC) 03/10/2021    Decreased hearing of both ears 03/10/2021    AICD (automatic cardioverter/defibrillator) present 12/09/2020    Ventricular tachycardia (HCC) 12/09/2020    Paroxysmal atrial fibrillation (HCC) 12/09/2020    Hypothyroidism 11/27/2019    Vitamin D deficiency 11/27/2019    Elevated hemoglobin A1c 11/08/2019    Subacute thyroiditis 09/21/2018    Acute exacerbation of chronic obstructive airways disease (HCC) 09/12/2018    Lung nodules 09/12/2018    Aortic aneurysm (HCC) 09/12/2018    Cardiomyopathy (HCC) 08/08/2018    Essential hypertension 07/26/2018    History of MI (myocardial infarction) 07/26/2018    Left elbow pain 07/26/2018    Cigarette nicotine dependence without complication 07/26/2018    Cough 07/26/2018    Pacemaker 07/26/2018    Chronic atrial fibrillation (HCC) 07/26/2018    Mixed hyperlipidemia 07/26/2018       Current Outpatient Medications   Medication Sig Dispense Refill    metoprolol SR (TOPROL XL) 50 MG TABLET SR 24 HR Take 1 Tablet by mouth 2 times a day.      meloxicam (MOBIC) 7.5 MG Tab Take 1 Tablet by mouth every day. 30 Tablet 1    albuterol 108 (90 Base) MCG/ACT Aero Soln  inhalation aerosol Inhale 2 Puffs every 6 hours as needed for Shortness of Breath. 1 Each 1    isosorbide mononitrate SR (IMDUR) 30 MG TABLET SR 24 HR Take 30 mg by mouth every day.      rosuvastatin (CRESTOR) 40 MG tablet Take 40 mg by mouth every day.      levothyroxine (SYNTHROID) 88 MCG Tab Take 1 Tablet by mouth every morning on an empty stomach. 90 Tablet 3    potassium chloride ER (KLOR-CON) 10 MEQ tablet Take 1 Tablet by mouth 2 times a day. 14 Tablet 0    ammonium lactate (LAC-HYDRIN) 12 % Lotion Apply 1 Application topically 3 times a day as needed (dry skin). 225 g 1    nitroglycerin (NITROSTAT) 0.4 MG SL Tab Place 0.4 mg under the tongue as needed.      lisinopril (PRINIVIL) 5 MG Tab Take 5 mg by mouth every day.      digoxin (LANOXIN) 125 MCG Tab Take 125 mcg by mouth every day. For 90 days      apixaban (ELIQUIS) 5mg Tab Take 5 mg by mouth 2 Times a Day.      aspirin EC (ECOTRIN) 81 MG Tablet Delayed Response Take 81 mg by mouth every day.      magnesium oxide (MAG-OX) 400 MG Tab Take 400 mg by mouth every day.       No current facility-administered medications for this visit.        Patient is taking medications as noted in medication list.  Current supplements as per medication list.     Allergies: Patient has no known allergies.    Current social contact/activities: Walk about 1 mile every 3 to 4 days.  Pt lives in the house, pt lives with wife. Family in the area. Wife's family local, son and sister local. Pt enjoys TV and reading news on the computer.    Is patient current with immunizations? No, due for FLU. Patient is interested in receiving FLU today.    He  reports that he has been smoking cigarettes. He has a 22.50 pack-year smoking history. He has never used smokeless tobacco. He reports current alcohol use of about 0.6 oz per week. He reports that he does not use drugs.  Ready to quit: Not Answered  Counseling given: Not Answered  Tobacco comments: Working on quitting       DPA/Advanced  directive: Patient has Advanced Directive and Living Will, but it is not on file. Instructed to bring in a copy to scan into their chart.    ROS:    Gait: Uses no assistive device   Ostomy: No   Other tubes: No   Amputations: No   Chronic oxygen use No   Last eye exam 6 months ago   Wears hearing aids: No   : Denies any urinary leakage during the last 6 months      Screening:    Depression Screening  Little interest or pleasure in doing things?     Feeling down, depressed, or hopeless?    Trouble falling or staying asleep, or sleeping too much?     Feeling tired or having little energy?     Poor appetite or overeating?     Feeling bad about yourself - or that you are a failure or have let yourself or your family down?    Trouble concentrating on things, such as reading the newspaper or watching television?    Moving or speaking so slowly that other people could have noticed.  Or the opposite - being so fidgety or restless that you have been moving around a lot more than usual?     Thoughts that you would be better off dead, or of hurting yourself?     Patient Health Questionnaire Score:      If depressive symptoms identified deferred to follow up visit unless specifically addressed in assessment and plan.    Interpretation of PHQ-9 Total Score   Score Severity   1-4 No Depression   5-9 Mild Depression   10-14 Moderate Depression   15-19 Moderately Severe Depression   20-27 Severe Depression      Screening for Cognitive Impairment  Three Minute Recall (daughter, heaven, devon)  3/3    Draw clock face with all 12 numbers and set the hands to show 10 past 11.  Yes    If cognitive concerns identified, deferred for follow up unless specifically addressed in assessment and plan.    Fall Risk Assessment  Has the patient had two or more falls in the last year or any fall with injury in the last year?  No  If fall risk identified, deferred for follow up unless specifically addressed in assessment and plan.    Safety  Assessment  Throw rugs on floor.  Yes  Handrails on all stairs.  No  Good lighting in all hallways.  Yes  Difficulty hearing.  Yes  Patient counseled about all safety risks that were identified.    Functional Assessment ADLs  Are there any barriers preventing you from cooking for yourself or meeting nutritional needs?  No.    Are there any barriers preventing you from driving safely or obtaining transportation?  No.    Are there any barriers preventing you from using a telephone or calling for help?  No.    Are there any barriers preventing you from shopping?  No.    Are there any barriers preventing you from taking care of your own finances?  No.    Are there any barriers preventing you from managing your medications?    No.    Are there any barriers preventing you from showering, bathing or dressing yourself?  No.    Are you currently engaging in any exercise or physical activity?  Yes.  Walk about 1 mile every 3 to 4 days  What is your perception of your health?  Fair.    Health Maintenance Summary            Overdue - Annual Wellness Visit (Every 366 Days) Overdue - never done      No completion history exists for this topic.              Overdue - IMM HEP B VACCINE (1 of 3 - 3-dose series) Overdue - never done      No completion history exists for this topic.              Ordered - Annual Pulmonary Function Test / Spirometry (Yearly) Ordered on 1/12/2022      01/10/2019  [facility] PULMONARY FUNCTION TEST/LAB              Overdue - COVID-19 Vaccine (4 - Booster for Moderna series) Overdue since 3/15/2022      11/15/2021  Imm Admin: Moderna SARS-CoV-2 Vaccine    03/12/2021  Imm Admin: Moderna SARS-CoV-2 Vaccine    02/12/2021  Imm Admin: Moderna SARS-CoV-2 Vaccine              Overdue - IMM INFLUENZA (1) Overdue since 9/1/2022      10/21/2021  Imm Admin: Influenza Vaccine Adult HD    10/26/2020  Imm Admin: Influenza Vaccine Adult HD    11/08/2019  Imm Admin: Influenza Vaccine Adult HD    09/12/2018  Imm Admin:  Influenza Vaccine Adult HD    01/25/2018  Imm Admin: Influenza (IM) Preservative Free - HISTORICAL DATA              Postponed - HEPATITIS C SCREENING (Once) Postponed until 12/9/2022      No completion history exists for this topic.              IMM DTaP/Tdap/Td Vaccine (2 - Td or Tdap) Next due on 7/26/2028 07/26/2018  Imm Admin: Tdap Vaccine              COLORECTAL CANCER SCREENING (COLONOSCOPY - Every 10 Years) Next due on 10/18/2028      10/18/2018  Surgical Procedure: COLONOSCOPY    12/23/2008  REFERRAL TO GI FOR COLONOSCOPY              IMM ZOSTER VACCINES (Series Information) Completed      01/09/2020  Imm Admin: Zoster Vaccine Recombinant (RZV) (SHINGRIX)    11/08/2019  Imm Admin: Zoster Vaccine Recombinant (RZV) (SHINGRIX)              IMM PNEUMOCOCCAL VACCINE: 65+ Years (Series Information) Completed      10/26/2020  Imm Admin: Pneumococcal polysaccharide vaccine (PPSV-23)    07/26/2018  Imm Admin: Pneumococcal Conjugate Vaccine (Prevnar/PCV-13)              ABDOMINAL AORTIC ANEURYSM (AAA) SCREEN  Completed      12/09/2021  Visit Dx: Abdominal aortic aneurysm (AAA) without rupture (HCC)    12/03/2021  Visit Dx: Abdominal aortic aneurysm (AAA) without rupture (HCC)    12/03/2021  US-AORTA/ILIACS DUPLEX COMPLETE    10/28/2021  Visit Dx: Abdominal aortic aneurysm (AAA) without rupture (HCC)    04/02/2021  Prob Dx: Abdominal aortic aneurysm (AAA) without rupture (HCC)    Only the first 5 history entries have been loaded, but more history exists.              IMM MENINGOCOCCAL ACWY VACCINE (Series Information) Aged Out      No completion history exists for this topic.                    Patient Care Team:  ROSALIA Milligan as PCP - General (Family Medicine)  ROSALIA Milligan as PCP - [facility] Paneled  Shweta Cantor M.D.   Alfredo Read DO (Cardiovascular Disease (Cardiology))  Alfredo Read D.O. (Cardiovascular Disease (Cardiology))  Ryley Gupta Ass't as [facility] Personal  Assistant    Social History     Tobacco Use    Smoking status: Light Smoker     Packs/day: 0.50     Years: 45.00     Pack years: 22.50     Types: Cigarettes    Smokeless tobacco: Never    Tobacco comments:     Working on quitting    Vaping Use    Vaping Use: Never used   Substance Use Topics    Alcohol use: Yes     Alcohol/week: 0.6 oz     Types: 1 Cans of beer per week     Comment: monthly    Drug use: No     Family History   Problem Relation Age of Onset    Heart Attack Mother     Heart Disease Mother     Hypertension Mother     Cancer Father     Heart Attack Father         s/p CABG    Heart Disease Father         AAA    Hypertension Father     Heart Attack Paternal Grandmother     Heart Attack Paternal Grandfather      He  has a past medical history of Anesthesia, Arthritis, Atrial fibrillation (HCC), Chickenpox, Chronic bronchitis (HCC), Dental disorder, Disorder of thyroid, Emphysema of lung (HCC), [place] measles, Hemorrhagic disorder (HCC), High cholesterol, Hypertension, Influenza, Mumps, Myocardial infarct (HCC) (87, ?89, 2007), Other and unspecified angina pectoris (10/2016), Pacemaker (10/20/2016), Pain, and PONV (postoperative nausea and vomiting).    He has no past medical history of Acute nasopharyngitis, Blood clotting disorder (HCC), Bowel habit changes, Breath shortness, Cancer (HCC), Carcinoma in situ of respiratory system, Congestive heart failure (HCC), Continuous ambulatory peritoneal dialysis status (HCC), Coughing blood, Diabetes (HCC), Dialysis patient (HCC), Glaucoma, Gynecological disorder, Heart burn, Heart murmur, Heart valve disease, Hepatitis A, Hepatitis B, Hepatitis C, Hiatus hernia syndrome, Indigestion, Jaundice, Pneumonia, Pregnant, Psychiatric problem, Renal disorder, Rheumatic fever, Seizure (HCC), Sleep apnea, Snoring, Stroke (HCC), Tuberculosis, Urinary bladder disorder, or Urinary incontinence.   Past Surgical History:   Procedure Laterality Date    COLONOSCOPY  10/18/2018     "Procedure: COLONOSCOPY - W/POSS BX, DILATION, POLYPECTOMY, CONTROL OF HEMORRHAGE;  Surgeon: Pineda Horvath M.D.;  Location: SURGERY [facility];  Service: EUS    PACEMAKER INSERTION  10/20/2016    INGUINAL HERNIA REPAIR BILATERAL  3/2/2015    Performed by Jack Tobias M.D. at SURGERY SAME DAY [facility]    COLONOSCOPY  2008    MULTIPLE CORONARY ARTERY BYPASS  1991    stents (X 3 placement= stents total)    APPENDECTOMY CHILD      OTHER ORTHOPEDIC SURGERY      knee arthoscopy, bilaterally (\"early 80's\")    TONSILLECTOMY  as a child         Exam:   /62 (BP Location: Left arm, Patient Position: Sitting, BP Cuff Size: Adult)   Pulse 84   Temp 36.6 °C (97.9 °F) (Temporal)   Resp 16   Ht 1.772 m (5' 9.75\")   Wt 88.3 kg (194 lb 9.6 oz)   SpO2 95%  Body mass index is 28.12 kg/m².    Hearing good.    Dentition upper and lower dentures  Alert, oriented in no acute distress  Eye contact is good, speech goal directed, affect calm      Assessment and Plan. The following treatment and monitoring plan is recommended:    There are no diagnoses linked to this encounter.     Services suggested: No services needed at this time  Health Care Screening recommendations as per orders if indicated.  Referrals offered: PT/OT/Nutrition counseling/Behavioral Health/Smoking cessation as per orders if indicated.    Discussion today about general wellness and lifestyle habits:    Prevent falls and reduce trip hazards; Cautioned about securing or removing rugs.  Have a working fire alarm and carbon monoxide detector;   Engage in regular physical activity and social activities.     Follow-up: No follow-ups on file.  "

## 2022-09-15 NOTE — ASSESSMENT & PLAN NOTE
Chronic and stable.  Followed by vascular, Dr. Velasquez.  Followed with annual ultrasounds.      12/3/21  Vascular Laboratory   CONCLUSIONS   Infrarenal abdominal aortic aneurysm measuring up to 3.4 cm.   Compared to the prior study on 12/23/2020 - the aneurysm has in creased from    3.2 cm in diameter.

## 2022-09-23 NOTE — PROGRESS NOTES
Chief Complaint   Patient presents with    Annual Wellness Visit         HPI:  Marty is a 72 y.o. here for Medicare Annual Wellness Visit             Patient Active Problem List     Diagnosis Date Noted    Pulmonary emphysema (HCC) 08/12/2022    Hip pain, bilateral 07/29/2022    Bilateral edema of lower extremity 07/29/2022    Xerosis of skin 04/29/2022    H/O cardiac radiofrequency ablation: 11/22/21.  RFA of 3 different VT morphologies from lateral wall scar.  Dr Campbell  11/23/2021    Current every day smoker 10/21/2021    Peripheral cyanosis 06/04/2021    Abdominal aortic aneurysm (AAA) without rupture (HCC) 04/02/2021    Chronic anticoagulation 04/02/2021    Hx of CABG 04/02/2021    Coronary artery disease involving native coronary artery of native heart without angina pectoris 04/02/2021    Chronic bronchitis (HCC) 03/10/2021    Decreased hearing of both ears 03/10/2021    AICD (automatic cardioverter/defibrillator) present 12/09/2020    Ventricular tachycardia (HCC) 12/09/2020    Paroxysmal atrial fibrillation (HCC) 12/09/2020    Hypothyroidism 11/27/2019    Vitamin D deficiency 11/27/2019    Elevated hemoglobin A1c 11/08/2019    Subacute thyroiditis 09/21/2018    Acute exacerbation of chronic obstructive airways disease (HCC) 09/12/2018    Lung nodules 09/12/2018    Aortic aneurysm (HCC) 09/12/2018    Cardiomyopathy (HCC) 08/08/2018    Essential hypertension 07/26/2018    History of MI (myocardial infarction) 07/26/2018    Left elbow pain 07/26/2018    Cigarette nicotine dependence without complication 07/26/2018    Cough 07/26/2018    Pacemaker 07/26/2018    Chronic atrial fibrillation (HCC) 07/26/2018    Mixed hyperlipidemia 07/26/2018         Current Medications          Current Outpatient Medications   Medication Sig Dispense Refill    metoprolol SR (TOPROL XL) 50 MG TABLET SR 24 HR Take 1 Tablet by mouth 2 times a day.        meloxicam (MOBIC) 7.5 MG Tab Take 1 Tablet by mouth every day. 30 Tablet 1     albuterol 108 (90 Base) MCG/ACT Aero Soln inhalation aerosol Inhale 2 Puffs every 6 hours as needed for Shortness of Breath. 1 Each 1    isosorbide mononitrate SR (IMDUR) 30 MG TABLET SR 24 HR Take 30 mg by mouth every day.        rosuvastatin (CRESTOR) 40 MG tablet Take 40 mg by mouth every day.        levothyroxine (SYNTHROID) 88 MCG Tab Take 1 Tablet by mouth every morning on an empty stomach. 90 Tablet 3    potassium chloride ER (KLOR-CON) 10 MEQ tablet Take 1 Tablet by mouth 2 times a day. 14 Tablet 0    ammonium lactate (LAC-HYDRIN) 12 % Lotion Apply 1 Application topically 3 times a day as needed (dry skin). 225 g 1    nitroglycerin (NITROSTAT) 0.4 MG SL Tab Place 0.4 mg under the tongue as needed.        lisinopril (PRINIVIL) 5 MG Tab Take 5 mg by mouth every day.        digoxin (LANOXIN) 125 MCG Tab Take 125 mcg by mouth every day. For 90 days        apixaban (ELIQUIS) 5mg Tab Take 5 mg by mouth 2 Times a Day.        aspirin EC (ECOTRIN) 81 MG Tablet Delayed Response Take 81 mg by mouth every day.        magnesium oxide (MAG-OX) 400 MG Tab Take 400 mg by mouth every day.          No current facility-administered medications for this visit.                       Patient is taking medications as noted in medication list.  Current supplements as per medication list.      Allergies: Patient has no known allergies.     Current social contact/activities: Walk about 1 mile every 3 to 4 days.  Pt lives in the house, pt lives with wife. Family in the area. Wife's family local, son and sister local. Pt enjoys TV and reading news on the computer.     Is patient current with immunizations? No, due for FLU. Patient is interested in receiving FLU today.     He  reports that he has been smoking cigarettes. He has a 22.50 pack-year smoking history. He has never used smokeless tobacco. He reports current alcohol use of about 0.6 oz per week. He reports that he does not use drugs.  Ready to quit: Not Answered  Counseling  given: Not Answered  Tobacco comments: Working on quitting         DPA/Advanced directive: Patient has Advanced Directive and Living Will, but it is not on file. Instructed to bring in a copy to scan into their chart.     ROS:    Gait: Uses no assistive device   Ostomy: No   Other tubes: No   Amputations: No   Chronic oxygen use No   Last eye exam 6 months ago   Wears hearing aids: No   : Denies any urinary leakage during the last 6 months        Screening:     Depression Screening  Little interest or pleasure in doing things?     Feeling down, depressed, or hopeless?    Trouble falling or staying asleep, or sleeping too much?     Feeling tired or having little energy?     Poor appetite or overeating?     Feeling bad about yourself - or that you are a failure or have let yourself or your family down?    Trouble concentrating on things, such as reading the newspaper or watching television?    Moving or speaking so slowly that other people could have noticed.  Or the opposite - being so fidgety or restless that you have been moving around a lot more than usual?     Thoughts that you would be better off dead, or of hurting yourself?     Patient Health Questionnaire Score:       If depressive symptoms identified deferred to follow up visit unless specifically addressed in assessment and plan.     Interpretation of PHQ-9 Total Score   Score Severity   1-4 No Depression   5-9 Mild Depression   10-14 Moderate Depression   15-19 Moderately Severe Depression   20-27 Severe Depression        Screening for Cognitive Impairment  Three Minute Recall (daughter, heaven, devon)  3/3    Draw clock face with all 12 numbers and set the hands to show 10 past 11.  Yes    If cognitive concerns identified, deferred for follow up unless specifically addressed in assessment and plan.     Fall Risk Assessment  Has the patient had two or more falls in the last year or any fall with injury in the last year?  No  If fall risk identified,  deferred for follow up unless specifically addressed in assessment and plan.     Safety Assessment  Throw rugs on floor.  Yes  Handrails on all stairs.  No  Good lighting in all hallways.  Yes  Difficulty hearing.  Yes  Patient counseled about all safety risks that were identified.     Functional Assessment ADLs  Are there any barriers preventing you from cooking for yourself or meeting nutritional needs?  No.    Are there any barriers preventing you from driving safely or obtaining transportation?  No.    Are there any barriers preventing you from using a telephone or calling for help?  No.    Are there any barriers preventing you from shopping?  No.    Are there any barriers preventing you from taking care of your own finances?  No.    Are there any barriers preventing you from managing your medications?    No.    Are there any barriers preventing you from showering, bathing or dressing yourself?  No.    Are you currently engaging in any exercise or physical activity?  Yes.  Walk about 1 mile every 3 to 4 days  What is your perception of your health?  Fair.     Health Maintenance Summary              Overdue - Annual Wellness Visit (Every 366 Days) Overdue - never done        No completion history exists for this topic.                  Overdue - IMM HEP B VACCINE (1 of 3 - 3-dose series) Overdue - never done        No completion history exists for this topic.                  Ordered - Annual Pulmonary Function Test / Spirometry (Yearly) Ordered on 1/12/2022        01/10/2019   [facility] PULMONARY FUNCTION TEST/LAB                  Overdue - COVID-19 Vaccine (4 - Booster for Moderna series) Overdue since 3/15/2022        11/15/2021   Imm Admin: Moderna SARS-CoV-2 Vaccine     03/12/2021   Imm Admin: Moderna SARS-CoV-2 Vaccine     02/12/2021   Imm Admin: Moderna SARS-CoV-2 Vaccine                  Overdue - IMM INFLUENZA (1) Overdue since 9/1/2022        10/21/2021   Imm Admin: Influenza Vaccine Adult HD     10/26/2020    Imm Admin: Influenza Vaccine Adult HD     11/08/2019   Imm Admin: Influenza Vaccine Adult HD     09/12/2018   Imm Admin: Influenza Vaccine Adult HD     01/25/2018   Imm Admin: Influenza (IM) Preservative Free - HISTORICAL DATA                  Postponed - HEPATITIS C SCREENING (Once) Postponed until 12/9/2022        No completion history exists for this topic.                  IMM DTaP/Tdap/Td Vaccine (2 - Td or Tdap) Next due on 7/26/2028 07/26/2018   Imm Admin: Tdap Vaccine                  COLORECTAL CANCER SCREENING (COLONOSCOPY - Every 10 Years) Next due on 10/18/2028        10/18/2018   Surgical Procedure: COLONOSCOPY     12/23/2008   REFERRAL TO GI FOR COLONOSCOPY                  IMM ZOSTER VACCINES (Series Information) Completed        01/09/2020   Imm Admin: Zoster Vaccine Recombinant (RZV) (SHINGRIX)     11/08/2019   Imm Admin: Zoster Vaccine Recombinant (RZV) (SHINGRIX)                  IMM PNEUMOCOCCAL VACCINE: 65+ Years (Series Information) Completed        10/26/2020   Imm Admin: Pneumococcal polysaccharide vaccine (PPSV-23)     07/26/2018   Imm Admin: Pneumococcal Conjugate Vaccine (Prevnar/PCV-13)                  ABDOMINAL AORTIC ANEURYSM (AAA) SCREEN  Completed        12/09/2021   Visit Dx: Abdominal aortic aneurysm (AAA) without rupture (HCC)     12/03/2021   Visit Dx: Abdominal aortic aneurysm (AAA) without rupture (HCC)     12/03/2021   US-AORTA/ILIACS DUPLEX COMPLETE     10/28/2021   Visit Dx: Abdominal aortic aneurysm (AAA) without rupture (HCC)     04/02/2021   Prob Dx: Abdominal aortic aneurysm (AAA) without rupture (HCC)     Only the first 5 history entries have been loaded, but more history exists.                  IMM MENINGOCOCCAL ACWY VACCINE (Series Information) Aged Out        No completion history exists for this topic.                          Patient Care Team:  ROSALIA Milligan as PCP - General (Family Medicine)  ROSALIA Milligan as PCP - [facility]  Paneled  Shweta Cantor M.D.   Alfredo Read DO (Cardiovascular Disease (Cardiology))  Alfredo Read D.O. (Cardiovascular Disease (Cardiology))  Ryley Gupta Ass't as [facility]      Social History            Tobacco Use    Smoking status: Light Smoker       Packs/day: 0.50       Years: 45.00       Pack years: 22.50       Types: Cigarettes    Smokeless tobacco: Never    Tobacco comments:       Working on quitting    Vaping Use    Vaping Use: Never used   Substance Use Topics    Alcohol use: Yes       Alcohol/week: 0.6 oz       Types: 1 Cans of beer per week       Comment: monthly    Drug use: No      Family History         Family History   Problem Relation Age of Onset    Heart Attack Mother      Heart Disease Mother      Hypertension Mother      Cancer Father      Heart Attack Father           s/p CABG    Heart Disease Father           AAA    Hypertension Father      Heart Attack Paternal Grandmother      Heart Attack Paternal Grandfather           He  has a past medical history of Anesthesia, Arthritis, Atrial fibrillation (HCC), Chickenpox, Chronic bronchitis (HCC), Dental disorder, Disorder of thyroid, Emphysema of lung (HCC), Polish measles, Hemorrhagic disorder (HCC), High cholesterol, Hypertension, Influenza, Mumps, Myocardial infarct (HCC) (87, ?89, 2007), Other and unspecified angina pectoris (10/2016), Pacemaker (10/20/2016), Pain, and PONV (postoperative nausea and vomiting).     He has no past medical history of Acute nasopharyngitis, Blood clotting disorder (HCC), Bowel habit changes, Breath shortness, Cancer (HCC), Carcinoma in situ of respiratory system, Congestive heart failure (HCC), Continuous ambulatory peritoneal dialysis status (HCC), Coughing blood, Diabetes (HCC), Dialysis patient (HCC), Glaucoma, Gynecological disorder, Heart burn, Heart murmur, Heart valve disease, Hepatitis A, Hepatitis B, Hepatitis C, Hiatus hernia syndrome, Indigestion, Jaundice, Pneumonia,  "Pregnant, Psychiatric problem, Renal disorder, Rheumatic fever, Seizure (HCC), Sleep apnea, Snoring, Stroke (HCC), Tuberculosis, Urinary bladder disorder, or Urinary incontinence.   Past Surgical History         Past Surgical History:   Procedure Laterality Date    COLONOSCOPY   10/18/2018     Procedure: COLONOSCOPY - W/POSS BX, DILATION, POLYPECTOMY, CONTROL OF HEMORRHAGE;  Surgeon: Pineda Horvath M.D.;  Location: SURGERY [facility];  Service: EUS    PACEMAKER INSERTION   10/20/2016    INGUINAL HERNIA REPAIR BILATERAL   3/2/2015     Performed by Jack Tobias M.D. at SURGERY [facility] DAY [facility]    COLONOSCOPY   2008    MULTIPLE CORONARY ARTERY BYPASS   1991     stents (X 3 placement= stents total)    APPENDECTOMY CHILD        OTHER ORTHOPEDIC SURGERY         knee arthoscopy, bilaterally (\"early 80's\")    TONSILLECTOMY   as a child               Exam:   /62 (BP Location: Left arm, Patient Position: Sitting, BP Cuff Size: Adult)   Pulse 84   Temp 36.6 °C (97.9 °F) (Temporal)   Resp 16   Ht 1.772 m (5' 9.75\")   Wt 88.3 kg (194 lb 9.6 oz)   SpO2 95%  Body mass index is 28.12 kg/m².     Hearing good.    Dentition upper and lower dentures  Alert, oriented in no acute distress  Eye contact is good, speech goal directed, affect calm        Assessment and Plan. The following treatment and monitoring plan is recommended:    There are no diagnoses linked to this encounter.      Services suggested: No services needed at this time  Health Care Screening recommendations as per orders if indicated.  Referrals offered: PT/OT/Nutrition counseling/Behavioral Health/Smoking cessation as per orders if indicated.     Discussion today about general wellness and lifestyle habits:    Prevent falls and reduce trip hazards; Cautioned about securing or removing rugs.  Have a working fire alarm and carbon monoxide detector;   Engage in regular physical activity and social activities.      Follow-up: No follow-ups on " file.              Revision History

## 2022-09-29 ENCOUNTER — PATIENT MESSAGE (OUTPATIENT)
Dept: HEALTH INFORMATION MANAGEMENT | Facility: OTHER | Age: 72
End: 2022-09-29

## 2022-10-03 DIAGNOSIS — M25.552 HIP PAIN, BILATERAL: ICD-10-CM

## 2022-10-03 DIAGNOSIS — M25.551 HIP PAIN, BILATERAL: ICD-10-CM

## 2022-10-03 RX ORDER — MELOXICAM 7.5 MG/1
TABLET ORAL
Qty: 30 TABLET | Refills: 0 | Status: SHIPPED | OUTPATIENT
Start: 2022-10-03 | End: 2022-10-13

## 2022-10-13 ENCOUNTER — OFFICE VISIT (OUTPATIENT)
Dept: MEDICAL GROUP | Facility: PHYSICIAN GROUP | Age: 72
End: 2022-10-13
Payer: MEDICARE

## 2022-10-13 VITALS
TEMPERATURE: 96.7 F | RESPIRATION RATE: 16 BRPM | HEIGHT: 70 IN | BODY MASS INDEX: 28.77 KG/M2 | SYSTOLIC BLOOD PRESSURE: 122 MMHG | DIASTOLIC BLOOD PRESSURE: 70 MMHG | WEIGHT: 201 LBS | HEART RATE: 60 BPM | OXYGEN SATURATION: 97 %

## 2022-10-13 DIAGNOSIS — Z23 NEED FOR VACCINATION: ICD-10-CM

## 2022-10-13 DIAGNOSIS — M25.552 HIP PAIN, BILATERAL: ICD-10-CM

## 2022-10-13 DIAGNOSIS — M25.551 HIP PAIN, BILATERAL: ICD-10-CM

## 2022-10-13 PROCEDURE — G0008 ADMIN INFLUENZA VIRUS VAC: HCPCS | Performed by: NURSE PRACTITIONER

## 2022-10-13 PROCEDURE — 99214 OFFICE O/P EST MOD 30 MIN: CPT | Mod: 25 | Performed by: NURSE PRACTITIONER

## 2022-10-13 PROCEDURE — 90662 IIV NO PRSV INCREASED AG IM: CPT | Performed by: NURSE PRACTITIONER

## 2022-10-13 RX ORDER — MELOXICAM 15 MG/1
15 TABLET ORAL DAILY
Qty: 30 TABLET | Refills: 1 | Status: SHIPPED | OUTPATIENT
Start: 2022-10-13 | End: 2023-05-24

## 2022-10-13 RX ORDER — ROSUVASTATIN CALCIUM 40 MG/1
1 TABLET, COATED ORAL DAILY
COMMUNITY
Start: 2022-10-03 | End: 2022-10-13

## 2022-10-13 ASSESSMENT — FIBROSIS 4 INDEX: FIB4 SCORE: 2.54

## 2022-10-13 NOTE — PROGRESS NOTES
Chief Complaint   Patient presents with    Follow-Up     Hip pain, about the same        HISTORY OF PRESENT ILLNESS: Patient is a 72 y.o. male, established patient who presents today to discuss medical problems as listed below:    Health Maintenance:  COMPLETED     Hip pain, bilateral  Chronic problem.  This has been going on for years, in August imaging revealed moderate degenerative changes in bilateral hips greater on the left side.  Patient reports more pain on the right.  Patient is returning today as his pain has not improved.   Patient did not go to physical therapy as he does not believe in it.  He did start on meloxicam 7.5 mg once daily in p.m.    Patient Active Problem List    Diagnosis Date Noted    Pulmonary emphysema (HCC) 08/12/2022    Hip pain, bilateral 07/29/2022    Bilateral edema of lower extremity 07/29/2022    Xerosis of skin 04/29/2022    H/O cardiac radiofrequency ablation: 11/22/21.  RFA of 3 different VT morphologies from lateral wall scar.  Dr Campbell  11/23/2021    Current every day smoker 10/21/2021    Peripheral cyanosis 06/04/2021    Chronic anticoagulation 04/02/2021    Hx of CABG 04/02/2021    Coronary artery disease involving native coronary artery of native heart without angina pectoris 04/02/2021    Chronic bronchitis (HCC) 03/10/2021    Decreased hearing of both ears 03/10/2021    AICD (automatic cardioverter/defibrillator) present 12/09/2020    Ventricular tachycardia 12/09/2020    Hypothyroidism 11/27/2019    Vitamin D deficiency 11/27/2019    Elevated hemoglobin A1c 11/08/2019    Subacute thyroiditis 09/21/2018    Acute exacerbation of chronic obstructive airways disease (HCC) 09/12/2018    Lung nodules 09/12/2018    Aortic aneurysm (HCC) 09/12/2018    Cardiomyopathy (HCC) 08/08/2018    Essential hypertension 07/26/2018    History of MI (myocardial infarction) 07/26/2018    Left elbow pain 07/26/2018    Cigarette nicotine dependence without complication 07/26/2018    Cough  07/26/2018    Pacemaker 07/26/2018    Chronic atrial fibrillation (HCC) 07/26/2018    Mixed hyperlipidemia 07/26/2018        Allergies: Patient has no known allergies.    Current Outpatient Medications   Medication Sig Dispense Refill    meloxicam (MOBIC) 15 MG tablet Take 1 Tablet by mouth every day. 30 Tablet 1    metoprolol SR (TOPROL XL) 50 MG TABLET SR 24 HR Take 1 Tablet by mouth 2 times a day.      albuterol 108 (90 Base) MCG/ACT Aero Soln inhalation aerosol Inhale 2 Puffs every 6 hours as needed for Shortness of Breath. 1 Each 1    isosorbide mononitrate SR (IMDUR) 30 MG TABLET SR 24 HR Take 30 mg by mouth every day.      rosuvastatin (CRESTOR) 40 MG tablet Take 40 mg by mouth every day.      levothyroxine (SYNTHROID) 88 MCG Tab Take 1 Tablet by mouth every morning on an empty stomach. 90 Tablet 3    potassium chloride ER (KLOR-CON) 10 MEQ tablet Take 1 Tablet by mouth 2 times a day. 14 Tablet 0    ammonium lactate (LAC-HYDRIN) 12 % Lotion Apply 1 Application topically 3 times a day as needed (dry skin). 225 g 1    nitroglycerin (NITROSTAT) 0.4 MG SL Tab Place 0.4 mg under the tongue as needed.      lisinopril (PRINIVIL) 5 MG Tab Take 5 mg by mouth every day.      digoxin (LANOXIN) 125 MCG Tab Take 125 mcg by mouth every day. For 90 days      apixaban (ELIQUIS) 5mg Tab Take 5 mg by mouth 2 Times a Day.      aspirin EC (ECOTRIN) 81 MG Tablet Delayed Response Take 81 mg by mouth every day.      magnesium oxide (MAG-OX) 400 MG Tab Take 400 mg by mouth every day.       No current facility-administered medications for this visit.       Social History     Tobacco Use    Smoking status: Light Smoker     Packs/day: 0.50     Years: 45.00     Pack years: 22.50     Types: Cigarettes    Smokeless tobacco: Never    Tobacco comments:     Working on quitting    Vaping Use    Vaping Use: Never used   Substance Use Topics    Alcohol use: Yes     Alcohol/week: 0.6 oz     Types: 1 Cans of beer per week     Comment: monthly     "Drug use: No     Social History     Social History Narrative    Not on file       Family History   Problem Relation Age of Onset    Heart Attack Mother     Heart Disease Mother     Hypertension Mother     Cancer Father     Heart Attack Father         s/p CABG    Heart Disease Father         AAA    Hypertension Father     Heart Attack Paternal Grandmother     Heart Attack Paternal Grandfather        Allergies, past medical history, past surgical history, family history, social history reviewed and updated.    Review of Systems:     - Constitutional: Negative for fever, chills, unexpected weight change, and fatigue/generalized weakness.       - Respiratory: Negative for cough, sputum production, chest congestion, dyspnea, wheezing, and crackles.      - Cardiovascular: Negative for chest pain, palpitations, orthopnea, and bilateral lower extremity edema.     - Psychiatric/Behavioral: Negative for depression, suicidal/homicidal ideation and memory loss.      All other systems reviewed and are negative    Exam:    /70   Pulse 60   Temp 35.9 °C (96.7 °F) (Temporal)   Resp 16   Ht 1.772 m (5' 9.75\")   Wt 91.2 kg (201 lb)   SpO2 97%   BMI 29.05 kg/m²  Body mass index is 29.05 kg/m².    Physical Exam:  Constitutional: Well-developed and well-nourished. Not diaphoretic. No distress.   Cardiovascular: Regular rate and rhythm, S1 and S2 without murmur, rubs, or gallops.    Chest: Effort normal. Clear to auscultation throughout. No adventitious sounds. Neurological: Alert and oriented x 3.   Psychiatric:  Behavior, mood, and affect are appropriate.  MA/nursing note and vitals reviewed.    Assessment/Plan:  1. Need for vaccination  - Influenza Vaccine, High Dose (65+ Only)    2. Hip pain, bilateral  No improvement. Pt was advised to increase the dose for Meloxicam last visit if initial dose is inadequate also start PT.  Patient declines PT and forgot that he can increase his medication dose.  Prescription for " meloxicam 15 mg given, take with food to avoid potential risk of GI bleed.  Also advised trying OTC capsaicin topical, avoid contact with face, desirable using gloves before application.  Patient to follow-up with orthopedics.  - Referral to Orthopedics  - meloxicam (MOBIC) 15 MG tablet; Take 1 Tablet by mouth every day.  Dispense: 30 Tablet; Refill: 1       Discussed with patient possible alternative diagnoses, patient is to take all medications as prescribed.      If symptoms persist FU w/PCP, if symptoms worsen go to emergency room.      If experiencing any side effects from prescribed medications report to the office immediately or go to emergency room.     Reviewed indication, dosage, usage and potential adverse effects of prescribed medications.      Reviewed risks and benefits of treatment plan. Patient verbalizes understanding of all instruction and verbally agrees to plan.     Discussed plan with the patient, and patient agrees to the above.      I personally reviewed prior external notes and test results pertinent to today's visit.      No follow-ups on file. 2 mos

## 2022-10-13 NOTE — ASSESSMENT & PLAN NOTE
Chronic problem.  This has been going on for years, in August imaging revealed moderate degenerative changes in bilateral hips greater on the left side.  Patient reports more pain on the right.  Patient is returning today as his pain has not improved.   Patient did not go to physical therapy as he does not believe in it.  He did start on meloxicam 7.5 mg once daily in p.m.

## 2022-12-07 NOTE — PROGRESS NOTES
Patient updated by RN of results of LDCT exam performed 9/10/2018.  Notified that the results showed small nodules and a referral to Pulmonary Nodule Clinic would be completed in accordance with the discussion at the Lung Cancer Screening Visit.    Incidental findings were: aortic aneurysm  [facility] referring Provider, ASHTYN Rodriguez, has been notified as well.    To monitor for nodule changes, a follow-up low-dose LDCT is recommended in approximately 6 months, sooner as determined per [facility] Provider.     Patient agrees to all recommendations.   None

## 2023-05-23 SDOH — ECONOMIC STABILITY: INCOME INSECURITY: IN THE LAST 12 MONTHS, WAS THERE A TIME WHEN YOU WERE NOT ABLE TO PAY THE MORTGAGE OR RENT ON TIME?: PATIENT REFUSED

## 2023-05-23 SDOH — ECONOMIC STABILITY: TRANSPORTATION INSECURITY
IN THE PAST 12 MONTHS, HAS LACK OF RELIABLE TRANSPORTATION KEPT YOU FROM MEDICAL APPOINTMENTS, MEETINGS, WORK OR FROM GETTING THINGS NEEDED FOR DAILY LIVING?: PATIENT DECLINED

## 2023-05-23 SDOH — ECONOMIC STABILITY: HOUSING INSECURITY

## 2023-05-23 SDOH — HEALTH STABILITY: PHYSICAL HEALTH: ON AVERAGE, HOW MANY MINUTES DO YOU ENGAGE IN EXERCISE AT THIS LEVEL?: 30 MIN

## 2023-05-23 SDOH — HEALTH STABILITY: PHYSICAL HEALTH: ON AVERAGE, HOW MANY DAYS PER WEEK DO YOU ENGAGE IN MODERATE TO STRENUOUS EXERCISE (LIKE A BRISK WALK)?: 3 DAYS

## 2023-05-23 SDOH — ECONOMIC STABILITY: TRANSPORTATION INSECURITY
IN THE PAST 12 MONTHS, HAS THE LACK OF TRANSPORTATION KEPT YOU FROM MEDICAL APPOINTMENTS OR FROM GETTING MEDICATIONS?: PATIENT DECLINED

## 2023-05-23 SDOH — ECONOMIC STABILITY: FOOD INSECURITY: WITHIN THE PAST 12 MONTHS, THE FOOD YOU BOUGHT JUST DIDN'T LAST AND YOU DIDN'T HAVE MONEY TO GET MORE.: PATIENT DECLINED

## 2023-05-23 SDOH — ECONOMIC STABILITY: TRANSPORTATION INSECURITY
IN THE PAST 12 MONTHS, HAS LACK OF TRANSPORTATION KEPT YOU FROM MEETINGS, WORK, OR FROM GETTING THINGS NEEDED FOR DAILY LIVING?: PATIENT DECLINED

## 2023-05-23 SDOH — ECONOMIC STABILITY: FOOD INSECURITY: WITHIN THE PAST 12 MONTHS, YOU WORRIED THAT YOUR FOOD WOULD RUN OUT BEFORE YOU GOT MONEY TO BUY MORE.: PATIENT DECLINED

## 2023-05-23 SDOH — HEALTH STABILITY: MENTAL HEALTH
STRESS IS WHEN SOMEONE FEELS TENSE, NERVOUS, ANXIOUS, OR CAN'T SLEEP AT NIGHT BECAUSE THEIR MIND IS TROUBLED. HOW STRESSED ARE YOU?: NOT AT ALL

## 2023-05-23 SDOH — ECONOMIC STABILITY: HOUSING INSECURITY
IN THE LAST 12 MONTHS, WAS THERE A TIME WHEN YOU DID NOT HAVE A STEADY PLACE TO SLEEP OR SLEPT IN A SHELTER (INCLUDING NOW)?: PATIENT REFUSED

## 2023-05-23 SDOH — ECONOMIC STABILITY: INCOME INSECURITY: HOW HARD IS IT FOR YOU TO PAY FOR THE VERY BASICS LIKE FOOD, HOUSING, MEDICAL CARE, AND HEATING?: NOT HARD AT ALL

## 2023-05-23 ASSESSMENT — SOCIAL DETERMINANTS OF HEALTH (SDOH)
DO YOU BELONG TO ANY CLUBS OR ORGANIZATIONS SUCH AS CHURCH GROUPS UNIONS, FRATERNAL OR ATHLETIC GROUPS, OR SCHOOL GROUPS?: NO
HOW OFTEN DO YOU ATTENT MEETINGS OF THE CLUB OR ORGANIZATION YOU BELONG TO?: NEVER
DO YOU BELONG TO ANY CLUBS OR ORGANIZATIONS SUCH AS CHURCH GROUPS UNIONS, FRATERNAL OR ATHLETIC GROUPS, OR SCHOOL GROUPS?: NO
HOW OFTEN DO YOU HAVE A DRINK CONTAINING ALCOHOL: NEVER
HOW OFTEN DO YOU ATTENT MEETINGS OF THE CLUB OR ORGANIZATION YOU BELONG TO?: NEVER
WITHIN THE PAST 12 MONTHS, YOU WORRIED THAT YOUR FOOD WOULD RUN OUT BEFORE YOU GOT THE MONEY TO BUY MORE: PATIENT DECLINED
HOW MANY DRINKS CONTAINING ALCOHOL DO YOU HAVE ON A TYPICAL DAY WHEN YOU ARE DRINKING: PATIENT DOES NOT DRINK
HOW HARD IS IT FOR YOU TO PAY FOR THE VERY BASICS LIKE FOOD, HOUSING, MEDICAL CARE, AND HEATING?: NOT HARD AT ALL
HOW OFTEN DO YOU ATTEND CHURCH OR RELIGIOUS SERVICES?: NEVER
HOW OFTEN DO YOU GET TOGETHER WITH FRIENDS OR RELATIVES?: ONCE A WEEK
HOW OFTEN DO YOU GET TOGETHER WITH FRIENDS OR RELATIVES?: ONCE A WEEK
HOW OFTEN DO YOU HAVE SIX OR MORE DRINKS ON ONE OCCASION: NEVER
IN A TYPICAL WEEK, HOW MANY TIMES DO YOU TALK ON THE PHONE WITH FAMILY, FRIENDS, OR NEIGHBORS?: ONCE A WEEK
IN A TYPICAL WEEK, HOW MANY TIMES DO YOU TALK ON THE PHONE WITH FAMILY, FRIENDS, OR NEIGHBORS?: ONCE A WEEK
HOW OFTEN DO YOU ATTEND CHURCH OR RELIGIOUS SERVICES?: NEVER

## 2023-05-23 ASSESSMENT — LIFESTYLE VARIABLES
HOW OFTEN DO YOU HAVE SIX OR MORE DRINKS ON ONE OCCASION: NEVER
HOW OFTEN DO YOU HAVE A DRINK CONTAINING ALCOHOL: NEVER
SKIP TO QUESTIONS 9-10: 1
AUDIT-C TOTAL SCORE: 0
HOW MANY STANDARD DRINKS CONTAINING ALCOHOL DO YOU HAVE ON A TYPICAL DAY: PATIENT DOES NOT DRINK

## 2023-05-24 ENCOUNTER — OFFICE VISIT (OUTPATIENT)
Dept: MEDICAL GROUP | Facility: PHYSICIAN GROUP | Age: 73
End: 2023-05-24
Payer: MEDICARE

## 2023-05-24 VITALS
SYSTOLIC BLOOD PRESSURE: 122 MMHG | RESPIRATION RATE: 12 BRPM | DIASTOLIC BLOOD PRESSURE: 74 MMHG | HEART RATE: 72 BPM | HEIGHT: 69 IN | OXYGEN SATURATION: 97 % | BODY MASS INDEX: 28.73 KG/M2 | WEIGHT: 194 LBS | TEMPERATURE: 96.5 F

## 2023-05-24 DIAGNOSIS — F17.210 CIGARETTE NICOTINE DEPENDENCE WITHOUT COMPLICATION: ICD-10-CM

## 2023-05-24 DIAGNOSIS — I25.2 HISTORY OF MI (MYOCARDIAL INFARCTION): ICD-10-CM

## 2023-05-24 DIAGNOSIS — M54.42 CHRONIC BILATERAL LOW BACK PAIN WITH BILATERAL SCIATICA: ICD-10-CM

## 2023-05-24 DIAGNOSIS — R91.8 LUNG NODULES: ICD-10-CM

## 2023-05-24 DIAGNOSIS — H02.9 LESION OF LEFT EYELID: ICD-10-CM

## 2023-05-24 DIAGNOSIS — G89.29 CHRONIC BILATERAL LOW BACK PAIN WITH BILATERAL SCIATICA: ICD-10-CM

## 2023-05-24 DIAGNOSIS — I25.10 CORONARY ARTERY DISEASE INVOLVING NATIVE CORONARY ARTERY OF NATIVE HEART WITHOUT ANGINA PECTORIS: ICD-10-CM

## 2023-05-24 DIAGNOSIS — J43.9 PULMONARY EMPHYSEMA, UNSPECIFIED EMPHYSEMA TYPE (HCC): ICD-10-CM

## 2023-05-24 DIAGNOSIS — H91.93 DECREASED HEARING OF BOTH EARS: ICD-10-CM

## 2023-05-24 DIAGNOSIS — F17.200 CURRENT EVERY DAY SMOKER: ICD-10-CM

## 2023-05-24 DIAGNOSIS — M54.41 CHRONIC BILATERAL LOW BACK PAIN WITH BILATERAL SCIATICA: ICD-10-CM

## 2023-05-24 DIAGNOSIS — E03.9 HYPOTHYROIDISM, UNSPECIFIED TYPE: ICD-10-CM

## 2023-05-24 DIAGNOSIS — D22.10 NEVUS OF LEFT EYELID: ICD-10-CM

## 2023-05-24 DIAGNOSIS — H91.93 HIGH FREQUENCY HEARING LOSS OF BOTH EARS: ICD-10-CM

## 2023-05-24 PROBLEM — R05.9 COUGH: Status: RESOLVED | Noted: 2018-07-26 | Resolved: 2023-05-24

## 2023-05-24 PROBLEM — E06.1 SUBACUTE THYROIDITIS: Status: RESOLVED | Noted: 2018-09-21 | Resolved: 2023-05-24

## 2023-05-24 PROBLEM — R60.0 BILATERAL EDEMA OF LOWER EXTREMITY: Status: RESOLVED | Noted: 2022-07-29 | Resolved: 2023-05-24

## 2023-05-24 PROBLEM — M25.522 LEFT ELBOW PAIN: Status: RESOLVED | Noted: 2018-07-26 | Resolved: 2023-05-24

## 2023-05-24 PROCEDURE — 3074F SYST BP LT 130 MM HG: CPT | Performed by: STUDENT IN AN ORGANIZED HEALTH CARE EDUCATION/TRAINING PROGRAM

## 2023-05-24 PROCEDURE — 99214 OFFICE O/P EST MOD 30 MIN: CPT | Performed by: STUDENT IN AN ORGANIZED HEALTH CARE EDUCATION/TRAINING PROGRAM

## 2023-05-24 PROCEDURE — 3078F DIAST BP <80 MM HG: CPT | Performed by: STUDENT IN AN ORGANIZED HEALTH CARE EDUCATION/TRAINING PROGRAM

## 2023-05-24 RX ORDER — SPIRONOLACTONE 25 MG/1
TABLET ORAL
COMMUNITY
Start: 2023-05-22

## 2023-05-24 RX ORDER — FUROSEMIDE 20 MG/1
TABLET ORAL
COMMUNITY
Start: 2023-05-16

## 2023-05-24 RX ORDER — DIGOXIN 125 MCG
1 TABLET ORAL DAILY
COMMUNITY
Start: 2023-01-11 | End: 2024-02-13

## 2023-05-24 RX ORDER — MAGNESIUM OXIDE 400 MG/1
1 TABLET ORAL DAILY
COMMUNITY
Start: 2023-04-10

## 2023-05-24 ASSESSMENT — ENCOUNTER SYMPTOMS
DIZZINESS: 0
CHILLS: 0
ORTHOPNEA: 0
BLURRED VISION: 0
SHORTNESS OF BREATH: 0
ABDOMINAL PAIN: 0
COUGH: 0
PALPITATIONS: 0
HEADACHES: 0
WEIGHT LOSS: 0
FEVER: 0
WHEEZING: 0

## 2023-05-24 ASSESSMENT — FIBROSIS 4 INDEX: FIB4 SCORE: 2.57

## 2023-05-24 ASSESSMENT — PATIENT HEALTH QUESTIONNAIRE - PHQ9: CLINICAL INTERPRETATION OF PHQ2 SCORE: 0

## 2023-05-24 NOTE — ASSESSMENT & PLAN NOTE
Reports occasional chest pain monitored by cardiology.  Continue metoprolol 50 mg twice daily, Imdur, aspirin, lisinopril, Eliquis, Crestor, spironolactone, Lasix, nitroglycerin as needed, digoxin    Chronic, stable condition management cardiology Dr. Read

## 2023-05-24 NOTE — PROGRESS NOTES
Subjective:   HISTORY OF THE PRESENT ILLNESS: Patient is a 73 y.o. male here today to establish care.     Problem   Chronic Bilateral Low Back Pain With Bilateral Sciatica   Pulmonary Emphysema (Hcc)    Uses albuterol rescue inhaler occasionally not every day or every night.  Still smoking about 6 cigarettes a day       Current Every Day Smoker    50 pack years  6 cigs /day currently         Coronary Artery Disease Involving Native Coronary Artery of Native Heart Without Angina Pectoris   Hypothyroidism    Last TSH, T4 within normal limits 6 months ago.  Currently on levothyroxine 88 mcg daily.       History of MI (Myocardial Infarction)    1988 for MI, several following that   Pacemaker  CABG x 3 stents   Now followed by Dr. Read at least every 6 months.     Currently on aldactone 25, digoxin 125mcg, troprol 50, lisinopril, eliquis 5mg, asa, imdur.        Cigarette Nicotine Dependence Without Complication    45-pack-year history.  Now down to 6 cigarettes a day.        Bilateral Edema of Lower Extremity (Resolved)   Subacute Thyroiditis (Resolved)   Left Elbow Pain (Resolved)   Cough (Resolved)        Current Outpatient Medications Ordered in Epic   Medication Sig Dispense Refill    spironolactone (ALDACTONE) 25 MG Tab       magnesium oxide (MAG-OX) 400 MG Tab tablet Take 1 Tablet by mouth every day.      furosemide (LASIX) 20 MG Tab TAKE 1 TABLET BY MOUTH ONCE DAILY AS NEEDED FOR ANKLE SWELLING      digoxin (LANOXIN) 125 MCG Tab Take 1 Tablet by mouth every day.      metoprolol SR (TOPROL XL) 50 MG TABLET SR 24 HR Take 1 Tablet by mouth 2 times a day.      albuterol 108 (90 Base) MCG/ACT Aero Soln inhalation aerosol Inhale 2 Puffs every 6 hours as needed for Shortness of Breath. 1 Each 1    rosuvastatin (CRESTOR) 40 MG tablet Take 40 mg by mouth every day.      levothyroxine (SYNTHROID) 88 MCG Tab Take 1 Tablet by mouth every morning on an empty stomach. 90 Tablet 3    ammonium lactate (LAC-HYDRIN) 12 % Lotion Apply  1 Application topically 3 times a day as needed (dry skin). 225 g 1    nitroglycerin (NITROSTAT) 0.4 MG SL Tab Place 0.4 mg under the tongue as needed.      lisinopril (PRINIVIL) 5 MG Tab Take 5 mg by mouth every day.      apixaban (ELIQUIS) 5mg Tab Take 5 mg by mouth 2 Times a Day.      aspirin EC (ECOTRIN) 81 MG Tablet Delayed Response Take 81 mg by mouth every day.      isosorbide mononitrate SR (IMDUR) 30 MG TABLET SR 24 HR Take 30 mg by mouth every day.       No current [facility]-ordered facility-administered medications on file.       Review of systems:  Review of Systems   Constitutional:  Negative for chills, fever and weight loss.   Eyes:  Negative for blurred vision.   Respiratory:  Negative for cough, shortness of breath and wheezing.    Cardiovascular:  Positive for chest pain. Negative for palpitations, orthopnea and leg swelling.   Gastrointestinal:  Negative for abdominal pain and melena.   Neurological:  Negative for dizziness and headaches.         Past Medical History:   Diagnosis Date    Arthritis     Atrial fibrillation (HCC)     CHF (congestive heart failure) (HCC)     Chronic bronchitis (HCC)     Disorder of thyroid     Hypothyroid    Emphysema of lung (HCC)     GERD (gastroesophageal reflux disease)     Hemorrhagic disorder (HCC)     On Blood thinners    Hyperlipidemia     Myocardial infarct (HCC) 88, ?89, 2007    x 3    Other and unspecified angina pectoris 10/2016    Defibulator    Pacemaker 10/20/2016    Pacemaker/Defib     Past Surgical History:   Procedure Laterality Date    COLONOSCOPY  10/18/2018    Procedure: COLONOSCOPY - W/POSS BX, DILATION, POLYPECTOMY, CONTROL OF HEMORRHAGE;  Surgeon: Pineda Horvath M.D.;  Location: SURGERY [facility];  Service: EUS    PACEMAKER INSERTION  10/20/2016    INGUINAL HERNIA REPAIR BILATERAL  03/02/2015    Performed by Jack Tobias M.D. at SURGERY SAME DAY [facility]    COLONOSCOPY  2008    MULTIPLE CORONARY ARTERY BYPASS  1991    stents (X 3  "placement= stents total)    APPENDECTOMY      APPENDECTOMY CHILD      OTHER ORTHOPEDIC SURGERY      knee arthoscopy, bilaterally (\"early 80's\")    TONSILLECTOMY  as a child     Social History     Tobacco Use    Smoking status: Some Days     Packs/day: 1.00     Years: 45.00     Pack years: 45.00     Types: Cigarettes    Smokeless tobacco: Never    Tobacco comments:     Working on quitting    Vaping Use    Vaping Use: Never used   Substance Use Topics    Alcohol use: Not Currently     Alcohol/week: 0.6 oz     Types: 1 Cans of beer per week     Comment: monthly    Drug use: No      Family History   Problem Relation Age of Onset    Heart Attack Mother     Heart Disease Mother     Hypertension Mother     Cancer Father     Heart Attack Father         s/p CABG    Heart Disease Father         AAA    Hypertension Father     Heart Attack Paternal Grandmother     Heart Attack Paternal Grandfather      Current Outpatient Medications   Medication Sig Dispense Refill    spironolactone (ALDACTONE) 25 MG Tab       magnesium oxide (MAG-OX) 400 MG Tab tablet Take 1 Tablet by mouth every day.      furosemide (LASIX) 20 MG Tab TAKE 1 TABLET BY MOUTH ONCE DAILY AS NEEDED FOR ANKLE SWELLING      digoxin (LANOXIN) 125 MCG Tab Take 1 Tablet by mouth every day.      metoprolol SR (TOPROL XL) 50 MG TABLET SR 24 HR Take 1 Tablet by mouth 2 times a day.      albuterol 108 (90 Base) MCG/ACT Aero Soln inhalation aerosol Inhale 2 Puffs every 6 hours as needed for Shortness of Breath. 1 Each 1    rosuvastatin (CRESTOR) 40 MG tablet Take 40 mg by mouth every day.      levothyroxine (SYNTHROID) 88 MCG Tab Take 1 Tablet by mouth every morning on an empty stomach. 90 Tablet 3    ammonium lactate (LAC-HYDRIN) 12 % Lotion Apply 1 Application topically 3 times a day as needed (dry skin). 225 g 1    nitroglycerin (NITROSTAT) 0.4 MG SL Tab Place 0.4 mg under the tongue as needed.      lisinopril (PRINIVIL) 5 MG Tab Take 5 mg by mouth every day.      " "apixaban (ELIQUIS) 5mg Tab Take 5 mg by mouth 2 Times a Day.      aspirin EC (ECOTRIN) 81 MG Tablet Delayed Response Take 81 mg by mouth every day.      isosorbide mononitrate SR (IMDUR) 30 MG TABLET SR 24 HR Take 30 mg by mouth every day.       No current facility-administered medications for this visit.       Allergies:  No Known Allergies    Allergies, past medical history, past surgical history, family history, social history reviewed and updated.    Objective:    /74 (BP Location: Left arm, Patient Position: Sitting, BP Cuff Size: Adult)   Pulse 72   Temp 35.8 °C (96.5 °F) (Temporal)   Resp 12   Ht 1.753 m (5' 9\")   Wt 88 kg (194 lb 0.1 oz)   SpO2 97%   BMI 28.65 kg/m²    Body mass index is 28.65 kg/m².    Physical exam:  General: Normal appearance, no acute distress, not ill-appearing  HEENT: EOM intact, conjunctiva normal limits, negative right/left eye discharge.  Sclera anicteric  Cardiovascular: Normal rate and rhythm, no murmurs  Pulmonary: No respiratory distress, no wheezing, no rales, breath sounds normal.  Musculoskeletal: No edema bilaterally  Skin: Warm, dry, no lesions  Neurological: No focal deficits, normal gait  Psychiatric: Mood within normal limits    Assessment/Plan:    Problem List Items Addressed This Visit       History of MI (myocardial infarction)    Cigarette nicotine dependence without complication    Relevant Orders    CT-CHEST LOW DOSE W/O    Lung nodules    Relevant Orders    CT-CHEST LOW DOSE W/O    Hypothyroidism    Decreased hearing of both ears    Relevant Orders    Referral to Audiology    Coronary artery disease involving native coronary artery of native heart without angina pectoris     Reports occasional chest pain monitored by cardiology.  Continue metoprolol 50 mg twice daily, Imdur, aspirin, lisinopril, Eliquis, Crestor, spironolactone, Lasix, nitroglycerin as needed, digoxin    Chronic, stable condition management cardiology Dr. Read           Relevant " Medications    spironolactone (ALDACTONE) 25 MG Tab    furosemide (LASIX) 20 MG Tab    digoxin (LANOXIN) 125 MCG Tab    Current every day smoker     Currently down to 6 cigarettes a day, reports she is decreasing by 1 cigarette every few weeks           Pulmonary emphysema (HCC)     Stable condition.  Advised on smoking cessation.  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 Continue with current treatment plan: Aspirin 81 mg, Crestor 40 mg.  Follow-up with vascular medicine as directed, but at least annually.  Diagnosis: J42 - Chronic bronchitis, unspecified chronic bronchitis type (HCC)  Assessment and plan: Chronic, stable. Continue with current defined treatment plan: Advised on smoking cessation, currently only needing rescue inhaler as needed, no hospitalizations follow-up at least annually.  Diagnosis: J43.9 - Pulmonary emphysema, unspecified emphysema type (HCC)  Assessment and plan: Chronic, stable. Continue with current defined treatment plan: Advised on smoking cessation, order pulmonary function test at next annual wellness visit, no exacerbations or hospitalizations within the last year.. Follow-up at least annually.  Last edited 05/24/23 13:48 PDT by Hero Hernández D.O.          Return in about 3 months (around 8/24/2023) for Annual wellness visit .

## 2023-05-24 NOTE — ASSESSMENT & PLAN NOTE
Stable condition.  Advised on smoking cessation.  Continue albuterol as needed no exacerbations in the last year

## 2023-06-29 ENCOUNTER — OFFICE VISIT (OUTPATIENT)
Dept: PHYSICAL MEDICINE AND REHAB | Facility: MEDICAL CENTER | Age: 73
End: 2023-06-29
Payer: MEDICARE

## 2023-06-29 VITALS
HEIGHT: 70 IN | TEMPERATURE: 97.4 F | OXYGEN SATURATION: 94 % | HEART RATE: 61 BPM | BODY MASS INDEX: 28.53 KG/M2 | WEIGHT: 199.3 LBS | DIASTOLIC BLOOD PRESSURE: 76 MMHG | SYSTOLIC BLOOD PRESSURE: 128 MMHG

## 2023-06-29 DIAGNOSIS — G89.29 CHRONIC BILATERAL LOW BACK PAIN WITH BILATERAL SCIATICA: ICD-10-CM

## 2023-06-29 DIAGNOSIS — M54.42 CHRONIC BILATERAL LOW BACK PAIN WITH BILATERAL SCIATICA: ICD-10-CM

## 2023-06-29 DIAGNOSIS — G89.29 OTHER CHRONIC PAIN: ICD-10-CM

## 2023-06-29 DIAGNOSIS — M54.41 CHRONIC BILATERAL LOW BACK PAIN WITH BILATERAL SCIATICA: ICD-10-CM

## 2023-06-29 PROCEDURE — 3074F SYST BP LT 130 MM HG: CPT | Performed by: STUDENT IN AN ORGANIZED HEALTH CARE EDUCATION/TRAINING PROGRAM

## 2023-06-29 PROCEDURE — 3078F DIAST BP <80 MM HG: CPT | Performed by: STUDENT IN AN ORGANIZED HEALTH CARE EDUCATION/TRAINING PROGRAM

## 2023-06-29 PROCEDURE — 1126F AMNT PAIN NOTED NONE PRSNT: CPT | Performed by: STUDENT IN AN ORGANIZED HEALTH CARE EDUCATION/TRAINING PROGRAM

## 2023-06-29 PROCEDURE — 99204 OFFICE O/P NEW MOD 45 MIN: CPT | Performed by: STUDENT IN AN ORGANIZED HEALTH CARE EDUCATION/TRAINING PROGRAM

## 2023-06-29 ASSESSMENT — PAIN SCALES - GENERAL: PAINLEVEL: NO PAIN

## 2023-06-29 ASSESSMENT — PATIENT HEALTH QUESTIONNAIRE - PHQ9: CLINICAL INTERPRETATION OF PHQ2 SCORE: 0

## 2023-06-29 ASSESSMENT — FIBROSIS 4 INDEX: FIB4 SCORE: 2.57

## 2023-06-29 NOTE — PROGRESS NOTES
New Patient Note    Interventional Pain and Spine  Physiatry (Physical Medicine and Rehabilitation)     Patient Name: Marty Freedman III  : 1950  Date of Service: 2023  PCP: Hero Hernández D.O.  Referring Provider: Hero Hernández D.O.    Chief Complaint:   Chief Complaint   Patient presents with    New Patient     Chronic bilateral low back pain with bilateral sciatica       HPI  HISTORY FROM INITIAL VISIT (2023):  Marty Freedman III is a 73 y.o. male who presents today with low back pain and lateral glutes. Pain radiates to his lateral knees. Pain started about a few years ago with no known inciting event.       Pain right now is 1/10 on the numeric pain scale. His pain at its best-worse level during the course of the day is typically 1-7/10, respectively.  Pain is minimal at rest but significant with walking or prolonged standing or walking upstairs.  He notes bilateral hip pain worse with walking more than a quarter mile. After sitting for more than a few minutes he can feel like he can walk again.  Pain improves with medications and rest . His pain significantly interferes with ADLs. Denies numbness or tingling in his legs. Notes leg weakness.     The patient has done physical therapy for this problem, most recently within the past year. Continues with home exercise program. Notes no relief with this.     His goal is to understand why he is having these symptoms    Patient has tried the following medications with varied success (current meds in bold):   Tylenol as needed- some relief, takes less than daily  ASA as needed- some relief, takes less than daily    Therapeutic modalities and interventional therapies to date include:  -no injections    Medical history includes hypertension, MI, A-fib, hyperlipidemia, CABG, pacemaker placement.    Psychological testing for pain as depression and pain commonly coexist and need to both be treated.     Opioid Risk Score: 0      Interpretation of Opioid Risk  Score   Score 0-3 = Low risk of abuse. Do UDS at least once per year.  Score 4-7 = Moderate risk of abuse. Do UDS 1-4 times per year.  Score 8+ = High risk of abuse. Refer to specialist.    PHQ      1/12/2022     9:30 AM 5/24/2023    10:20 AM 6/29/2023    11:20 AM   Depression Screen (PHQ-2/PHQ-9)   PHQ-2 Total Score 0 0 0       Interpretation of PHQ-9 Total Score   Score Severity   1-4 No Depression   5-9 Mild Depression   10-14 Moderate Depression   15-19 Moderately Severe Depression   20-27 Severe Depression      Medical records review:  I reviewed the note from the referring provider Hero Hernández D.O. including the note dated 5/24/23.    ROS:   Red Flags ROS:   Fever, Chills, Sweats: Denies  Involuntary Weight Loss: Denies  Bladder Incontinence: Denies  Bowel Incontinence: denies  Saddle Anesthesia: Denies    All other systems reviewed and negative.     PMHx:   Past Medical History:   Diagnosis Date    Arthritis     Atrial fibrillation (HCC)     CHF (congestive heart failure) (HCC)     Chronic bronchitis (HCC)     Disorder of thyroid     Hypothyroid    Emphysema of lung (HCC)     GERD (gastroesophageal reflux disease)     Hemorrhagic disorder (HCC)     On Blood thinners    Hyperlipidemia     Myocardial infarct (HCC) 87, ?89, 2007    x 3    Other and unspecified angina pectoris 10/2016    Defibulator    Pacemaker 10/20/2016    Pacemaker/Defib       PSHx:   Past Surgical History:   Procedure Laterality Date    COLONOSCOPY  10/18/2018    Procedure: COLONOSCOPY - W/POSS BX, DILATION, POLYPECTOMY, CONTROL OF HEMORRHAGE;  Surgeon: Pineda Horvath M.D.;  Location: SURGERY [facility];  Service: EUS    PACEMAKER INSERTION  10/20/2016    INGUINAL HERNIA REPAIR BILATERAL  03/02/2015    Performed by Jack Tobias M.D. at SURGERY SAME DAY [facility]    COLONOSCOPY  2008    MULTIPLE CORONARY ARTERY BYPASS  1991    stents (X 3 placement= stents total)    APPENDECTOMY      APPENDECTOMY CHILD      OTHER ORTHOPEDIC  "SURGERY      knee arthoscopy, bilaterally (\"early 80's\")    TONSILLECTOMY  as a child       Family Hx:   Family History   Problem Relation Age of Onset    Heart Attack Mother     Heart Disease Mother     Hypertension Mother     Cancer Father     Heart Attack Father         s/p CABG    Heart Disease Father         AAA    Hypertension Father     Heart Attack Paternal Grandmother     Heart Attack Paternal Grandfather        Social Hx:  Social History     Socioeconomic History    Marital status:      Spouse name: Not on file    Number of children: Not on file    Years of education: Not on file    Highest education level: 12th grade   Occupational History    Not on file   Tobacco Use    Smoking status: Some Days     Packs/day: 1.00     Years: 45.00     Pack years: 45.00     Types: Cigarettes    Smokeless tobacco: Never    Tobacco comments:     Working on quitting    Vaping Use    Vaping Use: Never used   Substance and Sexual Activity    Alcohol use: Not Currently     Alcohol/week: 0.6 oz     Types: 1 Cans of beer per week     Comment: monthly    Drug use: No    Sexual activity: Not Currently   Other Topics Concern    Not on file   Social History Narrative    Not on file     Social Determinants of Health     Financial Resource Strain: Low Risk  (5/23/2023)    Overall Financial Resource Strain (CARDIA)     Difficulty of Paying Living Expenses: Not hard at all   Food Insecurity: Unknown (5/23/2023)    Hunger Vital Sign     Worried About Running Out of Food in the Last Year: Patient refused     Ran Out of Food in the Last Year: Patient refused   Transportation Needs: Unknown (5/23/2023)    PRAPARE - Transportation     Lack of Transportation (Medical): Patient refused     Lack of Transportation (Non-Medical): Patient refused   Physical Activity: Insufficiently Active (5/23/2023)    Exercise Vital Sign     Days of Exercise per Week: 3 days     Minutes of Exercise per Session: 30 min   Stress: No Stress Concern Present " (5/23/2023)    [employer] [place] [employer] - Occupational Stress Questionnaire     Feeling of Stress : Not at all   Social Connections: Socially Isolated (5/23/2023)    Social Connection and Isolation Panel [NHANES]     Frequency of Communication with Friends and Family: Once a week     Frequency of Social Gatherings with Friends and Family: Once a week     Attends Sikh Services: Never     Active Member of Clubs or Organizations: No     Attends Club or Organization Meetings: Never     Marital Status:    Intimate Partner Violence: Not on file   Housing Stability: Unknown (5/23/2023)    Housing Stability Vital Sign     Unable to Pay for Housing in the Last Year: Patient refused     Number of Places Lived in the Last Year: Not on file     Unstable Housing in the Last Year: Patient refused       Allergies:  No Known Allergies    Medications: reviewed on epic.   Outpatient Medications Marked as Taking for the 6/29/23 encounter (Office Visit) with Angelica Hong M.D.   Medication Sig Dispense Refill    spironolactone (ALDACTONE) 25 MG Tab       magnesium oxide (MAG-OX) 400 MG Tab tablet Take 1 Tablet by mouth every day.      furosemide (LASIX) 20 MG Tab TAKE 1 TABLET BY MOUTH ONCE DAILY AS NEEDED FOR ANKLE SWELLING      digoxin (LANOXIN) 125 MCG Tab Take 1 Tablet by mouth every day.      metoprolol SR (TOPROL XL) 50 MG TABLET SR 24 HR Take 1 Tablet by mouth 2 times a day.      albuterol 108 (90 Base) MCG/ACT Aero Soln inhalation aerosol Inhale 2 Puffs every 6 hours as needed for Shortness of Breath. 1 Each 1    rosuvastatin (CRESTOR) 40 MG tablet Take 40 mg by mouth every day.      levothyroxine (SYNTHROID) 88 MCG Tab Take 1 Tablet by mouth every morning on an empty stomach. 90 Tablet 3    ammonium lactate (LAC-HYDRIN) 12 % Lotion Apply 1 Application topically 3 times a day as needed (dry skin). 225 g 1    nitroglycerin (NITROSTAT) 0.4 MG SL Tab Place 0.4 mg under the tongue as needed.       "lisinopril (PRINIVIL) 5 MG Tab Take 5 mg by mouth every day.      apixaban (ELIQUIS) 5mg Tab Take 5 mg by mouth 2 Times a Day.      aspirin EC (ECOTRIN) 81 MG Tablet Delayed Response Take 81 mg by mouth every day.          Current Outpatient Medications on File Prior to Visit   Medication Sig Dispense Refill    spironolactone (ALDACTONE) 25 MG Tab       magnesium oxide (MAG-OX) 400 MG Tab tablet Take 1 Tablet by mouth every day.      furosemide (LASIX) 20 MG Tab TAKE 1 TABLET BY MOUTH ONCE DAILY AS NEEDED FOR ANKLE SWELLING      digoxin (LANOXIN) 125 MCG Tab Take 1 Tablet by mouth every day.      metoprolol SR (TOPROL XL) 50 MG TABLET SR 24 HR Take 1 Tablet by mouth 2 times a day.      albuterol 108 (90 Base) MCG/ACT Aero Soln inhalation aerosol Inhale 2 Puffs every 6 hours as needed for Shortness of Breath. 1 Each 1    rosuvastatin (CRESTOR) 40 MG tablet Take 40 mg by mouth every day.      levothyroxine (SYNTHROID) 88 MCG Tab Take 1 Tablet by mouth every morning on an empty stomach. 90 Tablet 3    ammonium lactate (LAC-HYDRIN) 12 % Lotion Apply 1 Application topically 3 times a day as needed (dry skin). 225 g 1    nitroglycerin (NITROSTAT) 0.4 MG SL Tab Place 0.4 mg under the tongue as needed.      lisinopril (PRINIVIL) 5 MG Tab Take 5 mg by mouth every day.      apixaban (ELIQUIS) 5mg Tab Take 5 mg by mouth 2 Times a Day.      aspirin EC (ECOTRIN) 81 MG Tablet Delayed Response Take 81 mg by mouth every day.       No current facility-administered medications on file prior to visit.         EXAMINATION     Physical Exam:   /76 (BP Location: Right arm, Patient Position: Sitting, BP Cuff Size: Adult)   Pulse 61   Temp 36.3 °C (97.4 °F) (Temporal)   Ht 1.778 m (5' 10\")   Wt 90.4 kg (199 lb 4.7 oz)   SpO2 94%     Constitutional:   Body Habitus: Body mass index is 28.6 kg/m².  Cooperation: Fully cooperates with exam  Appearance: Well-groomed, well-nourished.    Eyes: No scleral icterus to suggest severe liver " disease, no proptosis to suggest severe hyperthyroidism    ENT -no obvious auditory deficits, no noticeable facial droop     Skin -no rashes or lesions noted     Respiratory-  breathing comfortably on room air, no audible wheezing    Cardiovascular-distal extremities warm and well perfused.  No lower extremity edema is noted.     Gastrointestinal - no obvious abdominal masses, non-distended    Psychiatric- alert and oriented ×3. Normal affect.     Gait - normal gait, no use of ambulatory device, nonantalgic. Heel walking and toe walking intact.    Musculoskeletal and Neuro -       Thoracic/Lumbar Spine/Sacral Spine/Hips   Inspection: No evidence of atrophy in bilateral lower extremities throughout     There is limited active range of motion with lumbar extension    Facet loading maneuver negative bilaterally    Palpation:   Mild tenderness to palpation over the lumbar facets bilaterally spanning approximately L1-L5 levels.     Lumbar spine /hip provocative exam maneuvers  Straight leg raise negative bilaterally  FADIR test negative bilaterally    SI joint tests  NOE test negative bilaterally  Thigh thrust test negative bilaterally      Key points for the international standards for neurological classification of spinal cord injury (ISNCSCI) to light touch.   Dermatome R L   L2 2 2   L3 2 2   L4 2 2   L5 2 2   S1 2 2   S2 2 2       Motor Exam Lower Extremities  ? Myotome R L   Hip flexion L2 5 5   Knee extension L3 5 5   Ankle dorsiflexion L4 5 5   Toe extension L5 5 5   Ankle plantarflexion S1 5 5   Hip abduction 4-/5 bilaterally    Reflexes  ?  R L   Patella  2+ 2+   Achilles   2+ 2+     Clonus of the ankle negative bilaterally       MEDICAL DECISION MAKING    Medical records review: see under HPI section.     DATA    Labs: Personally reviewed at today's visit:     Lab Results   Component Value Date/Time    SODIUM 138 07/27/2022 07:35 AM    SODIUM 137 11/17/2021 10:28 AM    POTASSIUM 4.3 07/27/2022 07:35 AM     POTASSIUM 4.5 11/17/2021 10:28 AM    CHLORIDE 107 07/27/2022 07:35 AM    CHLORIDE 102 11/17/2021 10:28 AM    CO2 26 07/27/2022 07:35 AM    CO2 23 11/17/2021 10:28 AM    ANION 5 07/27/2022 07:35 AM    ANION 12.0 11/17/2021 10:28 AM    GLUCOSE 107 (H) 07/27/2022 07:35 AM    GLUCOSE 91 11/17/2021 10:28 AM    BUN 17 07/27/2022 07:35 AM    BUN 16 11/17/2021 10:28 AM    CREATININE 0.98 07/27/2022 07:35 AM    CREATININE 0.93 11/17/2021 10:28 AM    CALCIUM 9.0 07/27/2022 07:35 AM    CALCIUM 9.1 11/17/2021 10:28 AM    ASTSGOT 14 (L) 07/27/2022 07:35 AM    ASTSGOT 13 11/17/2021 10:28 AM    ALTSGPT 7 (L) 07/27/2022 07:35 AM    ALTSGPT 6 11/17/2021 10:28 AM    TBILIRUBIN 0.8 07/27/2022 07:35 AM    TBILIRUBIN 0.5 11/17/2021 10:28 AM    ALBUMIN 3.9 07/27/2022 07:35 AM    ALBUMIN 4.3 11/17/2021 10:28 AM    TOTPROTEIN 6.2 (L) 07/27/2022 07:35 AM    TOTPROTEIN 6.6 11/17/2021 10:28 AM    GLOBULIN 2.3 (L) 07/27/2022 07:35 AM    GLOBULIN 2.3 11/17/2021 10:28 AM    AGRATIO 1.7 07/27/2022 07:35 AM    AGRATIO 1.9 11/17/2021 10:28 AM       Lab Results   Component Value Date/Time    PROTHROMBTM 15.2 (H) 11/17/2021 10:28 AM    INR 1.24 (H) 11/17/2021 10:28 AM        Lab Results   Component Value Date/Time    WBC 5.5 11/17/2021 10:28 AM    RBC 4.10 (L) 11/17/2021 10:28 AM    HEMOGLOBIN 15.0 11/17/2021 10:28 AM    HEMATOCRIT 44.3 11/17/2021 10:28 AM    .0 (H) 11/17/2021 10:28 AM    MCH 36.6 (H) 11/17/2021 10:28 AM    MCHC 33.9 11/17/2021 10:28 AM    MPV 12.0 11/17/2021 10:28 AM    NEUTSPOLYS 61.70 11/17/2021 10:28 AM    LYMPHOCYTES 24.80 11/17/2021 10:28 AM    MONOCYTES 11.20 11/17/2021 10:28 AM    EOSINOPHILS 1.40 11/17/2021 10:28 AM    BASOPHILS 0.50 11/17/2021 10:28 AM        Lab Results   Component Value Date/Time    HBA1C 6.4 (H) 11/11/2019 08:15 AM        Imaging:   I personally reviewed following images, these are my reads  X-ray hips bilateral 11/30/2022  Mild hip joint OA.  Evidence of facet arthropathy at visualized aspect of  her lumbar spine.    IMAGING radiology reads. I reviewed the following radiology reads   X-ray hips bilateral 2022     AP pelvis and lateral views of both hips were taken reviewed on 2022.  Minimal degenerative change in both hips.  No fractures or   tumors or any other concerning findings.                          Diagnosis  Visit Diagnoses     ICD-10-CM   1. Chronic bilateral low back pain with bilateral sciatica  M54.42    M54.41    G89.29   2. Other chronic pain  G89.29         ASSESSMENT AND PLAN:  Marty Freedman III ( 1950) is a male     Possible symptoms of lumbar spinal stenosis with neurogenic claudication    Marty was seen today for new patient.    Diagnoses and all orders for this visit:    Chronic bilateral low back pain with bilateral sciatica  -     CT-LSPINE W/O PLUS RECONS; Future    Other chronic pain          PLAN  Physical Therapy: The patient has done extensive physical therapy for this problem in the past.  Advised patient to continue exercise program as able.  On exam today the pt exhibited weakness of the hip abductors.  Discussed that this is an important stabilizer of the lumbar spine and hips, and strengthening this muscle with physical therapy should improve pain.  Discussed that we could consider another referral to physical therapy pending results of his CT    Diagnostic workup: as above  - unable to get MRI due to presence of pacemaker  - Personally reviewed at today's visit:  X-ray hips bilateral 2022    Medications:   -Okay to continue OTC Tylenol and ASA as needed    Interventions:   Pending results of CT    Follow-up: After CT complete    Orders Placed This Encounter    CT-LSPINE W/O PLUS RECONS       Angelica Hong MD  Interventional Pain and Spine  Physical Medicine and Rehabilitation  [facility]    Hero Kong, D.[email or web address].     The above note documents my personal evaluation of this patient. In addition, I have reviewed and confirmed with the  patient and MA the supportive information documented in today's Patient Health Questionnaire and Office Note.     Please note that this dictation was created using voice recognition software. I have made every reasonable attempt to correct obvious errors, but I expect that there are errors of grammar and possibly content that I did not discover before finalizing the note.

## 2023-07-09 ENCOUNTER — HOSPITAL ENCOUNTER (OUTPATIENT)
Dept: RADIOLOGY | Facility: MEDICAL CENTER | Age: 73
End: 2023-07-09
Attending: STUDENT IN AN ORGANIZED HEALTH CARE EDUCATION/TRAINING PROGRAM
Payer: MEDICARE

## 2023-07-09 DIAGNOSIS — G89.29 CHRONIC BILATERAL LOW BACK PAIN WITH BILATERAL SCIATICA: ICD-10-CM

## 2023-07-09 DIAGNOSIS — F17.210 CIGARETTE NICOTINE DEPENDENCE WITHOUT COMPLICATION: ICD-10-CM

## 2023-07-09 DIAGNOSIS — M54.42 CHRONIC BILATERAL LOW BACK PAIN WITH BILATERAL SCIATICA: ICD-10-CM

## 2023-07-09 DIAGNOSIS — M54.41 CHRONIC BILATERAL LOW BACK PAIN WITH BILATERAL SCIATICA: ICD-10-CM

## 2023-07-09 DIAGNOSIS — R91.8 LUNG NODULES: ICD-10-CM

## 2023-07-09 PROCEDURE — 72131 CT LUMBAR SPINE W/O DYE: CPT

## 2023-07-09 PROCEDURE — 71271 CT THORAX LUNG CANCER SCR C-: CPT

## 2023-07-26 ENCOUNTER — OFFICE VISIT (OUTPATIENT)
Dept: PHYSICAL MEDICINE AND REHAB | Facility: MEDICAL CENTER | Age: 73
End: 2023-07-26
Payer: MEDICARE

## 2023-07-26 VITALS
TEMPERATURE: 97.1 F | HEART RATE: 60 BPM | HEIGHT: 70 IN | BODY MASS INDEX: 27.93 KG/M2 | DIASTOLIC BLOOD PRESSURE: 60 MMHG | OXYGEN SATURATION: 96 % | WEIGHT: 195.11 LBS | SYSTOLIC BLOOD PRESSURE: 112 MMHG

## 2023-07-26 DIAGNOSIS — M70.61 GREATER TROCHANTERIC BURSITIS OF BOTH HIPS: Primary | ICD-10-CM

## 2023-07-26 DIAGNOSIS — M47.816 LUMBAR SPONDYLOSIS: ICD-10-CM

## 2023-07-26 DIAGNOSIS — M54.42 CHRONIC BILATERAL LOW BACK PAIN WITH BILATERAL SCIATICA: ICD-10-CM

## 2023-07-26 DIAGNOSIS — M54.41 CHRONIC BILATERAL LOW BACK PAIN WITH BILATERAL SCIATICA: ICD-10-CM

## 2023-07-26 DIAGNOSIS — G89.29 CHRONIC BILATERAL LOW BACK PAIN WITH BILATERAL SCIATICA: ICD-10-CM

## 2023-07-26 DIAGNOSIS — M48.061 LUMBAR STENOSIS WITHOUT NEUROGENIC CLAUDICATION: ICD-10-CM

## 2023-07-26 DIAGNOSIS — M70.62 GREATER TROCHANTERIC BURSITIS OF BOTH HIPS: Primary | ICD-10-CM

## 2023-07-26 DIAGNOSIS — G89.29 OTHER CHRONIC PAIN: ICD-10-CM

## 2023-07-26 DIAGNOSIS — M67.959 TENDINOPATHY OF GLUTEUS MEDIUS: ICD-10-CM

## 2023-07-26 PROCEDURE — 3078F DIAST BP <80 MM HG: CPT | Performed by: STUDENT IN AN ORGANIZED HEALTH CARE EDUCATION/TRAINING PROGRAM

## 2023-07-26 PROCEDURE — 3074F SYST BP LT 130 MM HG: CPT | Performed by: STUDENT IN AN ORGANIZED HEALTH CARE EDUCATION/TRAINING PROGRAM

## 2023-07-26 PROCEDURE — 1126F AMNT PAIN NOTED NONE PRSNT: CPT | Performed by: STUDENT IN AN ORGANIZED HEALTH CARE EDUCATION/TRAINING PROGRAM

## 2023-07-26 PROCEDURE — 99214 OFFICE O/P EST MOD 30 MIN: CPT | Performed by: STUDENT IN AN ORGANIZED HEALTH CARE EDUCATION/TRAINING PROGRAM

## 2023-07-26 ASSESSMENT — FIBROSIS 4 INDEX: FIB4 SCORE: 2.57

## 2023-07-26 ASSESSMENT — PAIN SCALES - GENERAL: PAINLEVEL: NO PAIN

## 2023-07-26 ASSESSMENT — PATIENT HEALTH QUESTIONNAIRE - PHQ9: CLINICAL INTERPRETATION OF PHQ2 SCORE: 0

## 2023-07-26 NOTE — PROGRESS NOTES
Follow-up patient Note    Interventional Pain and Spine  Physiatry (Physical Medicine and Rehabilitation)     Patient Name: Marty Freedman III  : 1950  Date of service: 2023    Chief Complaint:   Chief Complaint   Patient presents with    Follow-Up     Chronic bilateral low back pain with bilateral sciatica       HISTORY FROM INITIAL VISIT (2023):  Marty Freedman III is a 73 y.o. male who presents today with low back pain and lateral glutes. Pain radiates to his lateral knees. Pain started about a few years ago with no known inciting event.         Pain right now is 1/10 on the numeric pain scale. His pain at its best-worse level during the course of the day is typically 1-7/10, respectively.  Pain is minimal at rest but significant with walking or prolonged standing or walking upstairs.  He notes bilateral hip pain worse with walking more than a quarter mile. After sitting for more than a few minutes he can feel like he can walk again.  Pain improves with medications and rest . His pain significantly interferes with ADLs. Denies numbness or tingling in his legs. Notes leg weakness.      The patient has done physical therapy for this problem, most recently within the past year. Continues with home exercise program. Notes no relief with this.      His goal is to understand why he is having these symptoms     Patient has tried the following medications with varied success (current meds in bold):   Tylenol as needed- some relief, takes less than daily  ASA as needed- some relief, takes less than daily     Therapeutic modalities and interventional therapies to date include:  -no injections     Medical history includes hypertension, MI, A-fib, hyperlipidemia, CABG, pacemaker placement.    HPI  Today's visit   Marty Freedman III ( 1950) is a male with The primary encounter diagnosis was Greater trochanteric bursitis of both hips. Diagnoses of Chronic bilateral low back pain with bilateral sciatica, Other  chronic pain, Tendinopathy of gluteus medius, Lumbar stenosis without neurogenic claudication, and Lumbar spondylosis were also pertinent to this visit.    Ongoing low back pain that limits his ability to walk more than 10 mins. He needs to for 2-4 mins before he can resume walking without significant pain.    Ongoing pain at bilateral hips worse on his left and with lying in bed.  This is his worst area of pain.  This pain radiates down his lateral thighs.  Feels predominantly achy in nature.  He denies obvious component of pain radiating from his low back down his lateral legs.  He denies numbness, tingling, or weakness in his legs.    Pain severity 4/10 currently  Pt denies new numbness, tingling, or weakness.      ROS:   Red Flags ROS:   Fever, Chills, Sweats: Denies  Involuntary Weight Loss: Denies  Bladder Incontinence: Denies  Bowel Incontinence: denies  Saddle Anesthesia: Denies    All other systems reviewed and negative.     PMHx:   Past Medical History:   Diagnosis Date    Arthritis     Atrial fibrillation (HCC)     CHF (congestive heart failure) (HCC)     Chronic bronchitis (HCC)     Disorder of thyroid     Hypothyroid    Emphysema of lung (HCC)     GERD (gastroesophageal reflux disease)     Hemorrhagic disorder (HCC)     On Blood thinners    Hyperlipidemia     Myocardial infarct (HCC) 87, ?89, 2007    x 3    Other and unspecified angina pectoris 10/2016    Defibulator    Pacemaker 10/20/2016    Pacemaker/Defib       PSHx:   Past Surgical History:   Procedure Laterality Date    COLONOSCOPY  10/18/2018    Procedure: COLONOSCOPY - W/POSS BX, DILATION, POLYPECTOMY, CONTROL OF HEMORRHAGE;  Surgeon: Pineda Horvath M.D.;  Location: SURGERY [facility];  Service: EUS    PACEMAKER INSERTION  10/20/2016    INGUINAL HERNIA REPAIR BILATERAL  03/02/2015    Performed by Jack Tobias M.D. at SURGERY SAME DAY [facility]    COLONOSCOPY  2008    MULTIPLE CORONARY ARTERY BYPASS  1991    stents (X 3 placement=  "stents total)    APPENDECTOMY      APPENDECTOMY CHILD      OTHER ORTHOPEDIC SURGERY      knee arthoscopy, bilaterally (\"early 80's\")    TONSILLECTOMY  as a child       Family Hx:   Family History   Problem Relation Age of Onset    Heart Attack Mother     Heart Disease Mother     Hypertension Mother     Cancer Father     Heart Attack Father         s/p CABG    Heart Disease Father         AAA    Hypertension Father     Heart Attack Paternal Grandmother     Heart Attack Paternal Grandfather        Social Hx:  Social History     Socioeconomic History    Marital status:      Spouse name: Not on file    Number of children: Not on file    Years of education: Not on file    Highest education level: 12th grade   Occupational History    Not on file   Tobacco Use    Smoking status: Every Day     Packs/day: 1.00     Years: 45.00     Pack years: 45.00     Types: Cigarettes    Smokeless tobacco: Never    Tobacco comments:     Working on quitting (7/26/23)   Vaping Use    Vaping Use: Never used   Substance and Sexual Activity    Alcohol use: Not Currently     Alcohol/week: 0.6 oz     Types: 1 Cans of beer per week     Comment: monthly    Drug use: No    Sexual activity: Not Currently   Other Topics Concern    Not on file   Social History Narrative    Not on file     Social Determinants of Health     Financial Resource Strain: Low Risk  (5/23/2023)    Overall Financial Resource Strain (CARDIA)     Difficulty of Paying Living Expenses: Not hard at all   Food Insecurity: Unknown (5/23/2023)    Hunger Vital Sign     Worried About Running Out of Food in the Last Year: Patient refused     Ran Out of Food in the Last Year: Patient refused   Transportation Needs: Unknown (5/23/2023)    PRAPARE - Transportation     Lack of Transportation (Medical): Patient refused     Lack of Transportation (Non-Medical): Patient refused   Physical Activity: Insufficiently Active (5/23/2023)    Exercise Vital Sign     Days of Exercise per Week: 3 " days     Minutes of Exercise per Session: 30 min   Stress: No Stress Concern Present (5/23/2023)    [employer] - Occupational Stress Questionnaire     Feeling of Stress : Not at all   Social Connections: Socially Isolated (5/23/2023)    Social Connection and Isolation Panel [NHANES]     Frequency of Communication with Friends and Family: Once a week     Frequency of Social Gatherings with Friends and Family: Once a week     Attends [employer] Services: Never     Active Member of Clubs or Organizations: No     Attends Club or Organization Meetings: Never     Marital Status:    Intimate Partner Violence: Not on file   Housing Stability: Unknown (5/23/2023)    Housing Stability Vital Sign     Unable to Pay for Housing in the Last Year: Patient refused     Number of Places Lived in the Last Year: Not on file     Unstable Housing in the Last Year: Patient refused       Allergies:  No Known Allergies    Medications: reviewed on epic.   Outpatient Medications Marked as Taking for the 7/26/23 encounter (Office Visit) with Angelcia Hong M.D.   Medication Sig Dispense Refill    spironolactone (ALDACTONE) 25 MG Tab       magnesium oxide (MAG-OX) 400 MG Tab tablet Take 1 Tablet by mouth every day.      furosemide (LASIX) 20 MG Tab TAKE 1 TABLET BY MOUTH ONCE DAILY AS NEEDED FOR ANKLE SWELLING      digoxin (LANOXIN) 125 MCG Tab Take 1 Tablet by mouth every day.      metoprolol SR (TOPROL XL) 50 MG TABLET SR 24 HR Take 1 Tablet by mouth 2 times a day.      albuterol 108 (90 Base) MCG/ACT Aero Soln inhalation aerosol Inhale 2 Puffs every 6 hours as needed for Shortness of Breath. 1 Each 1    rosuvastatin (CRESTOR) 40 MG tablet Take 40 mg by mouth every day.      levothyroxine (SYNTHROID) 88 MCG Tab Take 1 Tablet by mouth every morning on an empty stomach. 90 Tablet 3    ammonium lactate (LAC-HYDRIN) 12 % Lotion Apply 1 Application topically 3 times a day as needed (dry skin). 225 g 1     "nitroglycerin (NITROSTAT) 0.4 MG SL Tab Place 0.4 mg under the tongue as needed.      lisinopril (PRINIVIL) 5 MG Tab Take 5 mg by mouth every day.      apixaban (ELIQUIS) 5mg Tab Take 5 mg by mouth 2 Times a Day.      aspirin EC (ECOTRIN) 81 MG Tablet Delayed Response Take 81 mg by mouth every day.          Current Outpatient Medications on File Prior to Visit   Medication Sig Dispense Refill    spironolactone (ALDACTONE) 25 MG Tab       magnesium oxide (MAG-OX) 400 MG Tab tablet Take 1 Tablet by mouth every day.      furosemide (LASIX) 20 MG Tab TAKE 1 TABLET BY MOUTH ONCE DAILY AS NEEDED FOR ANKLE SWELLING      digoxin (LANOXIN) 125 MCG Tab Take 1 Tablet by mouth every day.      metoprolol SR (TOPROL XL) 50 MG TABLET SR 24 HR Take 1 Tablet by mouth 2 times a day.      albuterol 108 (90 Base) MCG/ACT Aero Soln inhalation aerosol Inhale 2 Puffs every 6 hours as needed for Shortness of Breath. 1 Each 1    rosuvastatin (CRESTOR) 40 MG tablet Take 40 mg by mouth every day.      levothyroxine (SYNTHROID) 88 MCG Tab Take 1 Tablet by mouth every morning on an empty stomach. 90 Tablet 3    ammonium lactate (LAC-HYDRIN) 12 % Lotion Apply 1 Application topically 3 times a day as needed (dry skin). 225 g 1    nitroglycerin (NITROSTAT) 0.4 MG SL Tab Place 0.4 mg under the tongue as needed.      lisinopril (PRINIVIL) 5 MG Tab Take 5 mg by mouth every day.      apixaban (ELIQUIS) 5mg Tab Take 5 mg by mouth 2 Times a Day.      aspirin EC (ECOTRIN) 81 MG Tablet Delayed Response Take 81 mg by mouth every day.       No current facility-administered medications on file prior to visit.         EXAMINATION     Physical Exam:   /60 (BP Location: Right arm, Patient Position: Sitting, BP Cuff Size: Adult)   Pulse 60   Temp 36.2 °C (97.1 °F) (Temporal)   Ht 1.778 m (5' 10\")   Wt 88.5 kg (195 lb 1.7 oz)   SpO2 96%     Constitutional:   Body Habitus: Body mass index is 27.99 kg/m².  Cooperation: Fully cooperates with " exam  Appearance: Well-groomed, well-nourished.    Eyes: No scleral icterus to suggest severe liver disease, no proptosis to suggest severe hyperthyroidism    ENT -no obvious auditory deficits, no noticeable facial droop     Skin -no rashes or lesions noted     Respiratory-  breathing comfortably on room air, no audible wheezing    Cardiovascular-distal extremities warm and well perfused.  No lower extremity edema is noted.     Gastrointestinal - no obvious abdominal masses, non-distended    Psychiatric- alert and oriented ×3. Normal affect.     Gait - normal gait, no use of ambulatory device, nonantalgic.    Musculoskeletal and Neuro -         Thoracic/Lumbar Spine/Sacral Spine/Hips   Inspection: No evidence of atrophy in bilateral lower extremities throughout      There is limited active range of motion with lumbar extension     Facet loading maneuver negative bilaterally     Palpation:   Mild tenderness to palpation over the lumbar facets bilaterally spanning approximately L1-L5 levels.  Tenderness to palpation at bilateral greater trochanteric bursa, worst on the left     Lumbar spine /hip provocative exam maneuvers  Straight leg raise negative bilaterally  FADIR test negative bilaterally     SI joint tests  NOE test negative bilaterally  Thigh thrust test negative bilaterally        Key points for the international standards for neurological classification of spinal cord injury (ISNCSCI) to light touch.   Dermatome R L   L2 2 2   L3 2 2   L4 2 2   L5 2 2   S1 2 2   S2 2 2         Motor Exam Lower Extremities  ? Myotome R L   Hip flexion L2 5 5   Knee extension L3 5 5   Ankle dorsiflexion L4 5 5   Toe extension L5 5 5   Ankle plantarflexion S1 5 5   Hip abduction 4-/5 bilaterally  Reproduction of pain near greater trochanter with hip abduction testing     Previous exam  Reflexes  ?   R L   Patella   2+ 2+   Achilles    2+ 2+      Clonus of the ankle negative bilaterally       MEDICAL DECISION MAKING    Medical  records review: see under HPI section.     DATA    Labs: No new labs available for review since last visit.   Lab Results   Component Value Date/Time    SODIUM 138 07/27/2022 07:35 AM    SODIUM 137 11/17/2021 10:28 AM    POTASSIUM 4.3 07/27/2022 07:35 AM    POTASSIUM 4.5 11/17/2021 10:28 AM    CHLORIDE 107 07/27/2022 07:35 AM    CHLORIDE 102 11/17/2021 10:28 AM    CO2 26 07/27/2022 07:35 AM    CO2 23 11/17/2021 10:28 AM    ANION 5 07/27/2022 07:35 AM    ANION 12.0 11/17/2021 10:28 AM    GLUCOSE 107 (H) 07/27/2022 07:35 AM    GLUCOSE 91 11/17/2021 10:28 AM    BUN 17 07/27/2022 07:35 AM    BUN 16 11/17/2021 10:28 AM    CREATININE 0.98 07/27/2022 07:35 AM    CREATININE 0.93 11/17/2021 10:28 AM    CALCIUM 9.0 07/27/2022 07:35 AM    CALCIUM 9.1 11/17/2021 10:28 AM    ASTSGOT 14 (L) 07/27/2022 07:35 AM    ASTSGOT 13 11/17/2021 10:28 AM    ALTSGPT 7 (L) 07/27/2022 07:35 AM    ALTSGPT 6 11/17/2021 10:28 AM    TBILIRUBIN 0.8 07/27/2022 07:35 AM    TBILIRUBIN 0.5 11/17/2021 10:28 AM    ALBUMIN 3.9 07/27/2022 07:35 AM    ALBUMIN 4.3 11/17/2021 10:28 AM    TOTPROTEIN 6.2 (L) 07/27/2022 07:35 AM    TOTPROTEIN 6.6 11/17/2021 10:28 AM    GLOBULIN 2.3 (L) 07/27/2022 07:35 AM    GLOBULIN 2.3 11/17/2021 10:28 AM    AGRATIO 1.7 07/27/2022 07:35 AM    AGRATIO 1.9 11/17/2021 10:28 AM       Lab Results   Component Value Date/Time    PROTHROMBTM 15.2 (H) 11/17/2021 10:28 AM    INR 1.24 (H) 11/17/2021 10:28 AM        Lab Results   Component Value Date/Time    WBC 5.5 11/17/2021 10:28 AM    RBC 4.10 (L) 11/17/2021 10:28 AM    HEMOGLOBIN 15.0 11/17/2021 10:28 AM    HEMATOCRIT 44.3 11/17/2021 10:28 AM    .0 (H) 11/17/2021 10:28 AM    MCH 36.6 (H) 11/17/2021 10:28 AM    MCHC 33.9 11/17/2021 10:28 AM    MPV 12.0 11/17/2021 10:28 AM    NEUTSPOLYS 61.70 11/17/2021 10:28 AM    LYMPHOCYTES 24.80 11/17/2021 10:28 AM    MONOCYTES 11.20 11/17/2021 10:28 AM    EOSINOPHILS 1.40 11/17/2021 10:28 AM    BASOPHILS 0.50 11/17/2021 10:28 AM        Lab  Results   Component Value Date/Time    HBA1C 6.4 (H) 11/11/2019 08:15 AM        Imaging:   I personally reviewed following images, these are my reads  X-ray hips bilateral 11/30/2022  Mild hip joint OA.  Evidence of facet arthropathy at visualized aspect of her lumbar spine.     CT lumbar spine 7/9/23   Moderate stenosis at L3-4.  Mild stenosis L2-3 and L4-5.  Neuroforaminal stenosis most notable at left L2-3 and L3-4.  Facet arthropathy most notable at bilateral lower lumbar levels. See formal radiology report for further details.      IMAGING radiology reads. I reviewed the following radiology reads   CT lumbar spine 7/9/23   FINDINGS:  There is no compression deformity of the vertebral bodies.  No acute fracture is identified.  There is no dislocation.There is degenerative disc disease facet arthropathy and ligamentum flavum hypertrophy. There is moderate stenosis of the spinal canal   at the L3-L4 level. Mild stenosis is noted at L2-L3 and L4-L5. Mild to moderate multilevel neural foraminal narrowing is also noted. Neural foraminal narrowing is most prominent on the left side at L2-L3 and L3-L4. No change in 3 cm infrarenal abdominal   aortic aneurysm. There is calcific atherosclerosis.     IMPRESSION:     1.  NO ACUTE FRACTURE OR DISLOCATION IS PRESENT IN THE LUMBAR SPINE.     2.  Mild to moderate spinal stenosis is noted which is most prominent at the L3-L4 level and caused by degenerative disc disease facet arthropathy and ligamentum flavum hypertrophy.     3.  Multilevel neural foraminal narrowing is also mild to moderate in degree.     4.  No change in 3 cm infrarenal abdominal aortic aneurysm.    X-ray hips bilateral 11/30/2022     AP pelvis and lateral views of both hips were taken reviewed on November 30, 2022.  Minimal degenerative change in both hips.  No fractures or   tumors or any other concerning findings.                                                 Diagnosis  Visit Diagnoses     ICD-10-CM    1. Greater trochanteric bursitis of both hips  M70.61    M70.62   2. Chronic bilateral low back pain with bilateral sciatica  M54.42    M54.41    G89.29   3. Other chronic pain  G89.29   4. Tendinopathy of gluteus medius  M67.959   5. Lumbar stenosis without neurogenic claudication  M48.061   6. Lumbar spondylosis  M47.816         ASSESSMENT AND PLAN:  Marty Freedman III (: 1950) is a male with     At this time it does not appear that he is experiencing symptoms of lumbar spinal stenosis.  It appears his bilateral lateral thigh pain is predominantly related to greater trochanteric bursitis at this time.     Marty was seen today for follow-up.    Diagnoses and all orders for this visit:    Greater trochanteric bursitis of both hips  -     Referral to Pain Clinic    Chronic bilateral low back pain with bilateral sciatica    Other chronic pain    Tendinopathy of gluteus medius    Lumbar stenosis without neurogenic claudication    Lumbar spondylosis          PLAN  Physical Therapy: The patient has done extensive physical therapy for this problem in the past.  Advised patient to continue exercise program as able.  On exam today the pt exhibited weakness of the hip abductors.  Discussed that this is an important stabilizer of the lumbar spine and hips, and strengthening this muscle with physical therapy should improve pain.      Diagnostic workup:   - unable to get MRI due to presence of pacemaker  - Personally reviewed at today's visit:  CT lumbar spine 23      Medications:   -Okay to continue OTC Tylenol and ASA as needed     Interventions:   -Bilateral greater trochanteric bursa injections under ultrasound guidance. The risks, benefits, and alternatives to this procedure were discussed and the patient wishes to proceed with the procedure. Risks include but are not limited to damage to surrounding structures, infection, bleeding, worsening of pain which can be permanent, and weakness which can be permanent.  Benefits include pain relief and improved function. Alternatives include not doing the procedure.      Follow-up: Next available for injections    Orders Placed This Encounter    Referral to Pain Clinic       Angelica Hong MD  Interventional Pain and Spine  Physical Medicine and Rehabilitation  [facility]      The above note documents my personal evaluation of this patient. In addition, I have reviewed and confirmed with the patient and MA the supportive information documented in today's Patient Health Questionnaire and Office Note.     Please note that this dictation was created using voice recognition software. I have made every reasonable attempt to correct obvious errors, but I expect that there are errors of grammar and possibly content that I did not discover before finalizing the note.

## 2023-07-27 ENCOUNTER — OFFICE VISIT (OUTPATIENT)
Dept: PHYSICAL MEDICINE AND REHAB | Facility: MEDICAL CENTER | Age: 73
End: 2023-07-27
Payer: MEDICARE

## 2023-07-27 VITALS
HEIGHT: 70 IN | TEMPERATURE: 97.5 F | HEART RATE: 63 BPM | WEIGHT: 195.11 LBS | SYSTOLIC BLOOD PRESSURE: 102 MMHG | DIASTOLIC BLOOD PRESSURE: 56 MMHG | OXYGEN SATURATION: 97 % | BODY MASS INDEX: 27.93 KG/M2

## 2023-07-27 DIAGNOSIS — M70.62 GREATER TROCHANTERIC BURSITIS OF BOTH HIPS: ICD-10-CM

## 2023-07-27 DIAGNOSIS — M70.61 GREATER TROCHANTERIC BURSITIS OF BOTH HIPS: ICD-10-CM

## 2023-07-27 PROCEDURE — 3078F DIAST BP <80 MM HG: CPT | Performed by: STUDENT IN AN ORGANIZED HEALTH CARE EDUCATION/TRAINING PROGRAM

## 2023-07-27 PROCEDURE — 3074F SYST BP LT 130 MM HG: CPT | Performed by: STUDENT IN AN ORGANIZED HEALTH CARE EDUCATION/TRAINING PROGRAM

## 2023-07-27 PROCEDURE — 20611 DRAIN/INJ JOINT/BURSA W/US: CPT | Mod: 50 | Performed by: STUDENT IN AN ORGANIZED HEALTH CARE EDUCATION/TRAINING PROGRAM

## 2023-07-27 PROCEDURE — 1125F AMNT PAIN NOTED PAIN PRSNT: CPT | Performed by: STUDENT IN AN ORGANIZED HEALTH CARE EDUCATION/TRAINING PROGRAM

## 2023-07-27 RX ORDER — DEXAMETHASONE SODIUM PHOSPHATE 4 MG/ML
4 INJECTION, SOLUTION INTRA-ARTICULAR; INTRALESIONAL; INTRAMUSCULAR; INTRAVENOUS; SOFT TISSUE ONCE
Status: COMPLETED | OUTPATIENT
Start: 2023-07-27 | End: 2023-07-27

## 2023-07-27 RX ADMIN — DEXAMETHASONE SODIUM PHOSPHATE 4 MG: 4 INJECTION, SOLUTION INTRA-ARTICULAR; INTRALESIONAL; INTRAMUSCULAR; INTRAVENOUS; SOFT TISSUE at 10:00

## 2023-07-27 ASSESSMENT — FIBROSIS 4 INDEX: FIB4 SCORE: 2.57

## 2023-07-27 ASSESSMENT — PAIN SCALES - GENERAL: PAINLEVEL: 4=SLIGHT-MODERATE PAIN

## 2023-07-27 ASSESSMENT — PATIENT HEALTH QUESTIONNAIRE - PHQ9: CLINICAL INTERPRETATION OF PHQ2 SCORE: 0

## 2023-07-27 NOTE — PROCEDURES
Patient Name: Marty Freedman III  : 1950  Date of Service: 2023    Physician/s: Angelica Hong MD    Pre-operative Diagnosis: right and left greater trochanteric bursitis    Post-operative Diagnosis: right and left greater trochanteric bursitis    Procedure: right and left greater trochanteric bursa injection ultrasound-guided    Description of procedure:    The risks, benefits, and alternatives of the procedure were reviewed and discussed with the patient.  Written informed consent was freely obtained. A pre-procedural time-out was conducted by the physician verifying patient’s identity, procedure to be performed, procedure site and side, and allergy verification. Appropriate equipment was determined to be in place for the procedure.     No sedation was used for this procedure.     In the office suite the patient was placed in a lateral position with the left side up, and the skin was prepped and draped in the usual sterile fashion. The ultrasound probe was placed over the left greater trochanter and the target for injection was marked.  The skin and subcutaneous tissues were anesthetized with approximately 1 cc of 1% lidocaine.  A 25g 3.5 inch needle was placed into skin and advanced under ultrasound guidance into the greater trochanteric bursa. Following negative aspiration, approx 3mL of 1% lidocaine with 1mL of 4mg/mL dexamethasone was then injected into the bursa, and the needle was subsequently removed intact after being restyleted. The patient's hip was wiped with a 4x4 gauze, the area was cleansed with alcohol prep, and a bandaid was applied. There were no complications noted. The images were saved for permanent storage.     Then the patient was placed in a lateral position with the right side up, and the skin was prepped and draped in the usual sterile fashion. The ultrasound probe was placed over the right greater trochanter and the target for injection was marked. The skin and subcutaneous  tissues were anesthetized with approximately 1 cc of 1% lidocaine. A 25g 3.5 inch needle was placed into skin and advanced under ultrasound guidance into the greater trochanteric bursa. Following negative aspiration, approx 3mL of 1% lidocaine with 1mL of 4mg/mL dexamethasone was then injected into the bursa, and the needle was subsequently removed intact after being restyleted. The patient's hip was wiped with a 4x4 gauze, the area was cleansed with alcohol prep, and a bandaid was applied. There were no complications noted. The images were saved for permanent storage.     Angelica Hong MD  Interventional Pain and Spine  Physical Medicine and Rehabilitation  [facility]

## 2023-08-07 DIAGNOSIS — E03.9 HYPOTHYROIDISM, UNSPECIFIED TYPE: ICD-10-CM

## 2023-08-15 DIAGNOSIS — E03.9 HYPOTHYROIDISM, UNSPECIFIED TYPE: ICD-10-CM

## 2023-08-15 RX ORDER — LEVOTHYROXINE SODIUM 88 UG/1
88 TABLET ORAL
Qty: 100 TABLET | Refills: 1 | Status: ON HOLD | OUTPATIENT
Start: 2023-08-15 | End: 2023-11-21

## 2023-08-22 RX ORDER — LEVOTHYROXINE SODIUM 88 UG/1
88 TABLET ORAL
Qty: 90 TABLET | Refills: 2 | Status: SHIPPED | OUTPATIENT
Start: 2023-08-22 | End: 2024-03-08 | Stop reason: SDUPTHER

## 2023-08-24 ENCOUNTER — APPOINTMENT (OUTPATIENT)
Dept: MEDICAL GROUP | Facility: PHYSICIAN GROUP | Age: 73
End: 2023-08-24
Payer: MEDICARE

## 2023-09-19 DIAGNOSIS — I71.9 AORTIC ANEURYSM WITHOUT RUPTURE, UNSPECIFIED PORTION OF AORTA (HCC): ICD-10-CM

## 2023-09-19 NOTE — PROGRESS NOTES
----- Message from Michael J Bloch, M.D. sent at 12/6/2021  5:55 PM PST -----  Regarding: RE: Ao/ranulfo Moore - arden aortoiliac as done. Put on saige for repeat aortoilaic duplex in 2 years

## 2023-09-19 NOTE — PROGRESS NOTES
Orders placed for vascular surveillance imaging.    [employer] message sent to pt to remind that they are due for their surveillance vascular imaging.         April Lang R.N.   [facility]

## 2023-11-09 ENCOUNTER — OFFICE VISIT (OUTPATIENT)
Dept: PHYSICAL MEDICINE AND REHAB | Facility: MEDICAL CENTER | Age: 73
End: 2023-11-09
Payer: MEDICARE

## 2023-11-09 VITALS
DIASTOLIC BLOOD PRESSURE: 60 MMHG | SYSTOLIC BLOOD PRESSURE: 114 MMHG | HEIGHT: 70 IN | HEART RATE: 60 BPM | WEIGHT: 196.21 LBS | TEMPERATURE: 96.5 F | OXYGEN SATURATION: 97 % | BODY MASS INDEX: 28.09 KG/M2

## 2023-11-09 DIAGNOSIS — M48.061 LUMBAR STENOSIS WITHOUT NEUROGENIC CLAUDICATION: ICD-10-CM

## 2023-11-09 DIAGNOSIS — M54.42 CHRONIC BILATERAL LOW BACK PAIN WITH BILATERAL SCIATICA: ICD-10-CM

## 2023-11-09 DIAGNOSIS — M70.61 GREATER TROCHANTERIC BURSITIS OF BOTH HIPS: ICD-10-CM

## 2023-11-09 DIAGNOSIS — M54.41 CHRONIC BILATERAL LOW BACK PAIN WITH BILATERAL SCIATICA: ICD-10-CM

## 2023-11-09 DIAGNOSIS — M54.16 LUMBAR RADICULOPATHY, CHRONIC: ICD-10-CM

## 2023-11-09 DIAGNOSIS — M70.62 GREATER TROCHANTERIC BURSITIS OF BOTH HIPS: ICD-10-CM

## 2023-11-09 DIAGNOSIS — G89.29 OTHER CHRONIC PAIN: ICD-10-CM

## 2023-11-09 DIAGNOSIS — G89.29 CHRONIC BILATERAL LOW BACK PAIN WITH BILATERAL SCIATICA: ICD-10-CM

## 2023-11-09 DIAGNOSIS — M67.959 TENDINOPATHY OF GLUTEUS MEDIUS: ICD-10-CM

## 2023-11-09 DIAGNOSIS — M47.816 LUMBAR SPONDYLOSIS: ICD-10-CM

## 2023-11-09 PROCEDURE — 99214 OFFICE O/P EST MOD 30 MIN: CPT | Performed by: STUDENT IN AN ORGANIZED HEALTH CARE EDUCATION/TRAINING PROGRAM

## 2023-11-09 PROCEDURE — 3078F DIAST BP <80 MM HG: CPT | Performed by: STUDENT IN AN ORGANIZED HEALTH CARE EDUCATION/TRAINING PROGRAM

## 2023-11-09 PROCEDURE — 1125F AMNT PAIN NOTED PAIN PRSNT: CPT | Performed by: STUDENT IN AN ORGANIZED HEALTH CARE EDUCATION/TRAINING PROGRAM

## 2023-11-09 PROCEDURE — 3074F SYST BP LT 130 MM HG: CPT | Performed by: STUDENT IN AN ORGANIZED HEALTH CARE EDUCATION/TRAINING PROGRAM

## 2023-11-09 ASSESSMENT — PATIENT HEALTH QUESTIONNAIRE - PHQ9: CLINICAL INTERPRETATION OF PHQ2 SCORE: 0

## 2023-11-09 ASSESSMENT — PAIN SCALES - GENERAL: PAINLEVEL: 3=SLIGHT PAIN

## 2023-11-09 ASSESSMENT — FIBROSIS 4 INDEX: FIB4 SCORE: 2.57

## 2023-11-09 NOTE — H&P (VIEW-ONLY)
Follow-up patient Note    Interventional Pain and Spine  Physiatry (Physical Medicine and Rehabilitation)     Patient Name: Marty Freedman III  : 1950  Date of service: 2023    Chief Complaint:   Chief Complaint   Patient presents with    Follow-Up     4m Fv        HISTORY FROM INITIAL VISIT (2023):  Marty Freedman III is a 73 y.o. male who presents today with low back pain and lateral glutes. Pain radiates to his lateral knees. Pain started about a few years ago with no known inciting event.       Pain right now is 1/10 on the numeric pain scale. His pain at its best-worse level during the course of the day is typically 1-7/10, respectively.  Pain is minimal at rest but significant with walking or prolonged standing or walking upstairs.  He notes bilateral hip pain worse with walking more than a quarter mile. After sitting for more than a few minutes he can feel like he can walk again.  Pain improves with medications and rest . His pain significantly interferes with ADLs. Denies numbness or tingling in his legs. Notes leg weakness.      The patient has done physical therapy for this problem, most recently within the past year. Continues with home exercise program. Notes no relief with this.      His goal is to understand why he is having these symptoms     Patient has tried the following medications with varied success (current meds in bold):   Tylenol as needed- some relief, takes less than daily  ASA as needed- some relief, takes less than daily     Therapeutic modalities and interventional therapies to date include:  -no injections for current pain  Has had 2 epidurals in the past - 40 years ago.     Medical history includes hypertension, MI, A-fib, hyperlipidemia, CABG, pacemaker placement.    HPI  Today's visit   Marty Freedman III ( 1950) is a male with Diagnoses of Greater trochanteric bursitis of both hips, Chronic bilateral low back pain with bilateral sciatica, Other chronic pain,  Tendinopathy of gluteus medius, Lumbar stenosis without neurogenic claudication, Lumbar spondylosis, and Lumbar radiculopathy, chronic were pertinent to this visit.    Presents today after 7/27/2023 right and left greater trochanteric bursa injection ultrasound-guided with improvement in left-sided pain but no significant improvement in right-sided pain.    Notes low back pain radiating down to his bilateral posterior lateral legs to his hips.  Feels achy and dull. Sometimes the legs feel heavy and weak. Denies numbness, tingling, or focal weakness. Pain fluctuates and can be random. Walking makes it worse. Ongoing low back pain that limits his ability to walk more than 10 mins. He needs to for 2-4 mins before he can resume walking without significant pain.    Has done PT in the past with no improvement.     Pain severity 3/10 currently  Pt denies new numbness, tingling, or weakness.    Procedure history:  -7/27/2023 right and left greater trochanteric bursa injection ultrasound-guided -  improvement in left-sided pain but no significant improvement in right-sided pain.        ROS:   Red Flags ROS:   Fever, Chills, Sweats: Denies  Involuntary Weight Loss: Denies  Bladder Incontinence: Denies  Bowel Incontinence: denies  Saddle Anesthesia: Denies    All other systems reviewed and negative.     PMHx:   Past Medical History:   Diagnosis Date    Arthritis     Atrial fibrillation (HCC)     CHF (congestive heart failure) (HCC)     Chronic bronchitis (HCC)     Disorder of thyroid     Hypothyroid    Emphysema of lung (HCC)     GERD (gastroesophageal reflux disease)     Hemorrhagic disorder (HCC)     On Blood thinners    Hyperlipidemia     Myocardial infarct (HCC) 87, ?89, 2007    x 3    Other and unspecified angina pectoris 10/2016    Defibulator    Pacemaker 10/20/2016    Pacemaker/Defib       PSHx:   Past Surgical History:   Procedure Laterality Date    COLONOSCOPY  10/18/2018    Procedure: COLONOSCOPY - W/POSS BX,  "DILATION, POLYPECTOMY, CONTROL OF HEMORRHAGE;  Surgeon: Pineda Horvath M.D.;  Location: SURGERY [facility];  Service: EUS    PACEMAKER INSERTION  10/20/2016    INGUINAL HERNIA REPAIR BILATERAL  03/02/2015    Performed by Jack Tobias M.D. at SURGERY SAME DAY [facility] ORS    COLONOSCOPY  2008    MULTIPLE CORONARY ARTERY BYPASS  1991    stents (X 3 placement= stents total)    APPENDECTOMY      APPENDECTOMY CHILD      OTHER ORTHOPEDIC SURGERY      knee arthoscopy, bilaterally (\"early 80's\")    TONSILLECTOMY  as a child       Family Hx:   Family History   Problem Relation Age of Onset    Heart Attack Mother     Heart Disease Mother     Hypertension Mother     Cancer Father     Heart Attack Father         s/p CABG    Heart Disease Father         AAA    Hypertension Father     Heart Attack Paternal Grandmother     Heart Attack Paternal Grandfather        Social Hx:  Social History     Socioeconomic History    Marital status:      Spouse name: Not on file    Number of children: Not on file    Years of education: Not on file    Highest education level: 12th grade   Occupational History    Not on file   Tobacco Use    Smoking status: Every Day     Current packs/day: 1.00     Average packs/day: 1 pack/day for 45.0 years (45.0 ttl pk-yrs)     Types: Cigarettes    Smokeless tobacco: Never    Tobacco comments:     Working on quitting (7/26/23)   Vaping Use    Vaping Use: Never used   Substance and Sexual Activity    Alcohol use: Not Currently     Alcohol/week: 0.6 oz     Types: 1 Cans of beer per week     Comment: monthly    Drug use: No    Sexual activity: Not Currently   Other Topics Concern    Not on file   Social History Narrative    Not on file     Social Determinants of Health     Financial Resource Strain: Low Risk  (5/23/2023)    Overall Financial Resource Strain (CARDIA)     Difficulty of Paying Living Expenses: Not hard at all   Food Insecurity: Unknown (5/23/2023)    Hunger Vital Sign     Worried " About Running Out of Food in the Last Year: Patient refused     Ran Out of Food in the Last Year: Patient refused   Transportation Needs: Unknown (5/23/2023)    PRAPARE - Transportation     Lack of Transportation (Medical): Patient refused     Lack of Transportation (Non-Medical): Patient refused   Physical Activity: Insufficiently Active (5/23/2023)    Exercise Vital Sign     Days of Exercise per Week: 3 days     Minutes of Exercise per Session: 30 min   Stress: No Stress Concern Present (5/23/2023)    [employer] [place] [employer] - Occupational Stress Questionnaire     Feeling of Stress : Not at all   Social Connections: Socially Isolated (5/23/2023)    Social Connection and Isolation Panel [NHANES]     Frequency of Communication with Friends and Family: Once a week     Frequency of Social Gatherings with Friends and Family: Once a week     Attends Faith Services: Never     Active Member of Clubs or Organizations: No     Attends Club or Organization Meetings: Never     Marital Status:    Intimate Partner Violence: Not on file   Housing Stability: Unknown (5/23/2023)    Housing Stability Vital Sign     Unable to Pay for Housing in the Last Year: Patient refused     Number of Places Lived in the Last Year: Not on file     Unstable Housing in the Last Year: Patient refused       Allergies:  No Known Allergies    Medications: reviewed on epic.   Outpatient Medications Marked as Taking for the 11/9/23 encounter (Office Visit) with Angelica Hong M.D.   Medication Sig Dispense Refill    levothyroxine (SYNTHROID) 88 MCG Tab TAKE 1 TABLET BY MOUTH IN THE MORNING ON AN EMPTY STOMACH 90 Tablet 2    levothyroxine (SYNTHROID) 88 MCG Tab Take 1 Tablet by mouth every morning on an empty stomach. 100 Tablet 1    spironolactone (ALDACTONE) 25 MG Tab       magnesium oxide (MAG-OX) 400 MG Tab tablet Take 1 Tablet by mouth every day.      furosemide (LASIX) 20 MG Tab TAKE 1 TABLET BY MOUTH ONCE DAILY AS  NEEDED FOR ANKLE SWELLING      digoxin (LANOXIN) 125 MCG Tab Take 1 Tablet by mouth every day.      metoprolol SR (TOPROL XL) 50 MG TABLET SR 24 HR Take 1 Tablet by mouth 2 times a day.      albuterol 108 (90 Base) MCG/ACT Aero Soln inhalation aerosol Inhale 2 Puffs every 6 hours as needed for Shortness of Breath. 1 Each 1    rosuvastatin (CRESTOR) 40 MG tablet Take 40 mg by mouth every day.      ammonium lactate (LAC-HYDRIN) 12 % Lotion Apply 1 Application topically 3 times a day as needed (dry skin). 225 g 1    nitroglycerin (NITROSTAT) 0.4 MG SL Tab Place 0.4 mg under the tongue as needed.      lisinopril (PRINIVIL) 5 MG Tab Take 5 mg by mouth every day.      apixaban (ELIQUIS) 5mg Tab Take 5 mg by mouth 2 Times a Day.      aspirin EC (ECOTRIN) 81 MG Tablet Delayed Response Take 81 mg by mouth every day.          Current Outpatient Medications on File Prior to Visit   Medication Sig Dispense Refill    levothyroxine (SYNTHROID) 88 MCG Tab TAKE 1 TABLET BY MOUTH IN THE MORNING ON AN EMPTY STOMACH 90 Tablet 2    levothyroxine (SYNTHROID) 88 MCG Tab Take 1 Tablet by mouth every morning on an empty stomach. 100 Tablet 1    spironolactone (ALDACTONE) 25 MG Tab       magnesium oxide (MAG-OX) 400 MG Tab tablet Take 1 Tablet by mouth every day.      furosemide (LASIX) 20 MG Tab TAKE 1 TABLET BY MOUTH ONCE DAILY AS NEEDED FOR ANKLE SWELLING      digoxin (LANOXIN) 125 MCG Tab Take 1 Tablet by mouth every day.      metoprolol SR (TOPROL XL) 50 MG TABLET SR 24 HR Take 1 Tablet by mouth 2 times a day.      albuterol 108 (90 Base) MCG/ACT Aero Soln inhalation aerosol Inhale 2 Puffs every 6 hours as needed for Shortness of Breath. 1 Each 1    rosuvastatin (CRESTOR) 40 MG tablet Take 40 mg by mouth every day.      ammonium lactate (LAC-HYDRIN) 12 % Lotion Apply 1 Application topically 3 times a day as needed (dry skin). 225 g 1    nitroglycerin (NITROSTAT) 0.4 MG SL Tab Place 0.4 mg under the tongue as needed.      lisinopril  "(PRINIVIL) 5 MG Tab Take 5 mg by mouth every day.      apixaban (ELIQUIS) 5mg Tab Take 5 mg by mouth 2 Times a Day.      aspirin EC (ECOTRIN) 81 MG Tablet Delayed Response Take 81 mg by mouth every day.       No current facility-administered medications on file prior to visit.         EXAMINATION     Physical Exam:   /60 (BP Location: Right arm, Patient Position: Sitting, BP Cuff Size: Adult)   Pulse 60   Temp 35.8 °C (96.5 °F) (Temporal)   Ht 1.778 m (5' 10\")   Wt 89 kg (196 lb 3.4 oz)   SpO2 97%     Constitutional:   Body Habitus: Body mass index is 28.15 kg/m².  Cooperation: Fully cooperates with exam  Appearance: Well-groomed, well-nourished.    Eyes: No scleral icterus to suggest severe liver disease, no proptosis to suggest severe hyperthyroidism    ENT -no obvious auditory deficits, no noticeable facial droop     Skin -no rashes or lesions noted     Respiratory-  breathing comfortably on room air, no audible wheezing    Cardiovascular-distal extremities warm and well perfused.  No lower extremity edema is noted.     Gastrointestinal - no obvious abdominal masses, non-distended    Psychiatric- alert and oriented ×3. Normal affect.     Gait - normal gait, no use of ambulatory device, nonantalgic.      Musculoskeletal and Neuro -         Thoracic/Lumbar Spine/Sacral Spine/Hips   Inspection: No evidence of atrophy in bilateral lower extremities throughout      There is limited active range of motion with lumbar extension     Facet loading maneuver negative bilaterally     Palpation:   Mild tenderness to palpation over the lumbar facets bilaterally spanning approximately L1-L5 levels.  Tenderness to palpation at right greater trochanteric bursa, no significant tenderness to palpation at left greater trochanteric bursa      Lumbar spine /hip provocative exam maneuvers  Straight leg raise negative bilaterally  FADIR test negative bilaterally     SI joint tests  NOE test negative bilaterally  Thigh " thrust test negative bilaterally      Key points for the international standards for neurological classification of spinal cord injury (ISNCSCI) to light touch.   Dermatome R L   L2 2 2   L3 2 2   L4 2 2   L5 2 2   S1 2 2   S2 2 2         Motor Exam Lower Extremities  ? Myotome R L   Hip flexion L2 5 5   Knee extension L3 5 5   Ankle dorsiflexion L4 5 5   Toe extension L5 5 5   Ankle plantarflexion S1 5 5        Previous exam  Reflexes  ?   R L   Patella   2+ 2+   Achilles    2+ 2+      Clonus of the ankle negative bilaterally       MEDICAL DECISION MAKING    Medical records review: see under HPI section.     DATA    Labs: No new labs available for review since last visit.   Lab Results   Component Value Date/Time    SODIUM 137 08/17/2023 07:20 AM    SODIUM 137 11/17/2021 10:28 AM    POTASSIUM 4.1 08/17/2023 07:20 AM    POTASSIUM 4.5 11/17/2021 10:28 AM    CHLORIDE 106 08/17/2023 07:20 AM    CHLORIDE 102 11/17/2021 10:28 AM    CO2 25 08/17/2023 07:20 AM    CO2 23 11/17/2021 10:28 AM    ANION 6 08/17/2023 07:20 AM    ANION 12.0 11/17/2021 10:28 AM    GLUCOSE 116 (H) 08/17/2023 07:20 AM    GLUCOSE 91 11/17/2021 10:28 AM    BUN 13 08/17/2023 07:20 AM    BUN 16 11/17/2021 10:28 AM    CREATININE 0.95 08/17/2023 07:20 AM    CREATININE 0.93 11/17/2021 10:28 AM    CALCIUM 9.1 08/17/2023 07:20 AM    CALCIUM 9.1 11/17/2021 10:28 AM    ASTSGOT 14 (L) 07/27/2022 07:35 AM    ASTSGOT 13 11/17/2021 10:28 AM    ALTSGPT 7 (L) 07/27/2022 07:35 AM    ALTSGPT 6 11/17/2021 10:28 AM    TBILIRUBIN 0.8 07/27/2022 07:35 AM    TBILIRUBIN 0.5 11/17/2021 10:28 AM    ALBUMIN 3.9 07/27/2022 07:35 AM    ALBUMIN 4.3 11/17/2021 10:28 AM    TOTPROTEIN 6.2 (L) 07/27/2022 07:35 AM    TOTPROTEIN 6.6 11/17/2021 10:28 AM    GLOBULIN 2.3 (L) 07/27/2022 07:35 AM    GLOBULIN 2.3 11/17/2021 10:28 AM    AGRATIO 1.7 07/27/2022 07:35 AM    AGRATIO 1.9 11/17/2021 10:28 AM       Lab Results   Component Value Date/Time    PROTHROMBTM 15.2 (H) 11/17/2021 10:28 AM     INR 1.24 (H) 11/17/2021 10:28 AM        Lab Results   Component Value Date/Time    WBC 5.5 11/17/2021 10:28 AM    RBC 4.10 (L) 11/17/2021 10:28 AM    HEMOGLOBIN 15.0 11/17/2021 10:28 AM    HEMATOCRIT 44.3 11/17/2021 10:28 AM    .0 (H) 11/17/2021 10:28 AM    MCH 36.6 (H) 11/17/2021 10:28 AM    MCHC 33.9 11/17/2021 10:28 AM    MPV 12.0 11/17/2021 10:28 AM    NEUTSPOLYS 61.70 11/17/2021 10:28 AM    LYMPHOCYTES 24.80 11/17/2021 10:28 AM    MONOCYTES 11.20 11/17/2021 10:28 AM    EOSINOPHILS 1.40 11/17/2021 10:28 AM    BASOPHILS 0.50 11/17/2021 10:28 AM        Lab Results   Component Value Date/Time    HBA1C 6.4 (H) 11/11/2019 08:15 AM        Imaging:   I personally reviewed following images, these are my reads  X-ray hips bilateral 11/30/2022  Mild hip joint OA.  Evidence of facet arthropathy at visualized aspect of her lumbar spine.     CT lumbar spine 7/9/23   Moderate stenosis at L3-4.  Mild stenosis L2-3 and L4-5.  Neuroforaminal stenosis most notable at left L2-3 and L3-4.  Facet arthropathy most notable at bilateral lower lumbar levels. See formal radiology report for further details.      IMAGING radiology reads. I reviewed the following radiology reads   CT lumbar spine 7/9/23   FINDINGS:  There is no compression deformity of the vertebral bodies.  No acute fracture is identified.  There is no dislocation.There is degenerative disc disease facet arthropathy and ligamentum flavum hypertrophy. There is moderate stenosis of the spinal canal   at the L3-L4 level. Mild stenosis is noted at L2-L3 and L4-L5. Mild to moderate multilevel neural foraminal narrowing is also noted. Neural foraminal narrowing is most prominent on the left side at L2-L3 and L3-L4. No change in 3 cm infrarenal abdominal   aortic aneurysm. There is calcific atherosclerosis.     IMPRESSION:     1.  NO ACUTE FRACTURE OR DISLOCATION IS PRESENT IN THE LUMBAR SPINE.     2.  Mild to moderate spinal stenosis is noted which is most prominent  at the L3-L4 level and caused by degenerative disc disease facet arthropathy and ligamentum flavum hypertrophy.     3.  Multilevel neural foraminal narrowing is also mild to moderate in degree.     4.  No change in 3 cm infrarenal abdominal aortic aneurysm.    X-ray hips bilateral 2022     AP pelvis and lateral views of both hips were taken reviewed on 2022.  Minimal degenerative change in both hips.  No fractures or   tumors or any other concerning findings.                                                 Diagnosis  Visit Diagnoses     ICD-10-CM   1. Greater trochanteric bursitis of both hips  M70.61    M70.62   2. Chronic bilateral low back pain with bilateral sciatica  M54.42    M54.41    G89.29   3. Other chronic pain  G89.29   4. Tendinopathy of gluteus medius  M67.959   5. Lumbar stenosis without neurogenic claudication  M48.061   6. Lumbar spondylosis  M47.816   7. Lumbar radiculopathy, chronic  M54.16           ASSESSMENT AND PLAN:  Marty Freedman III (: 1950) is a male      Marty was seen today for follow-up.    Diagnoses and all orders for this visit:    Greater trochanteric bursitis of both hips  -     Referral to Physical Therapy    Chronic bilateral low back pain with bilateral sciatica  -     Referral to Physical Therapy    Other chronic pain    Tendinopathy of gluteus medius  -     Referral to Physical Therapy    Lumbar stenosis without neurogenic claudication  -     Referral to Physical Therapy    Lumbar spondylosis  -     Referral to Physical Therapy    Lumbar radiculopathy, chronic  -     Referral to Pain Clinic  -     Referral to Physical Therapy            PLAN  Physical Therapy: referral to PT today to target low back. The patient has done extensive physical therapy for this problem in the past.  Advised patient to continue exercise program as able.  On exam the pt has exhibited weakness of the hip abductors.  Discussed that this is an important stabilizer of the lumbar  spine and hips, and strengthening this muscle with physical therapy should improve pain.      Diagnostic workup:   - unable to get MRI due to presence of pacemaker  - Personally reviewed at today's visit:  CT lumbar spine 7/9/23      Medications:   -Okay to continue OTC Tylenol and ASA as needed     Interventions:   - bilateral L5-S1 transforaminal epidural steroid injection. The risks, benefits, and alternatives to this procedure were discussed and the patient wishes to proceed with the procedure. Risks include but are not limited to damage to surrounding structures, infection, bleeding, worsening of pain which can be permanent, and weakness which can be permanent. Benefits include pain relief and improved function. Alternatives include not doing the procedure.    - increased risk of bleeding with eliquis. NEED CLEARANCE TO HOLD ELIQUIS prior to procedure for safety reasons  - left greater trochanteric bursa injection PRN  - s/p right greater trochanteric bursa with no significant improvement    Follow-up: 1 month after injection    Orders Placed This Encounter    Referral to Pain Clinic    Referral to Physical Therapy       Angelica Hong MD  Interventional Pain and Spine  Physical Medicine and Rehabilitation  [facility]      The above note documents my personal evaluation of this patient. In addition, I have reviewed and confirmed with the patient and MA the supportive information documented in today's Patient Health Questionnaire and Office Note.     Please note that this dictation was created using voice recognition software. I have made every reasonable attempt to correct obvious errors, but I expect that there are errors of grammar and possibly content that I did not discover before finalizing the note.

## 2023-11-09 NOTE — PROGRESS NOTES
Follow-up patient Note    Interventional Pain and Spine  Physiatry (Physical Medicine and Rehabilitation)     Patient Name: Marty Freedman III  : 1950  Date of service: 2023    Chief Complaint:   Chief Complaint   Patient presents with    Follow-Up     4m Fv        HISTORY FROM INITIAL VISIT (2023):  Marty Freedman III is a 73 y.o. male who presents today with low back pain and lateral glutes. Pain radiates to his lateral knees. Pain started about a few years ago with no known inciting event.       Pain right now is 1/10 on the numeric pain scale. His pain at its best-worse level during the course of the day is typically 1-7/10, respectively.  Pain is minimal at rest but significant with walking or prolonged standing or walking upstairs.  He notes bilateral hip pain worse with walking more than a quarter mile. After sitting for more than a few minutes he can feel like he can walk again.  Pain improves with medications and rest . His pain significantly interferes with ADLs. Denies numbness or tingling in his legs. Notes leg weakness.      The patient has done physical therapy for this problem, most recently within the past year. Continues with home exercise program. Notes no relief with this.      His goal is to understand why he is having these symptoms     Patient has tried the following medications with varied success (current meds in bold):   Tylenol as needed- some relief, takes less than daily  ASA as needed- some relief, takes less than daily     Therapeutic modalities and interventional therapies to date include:  -no injections for current pain  Has had 2 epidurals in the past - 40 years ago.     Medical history includes hypertension, MI, A-fib, hyperlipidemia, CABG, pacemaker placement.    HPI  Today's visit   Marty Freedman III ( 1950) is a male with Diagnoses of Greater trochanteric bursitis of both hips, Chronic bilateral low back pain with bilateral sciatica, Other chronic pain,  Tendinopathy of gluteus medius, Lumbar stenosis without neurogenic claudication, Lumbar spondylosis, and Lumbar radiculopathy, chronic were pertinent to this visit.    Presents today after 7/27/2023 right and left greater trochanteric bursa injection ultrasound-guided with improvement in left-sided pain but no significant improvement in right-sided pain.    Notes low back pain radiating down to his bilateral posterior lateral legs to his hips.  Feels achy and dull. Sometimes the legs feel heavy and weak. Denies numbness, tingling, or focal weakness. Pain fluctuates and can be random. Walking makes it worse. Ongoing low back pain that limits his ability to walk more than 10 mins. He needs to for 2-4 mins before he can resume walking without significant pain.    Has done PT in the past with no improvement.     Pain severity 3/10 currently  Pt denies new numbness, tingling, or weakness.    Procedure history:  -7/27/2023 right and left greater trochanteric bursa injection ultrasound-guided -  improvement in left-sided pain but no significant improvement in right-sided pain.        ROS:   Red Flags ROS:   Fever, Chills, Sweats: Denies  Involuntary Weight Loss: Denies  Bladder Incontinence: Denies  Bowel Incontinence: denies  Saddle Anesthesia: Denies    All other systems reviewed and negative.     PMHx:   Past Medical History:   Diagnosis Date    Arthritis     Atrial fibrillation (HCC)     CHF (congestive heart failure) (HCC)     Chronic bronchitis (HCC)     Disorder of thyroid     Hypothyroid    Emphysema of lung (HCC)     GERD (gastroesophageal reflux disease)     Hemorrhagic disorder (HCC)     On Blood thinners    Hyperlipidemia     Myocardial infarct (HCC) 87, ?89, 2007    x 3    Other and unspecified angina pectoris 10/2016    Defibulator    Pacemaker 10/20/2016    Pacemaker/Defib       PSHx:   Past Surgical History:   Procedure Laterality Date    COLONOSCOPY  10/18/2018    Procedure: COLONOSCOPY - W/POSS BX,  "DILATION, POLYPECTOMY, CONTROL OF HEMORRHAGE;  Surgeon: Pineda Horvath M.D.;  Location: SURGERY [facility];  Service: EUS    PACEMAKER INSERTION  10/20/2016    INGUINAL HERNIA REPAIR BILATERAL  03/02/2015    Performed by Jack Tobias M.D. at SURGERY SAME DAY [facility] ORS    COLONOSCOPY  2008    MULTIPLE CORONARY ARTERY BYPASS  1991    stents (X 3 placement= stents total)    APPENDECTOMY      APPENDECTOMY CHILD      OTHER ORTHOPEDIC SURGERY      knee arthoscopy, bilaterally (\"early 80's\")    TONSILLECTOMY  as a child       Family Hx:   Family History   Problem Relation Age of Onset    Heart Attack Mother     Heart Disease Mother     Hypertension Mother     Cancer Father     Heart Attack Father         s/p CABG    Heart Disease Father         AAA    Hypertension Father     Heart Attack Paternal Grandmother     Heart Attack Paternal Grandfather        Social Hx:  Social History     Socioeconomic History    Marital status:      Spouse name: Not on file    Number of children: Not on file    Years of education: Not on file    Highest education level: 12th grade   Occupational History    Not on file   Tobacco Use    Smoking status: Every Day     Current packs/day: 1.00     Average packs/day: 1 pack/day for 45.0 years (45.0 ttl pk-yrs)     Types: Cigarettes    Smokeless tobacco: Never    Tobacco comments:     Working on quitting (7/26/23)   Vaping Use    Vaping Use: Never used   Substance and Sexual Activity    Alcohol use: Not Currently     Alcohol/week: 0.6 oz     Types: 1 Cans of beer per week     Comment: monthly    Drug use: No    Sexual activity: Not Currently   Other Topics Concern    Not on file   Social History Narrative    Not on file     Social Determinants of Health     Financial Resource Strain: Low Risk  (5/23/2023)    Overall Financial Resource Strain (CARDIA)     Difficulty of Paying Living Expenses: Not hard at all   Food Insecurity: Unknown (5/23/2023)    Hunger Vital Sign     Worried " About Running Out of Food in the Last Year: Patient refused     Ran Out of Food in the Last Year: Patient refused   Transportation Needs: Unknown (5/23/2023)    PRAPARE - Transportation     Lack of Transportation (Medical): Patient refused     Lack of Transportation (Non-Medical): Patient refused   Physical Activity: Insufficiently Active (5/23/2023)    Exercise Vital Sign     Days of Exercise per Week: 3 days     Minutes of Exercise per Session: 30 min   Stress: No Stress Concern Present (5/23/2023)    [employer] - Occupational Stress Questionnaire     Feeling of Stress : Not at all   Social Connections: Socially Isolated (5/23/2023)    Social Connection and Isolation Panel [NHANES]     Frequency of Communication with Friends and Family: Once a week     Frequency of Social Gatherings with Friends and Family: Once a week     Attends [employer] Services: Never     Active Member of Clubs or Organizations: No     Attends Club or Organization Meetings: Never     Marital Status:    Intimate Partner Violence: Not on file   Housing Stability: Unknown (5/23/2023)    Housing Stability Vital Sign     Unable to Pay for Housing in the Last Year: Patient refused     Number of Places Lived in the Last Year: Not on file     Unstable Housing in the Last Year: Patient refused       Allergies:  No Known Allergies    Medications: reviewed on epic.   Outpatient Medications Marked as Taking for the 11/9/23 encounter (Office Visit) with Angelica Hong M.D.   Medication Sig Dispense Refill    levothyroxine (SYNTHROID) 88 MCG Tab TAKE 1 TABLET BY MOUTH IN THE MORNING ON AN EMPTY STOMACH 90 Tablet 2    levothyroxine (SYNTHROID) 88 MCG Tab Take 1 Tablet by mouth every morning on an empty stomach. 100 Tablet 1    spironolactone (ALDACTONE) 25 MG Tab       magnesium oxide (MAG-OX) 400 MG Tab tablet Take 1 Tablet by mouth every day.      furosemide (LASIX) 20 MG Tab TAKE 1 TABLET BY MOUTH ONCE DAILY AS  NEEDED FOR ANKLE SWELLING      digoxin (LANOXIN) 125 MCG Tab Take 1 Tablet by mouth every day.      metoprolol SR (TOPROL XL) 50 MG TABLET SR 24 HR Take 1 Tablet by mouth 2 times a day.      albuterol 108 (90 Base) MCG/ACT Aero Soln inhalation aerosol Inhale 2 Puffs every 6 hours as needed for Shortness of Breath. 1 Each 1    rosuvastatin (CRESTOR) 40 MG tablet Take 40 mg by mouth every day.      ammonium lactate (LAC-HYDRIN) 12 % Lotion Apply 1 Application topically 3 times a day as needed (dry skin). 225 g 1    nitroglycerin (NITROSTAT) 0.4 MG SL Tab Place 0.4 mg under the tongue as needed.      lisinopril (PRINIVIL) 5 MG Tab Take 5 mg by mouth every day.      apixaban (ELIQUIS) 5mg Tab Take 5 mg by mouth 2 Times a Day.      aspirin EC (ECOTRIN) 81 MG Tablet Delayed Response Take 81 mg by mouth every day.          Current Outpatient Medications on File Prior to Visit   Medication Sig Dispense Refill    levothyroxine (SYNTHROID) 88 MCG Tab TAKE 1 TABLET BY MOUTH IN THE MORNING ON AN EMPTY STOMACH 90 Tablet 2    levothyroxine (SYNTHROID) 88 MCG Tab Take 1 Tablet by mouth every morning on an empty stomach. 100 Tablet 1    spironolactone (ALDACTONE) 25 MG Tab       magnesium oxide (MAG-OX) 400 MG Tab tablet Take 1 Tablet by mouth every day.      furosemide (LASIX) 20 MG Tab TAKE 1 TABLET BY MOUTH ONCE DAILY AS NEEDED FOR ANKLE SWELLING      digoxin (LANOXIN) 125 MCG Tab Take 1 Tablet by mouth every day.      metoprolol SR (TOPROL XL) 50 MG TABLET SR 24 HR Take 1 Tablet by mouth 2 times a day.      albuterol 108 (90 Base) MCG/ACT Aero Soln inhalation aerosol Inhale 2 Puffs every 6 hours as needed for Shortness of Breath. 1 Each 1    rosuvastatin (CRESTOR) 40 MG tablet Take 40 mg by mouth every day.      ammonium lactate (LAC-HYDRIN) 12 % Lotion Apply 1 Application topically 3 times a day as needed (dry skin). 225 g 1    nitroglycerin (NITROSTAT) 0.4 MG SL Tab Place 0.4 mg under the tongue as needed.      lisinopril  "(PRINIVIL) 5 MG Tab Take 5 mg by mouth every day.      apixaban (ELIQUIS) 5mg Tab Take 5 mg by mouth 2 Times a Day.      aspirin EC (ECOTRIN) 81 MG Tablet Delayed Response Take 81 mg by mouth every day.       No current facility-administered medications on file prior to visit.         EXAMINATION     Physical Exam:   /60 (BP Location: Right arm, Patient Position: Sitting, BP Cuff Size: Adult)   Pulse 60   Temp 35.8 °C (96.5 °F) (Temporal)   Ht 1.778 m (5' 10\")   Wt 89 kg (196 lb 3.4 oz)   SpO2 97%     Constitutional:   Body Habitus: Body mass index is 28.15 kg/m².  Cooperation: Fully cooperates with exam  Appearance: Well-groomed, well-nourished.    Eyes: No scleral icterus to suggest severe liver disease, no proptosis to suggest severe hyperthyroidism    ENT -no obvious auditory deficits, no noticeable facial droop     Skin -no rashes or lesions noted     Respiratory-  breathing comfortably on room air, no audible wheezing    Cardiovascular-distal extremities warm and well perfused.  No lower extremity edema is noted.     Gastrointestinal - no obvious abdominal masses, non-distended    Psychiatric- alert and oriented ×3. Normal affect.     Gait - normal gait, no use of ambulatory device, nonantalgic.      Musculoskeletal and Neuro -         Thoracic/Lumbar Spine/Sacral Spine/Hips   Inspection: No evidence of atrophy in bilateral lower extremities throughout      There is limited active range of motion with lumbar extension     Facet loading maneuver negative bilaterally     Palpation:   Mild tenderness to palpation over the lumbar facets bilaterally spanning approximately L1-L5 levels.  Tenderness to palpation at right greater trochanteric bursa, no significant tenderness to palpation at left greater trochanteric bursa      Lumbar spine /hip provocative exam maneuvers  Straight leg raise negative bilaterally  FADIR test negative bilaterally     SI joint tests  NOE test negative bilaterally  Thigh " thrust test negative bilaterally      Key points for the international standards for neurological classification of spinal cord injury (ISNCSCI) to light touch.   Dermatome R L   L2 2 2   L3 2 2   L4 2 2   L5 2 2   S1 2 2   S2 2 2         Motor Exam Lower Extremities  ? Myotome R L   Hip flexion L2 5 5   Knee extension L3 5 5   Ankle dorsiflexion L4 5 5   Toe extension L5 5 5   Ankle plantarflexion S1 5 5        Previous exam  Reflexes  ?   R L   Patella   2+ 2+   Achilles    2+ 2+      Clonus of the ankle negative bilaterally       MEDICAL DECISION MAKING    Medical records review: see under HPI section.     DATA    Labs: No new labs available for review since last visit.   Lab Results   Component Value Date/Time    SODIUM 137 08/17/2023 07:20 AM    SODIUM 137 11/17/2021 10:28 AM    POTASSIUM 4.1 08/17/2023 07:20 AM    POTASSIUM 4.5 11/17/2021 10:28 AM    CHLORIDE 106 08/17/2023 07:20 AM    CHLORIDE 102 11/17/2021 10:28 AM    CO2 25 08/17/2023 07:20 AM    CO2 23 11/17/2021 10:28 AM    ANION 6 08/17/2023 07:20 AM    ANION 12.0 11/17/2021 10:28 AM    GLUCOSE 116 (H) 08/17/2023 07:20 AM    GLUCOSE 91 11/17/2021 10:28 AM    BUN 13 08/17/2023 07:20 AM    BUN 16 11/17/2021 10:28 AM    CREATININE 0.95 08/17/2023 07:20 AM    CREATININE 0.93 11/17/2021 10:28 AM    CALCIUM 9.1 08/17/2023 07:20 AM    CALCIUM 9.1 11/17/2021 10:28 AM    ASTSGOT 14 (L) 07/27/2022 07:35 AM    ASTSGOT 13 11/17/2021 10:28 AM    ALTSGPT 7 (L) 07/27/2022 07:35 AM    ALTSGPT 6 11/17/2021 10:28 AM    TBILIRUBIN 0.8 07/27/2022 07:35 AM    TBILIRUBIN 0.5 11/17/2021 10:28 AM    ALBUMIN 3.9 07/27/2022 07:35 AM    ALBUMIN 4.3 11/17/2021 10:28 AM    TOTPROTEIN 6.2 (L) 07/27/2022 07:35 AM    TOTPROTEIN 6.6 11/17/2021 10:28 AM    GLOBULIN 2.3 (L) 07/27/2022 07:35 AM    GLOBULIN 2.3 11/17/2021 10:28 AM    AGRATIO 1.7 07/27/2022 07:35 AM    AGRATIO 1.9 11/17/2021 10:28 AM       Lab Results   Component Value Date/Time    PROTHROMBTM 15.2 (H) 11/17/2021 10:28 AM     INR 1.24 (H) 11/17/2021 10:28 AM        Lab Results   Component Value Date/Time    WBC 5.5 11/17/2021 10:28 AM    RBC 4.10 (L) 11/17/2021 10:28 AM    HEMOGLOBIN 15.0 11/17/2021 10:28 AM    HEMATOCRIT 44.3 11/17/2021 10:28 AM    .0 (H) 11/17/2021 10:28 AM    MCH 36.6 (H) 11/17/2021 10:28 AM    MCHC 33.9 11/17/2021 10:28 AM    MPV 12.0 11/17/2021 10:28 AM    NEUTSPOLYS 61.70 11/17/2021 10:28 AM    LYMPHOCYTES 24.80 11/17/2021 10:28 AM    MONOCYTES 11.20 11/17/2021 10:28 AM    EOSINOPHILS 1.40 11/17/2021 10:28 AM    BASOPHILS 0.50 11/17/2021 10:28 AM        Lab Results   Component Value Date/Time    HBA1C 6.4 (H) 11/11/2019 08:15 AM        Imaging:   I personally reviewed following images, these are my reads  X-ray hips bilateral 11/30/2022  Mild hip joint OA.  Evidence of facet arthropathy at visualized aspect of her lumbar spine.     CT lumbar spine 7/9/23   Moderate stenosis at L3-4.  Mild stenosis L2-3 and L4-5.  Neuroforaminal stenosis most notable at left L2-3 and L3-4.  Facet arthropathy most notable at bilateral lower lumbar levels. See formal radiology report for further details.      IMAGING radiology reads. I reviewed the following radiology reads   CT lumbar spine 7/9/23   FINDINGS:  There is no compression deformity of the vertebral bodies.  No acute fracture is identified.  There is no dislocation.There is degenerative disc disease facet arthropathy and ligamentum flavum hypertrophy. There is moderate stenosis of the spinal canal   at the L3-L4 level. Mild stenosis is noted at L2-L3 and L4-L5. Mild to moderate multilevel neural foraminal narrowing is also noted. Neural foraminal narrowing is most prominent on the left side at L2-L3 and L3-L4. No change in 3 cm infrarenal abdominal   aortic aneurysm. There is calcific atherosclerosis.     IMPRESSION:     1.  NO ACUTE FRACTURE OR DISLOCATION IS PRESENT IN THE LUMBAR SPINE.     2.  Mild to moderate spinal stenosis is noted which is most prominent  at the L3-L4 level and caused by degenerative disc disease facet arthropathy and ligamentum flavum hypertrophy.     3.  Multilevel neural foraminal narrowing is also mild to moderate in degree.     4.  No change in 3 cm infrarenal abdominal aortic aneurysm.    X-ray hips bilateral 2022     AP pelvis and lateral views of both hips were taken reviewed on 2022.  Minimal degenerative change in both hips.  No fractures or   tumors or any other concerning findings.                                                 Diagnosis  Visit Diagnoses     ICD-10-CM   1. Greater trochanteric bursitis of both hips  M70.61    M70.62   2. Chronic bilateral low back pain with bilateral sciatica  M54.42    M54.41    G89.29   3. Other chronic pain  G89.29   4. Tendinopathy of gluteus medius  M67.959   5. Lumbar stenosis without neurogenic claudication  M48.061   6. Lumbar spondylosis  M47.816   7. Lumbar radiculopathy, chronic  M54.16           ASSESSMENT AND PLAN:  Marty Freedman III (: 1950) is a male      Marty was seen today for follow-up.    Diagnoses and all orders for this visit:    Greater trochanteric bursitis of both hips  -     Referral to Physical Therapy    Chronic bilateral low back pain with bilateral sciatica  -     Referral to Physical Therapy    Other chronic pain    Tendinopathy of gluteus medius  -     Referral to Physical Therapy    Lumbar stenosis without neurogenic claudication  -     Referral to Physical Therapy    Lumbar spondylosis  -     Referral to Physical Therapy    Lumbar radiculopathy, chronic  -     Referral to Pain Clinic  -     Referral to Physical Therapy            PLAN  Physical Therapy: referral to PT today to target low back. The patient has done extensive physical therapy for this problem in the past.  Advised patient to continue exercise program as able.  On exam the pt has exhibited weakness of the hip abductors.  Discussed that this is an important stabilizer of the lumbar  spine and hips, and strengthening this muscle with physical therapy should improve pain.      Diagnostic workup:   - unable to get MRI due to presence of pacemaker  - Personally reviewed at today's visit:  CT lumbar spine 7/9/23      Medications:   -Okay to continue OTC Tylenol and ASA as needed     Interventions:   - bilateral L5-S1 transforaminal epidural steroid injection. The risks, benefits, and alternatives to this procedure were discussed and the patient wishes to proceed with the procedure. Risks include but are not limited to damage to surrounding structures, infection, bleeding, worsening of pain which can be permanent, and weakness which can be permanent. Benefits include pain relief and improved function. Alternatives include not doing the procedure.    - increased risk of bleeding with eliquis. NEED CLEARANCE TO HOLD ELIQUIS prior to procedure for safety reasons  - left greater trochanteric bursa injection PRN  - s/p right greater trochanteric bursa with no significant improvement    Follow-up: 1 month after injection    Orders Placed This Encounter    Referral to Pain Clinic    Referral to Physical Therapy       Angelica Hong MD  Interventional Pain and Spine  Physical Medicine and Rehabilitation  [facility]      The above note documents my personal evaluation of this patient. In addition, I have reviewed and confirmed with the patient and MA the supportive information documented in today's Patient Health Questionnaire and Office Note.     Please note that this dictation was created using voice recognition software. I have made every reasonable attempt to correct obvious errors, but I expect that there are errors of grammar and possibly content that I did not discover before finalizing the note.

## 2023-11-21 ENCOUNTER — HOSPITAL ENCOUNTER (OUTPATIENT)
Facility: REHABILITATION | Age: 73
End: 2023-11-21
Attending: STUDENT IN AN ORGANIZED HEALTH CARE EDUCATION/TRAINING PROGRAM | Admitting: STUDENT IN AN ORGANIZED HEALTH CARE EDUCATION/TRAINING PROGRAM
Payer: MEDICARE

## 2023-11-21 ENCOUNTER — APPOINTMENT (OUTPATIENT)
Dept: RADIOLOGY | Facility: REHABILITATION | Age: 73
End: 2023-11-21
Attending: STUDENT IN AN ORGANIZED HEALTH CARE EDUCATION/TRAINING PROGRAM
Payer: MEDICARE

## 2023-11-21 VITALS
HEART RATE: 60 BPM | RESPIRATION RATE: 16 BRPM | WEIGHT: 197.75 LBS | TEMPERATURE: 97.7 F | DIASTOLIC BLOOD PRESSURE: 57 MMHG | BODY MASS INDEX: 28.31 KG/M2 | HEIGHT: 70 IN | SYSTOLIC BLOOD PRESSURE: 129 MMHG | OXYGEN SATURATION: 95 %

## 2023-11-21 PROCEDURE — 64483 NJX AA&/STRD TFRM EPI L/S 1: CPT

## 2023-11-21 PROCEDURE — 700111 HCHG RX REV CODE 636 W/ 250 OVERRIDE (IP)

## 2023-11-21 PROCEDURE — 700117 HCHG RX CONTRAST REV CODE 255

## 2023-11-21 RX ORDER — LIDOCAINE HYDROCHLORIDE 10 MG/ML
INJECTION, SOLUTION EPIDURAL; INFILTRATION; INTRACAUDAL; PERINEURAL
Status: COMPLETED
Start: 2023-11-21 | End: 2023-11-21

## 2023-11-21 RX ORDER — DEXAMETHASONE SODIUM PHOSPHATE 10 MG/ML
INJECTION, SOLUTION INTRAMUSCULAR; INTRAVENOUS
Status: COMPLETED
Start: 2023-11-21 | End: 2023-11-21

## 2023-11-21 RX ADMIN — DEXAMETHASONE SODIUM PHOSPHATE 10 MG: 10 INJECTION, SOLUTION INTRAMUSCULAR; INTRAVENOUS at 14:18

## 2023-11-21 RX ADMIN — LIDOCAINE HYDROCHLORIDE 10 ML: 10 INJECTION, SOLUTION EPIDURAL; INFILTRATION; INTRACAUDAL at 14:17

## 2023-11-21 RX ADMIN — IOHEXOL 3 ML: 240 INJECTION, SOLUTION INTRATHECAL; INTRAVASCULAR; INTRAVENOUS; ORAL at 14:18

## 2023-11-21 ASSESSMENT — PAIN DESCRIPTION - PAIN TYPE: TYPE: CHRONIC PAIN

## 2023-11-21 NOTE — OP REPORT
Date of Service: 11/21/2023     Patient: Marty Freedman III 73 y.o. male     MRN: [id number]     Physician/s: Angelica Hong MD    Pre-operative Diagnosis: Lumbar radiculopathy    Post-operative Diagnosis: Lumbar radiculopathy    Procedure: Lumbar Transforaminal Epidural Steroid Injection at the  right and left  L5-S1 levels.     Description of procedure:    The risks, benefits, and alternatives of the procedure were reviewed and discussed with the patient.  Written informed consent was freely obtained. A pre-procedural time-out was conducted by the physician verifying patient’s identity, procedure to be performed, procedure site and side, and allergy verification. Appropriate equipment was determined to be in place for the procedure.     The patient's vital signs were carefully monitored before, throughout, and after the procedure.     In the fluoroscopy suite the patient was placed in a prone position, a pillow placed underneath their umbilicus. The skin was prepped and draped in the usual sterile fashion.     The fluoroscope was placed over the lumbar spine and adjusted into the proper AP/Oblique view to enter the transforaminal space just adjacent to the pedicle at the levels below. The targets for injection were then marked at the right and left L5-S1. A 27g 1.5 inch needle was placed into the marked site, and approximately 1mL of 1% Lidocaine was injected subcutaneously into the epidermal and dermal layers. The needle was removed intact.  A 25g 3.5 inch spinal needle was then placed and advanced under fluoroscopic guidance in an oblique view towards the epidural space of the levels noted above. The needle position was confirmed to not be past the 6 o'clock position in the AP view and it was in the neuroforamen in the lateral view.     Under live fluoroscopic guidance in the AP view, contrast dye was injected and was seen both inside and outside the epidural space. The needles were adjusted and osteophytes were  encountered. With the needle tips still remaining not past the 6 o'clock position in the AP view and in the neuroforamen in the lateral view, and subsequent injection of contrast dye was still seen inside and outside the epidural space at the levels above. Final fluoroscopic images were saved.  Following negative aspiration, 1.5mL of 1% lidocaine preservative free with 0.5mL of 10mg/mL of dexamethasone was then injected at each level above, and the needles were removed intact after being restyleted. The patient's back was covered with a 4x4 gauze, the area was cleansed with sterile normal saline, and a dressing was applied. There were no complications noted.     The patient was then evaluated post-procedure, and was hemodynamically stable prior to leaving the post-operative care unit.     Follow-up as scheduled    Angelica Hong MD  Interventional Pain and Spine  Physical Medicine and Rehabilitation  [facility]    Pain score prior to injection: 2/10 on NRS  Pain score immediately after injection: 0/10 on NRS    CPT codes  Transforaminal epidural injection- lumbar or sacral (first level):  89808-53

## 2023-11-21 NOTE — INTERVAL H&P NOTE
H&P reviewed. The patient was examined and there are no changes to the H&P. Patient elected to stop eliquis on his own on 11/16/23.    Angelica Hong MD  Interventional Pain and Spine  Physical Medicine and Rehabilitation  [facility]

## 2023-11-21 NOTE — PROGRESS NOTES
1437: Rec'd pt from procedure room, pt ambulatory to chair, steady on feet, tolerating liquids. Dressing CDI.  Ice pack placed to incision site.    1646: Dr. Hong in to see patient, meets D/C criteria.    1446: Patient d/c'd to designated , placed in passenger seat.

## 2023-12-01 ENCOUNTER — TELEPHONE (OUTPATIENT)
Dept: HEALTH INFORMATION MANAGEMENT | Facility: OTHER | Age: 73
End: 2023-12-01
Payer: MEDICARE

## 2023-12-13 ENCOUNTER — APPOINTMENT (OUTPATIENT)
Dept: PHYSICAL MEDICINE AND REHAB | Facility: MEDICAL CENTER | Age: 73
End: 2023-12-13
Payer: MEDICARE

## 2024-01-03 ENCOUNTER — DOCUMENTATION (OUTPATIENT)
Dept: VASCULAR LAB | Facility: MEDICAL CENTER | Age: 74
End: 2024-01-03

## 2024-01-03 ENCOUNTER — HOSPITAL ENCOUNTER (OUTPATIENT)
Dept: RADIOLOGY | Facility: MEDICAL CENTER | Age: 74
End: 2024-01-03
Attending: INTERNAL MEDICINE
Payer: MEDICARE

## 2024-01-03 DIAGNOSIS — I71.9 AORTIC ANEURYSM WITHOUT RUPTURE, UNSPECIFIED PORTION OF AORTA (HCC): ICD-10-CM

## 2024-01-03 PROCEDURE — 93978 VASCULAR STUDY: CPT

## 2024-01-03 NOTE — Clinical Note
Bc - update saige KO - let patient know that aneurysm is small and stable. Overdue for vasc med. Pls schedule f/u with apn

## 2024-01-04 NOTE — PROGRESS NOTES
Duplex with small stable AAA  Will repeat in 2 years - jan 2026  Overdue for [facility] med f/u - Ma to contact patient.    Michael Bloch, MD  Vascular Care

## 2024-01-05 ENCOUNTER — DOCUMENTATION (OUTPATIENT)
Dept: VASCULAR LAB | Facility: MEDICAL CENTER | Age: 74
End: 2024-01-05
Payer: MEDICARE

## 2024-01-05 PROCEDURE — 93978 VASCULAR STUDY: CPT | Mod: 26 | Performed by: INTERNAL MEDICINE

## 2024-02-13 ENCOUNTER — OFFICE VISIT (OUTPATIENT)
Dept: VASCULAR LAB | Facility: MEDICAL CENTER | Age: 74
End: 2024-02-13
Attending: NURSE PRACTITIONER
Payer: MEDICARE

## 2024-02-13 VITALS
DIASTOLIC BLOOD PRESSURE: 61 MMHG | HEIGHT: 70 IN | HEART RATE: 70 BPM | BODY MASS INDEX: 27.77 KG/M2 | WEIGHT: 194 LBS | SYSTOLIC BLOOD PRESSURE: 96 MMHG

## 2024-02-13 DIAGNOSIS — I71.9 AORTIC ANEURYSM WITHOUT RUPTURE, UNSPECIFIED PORTION OF AORTA (HCC): ICD-10-CM

## 2024-02-13 DIAGNOSIS — I48.20 CHRONIC ATRIAL FIBRILLATION (HCC): ICD-10-CM

## 2024-02-13 PROCEDURE — 99214 OFFICE O/P EST MOD 30 MIN: CPT | Performed by: NURSE PRACTITIONER

## 2024-02-13 PROCEDURE — 3078F DIAST BP <80 MM HG: CPT | Performed by: NURSE PRACTITIONER

## 2024-02-13 PROCEDURE — 99212 OFFICE O/P EST SF 10 MIN: CPT

## 2024-02-13 PROCEDURE — 3074F SYST BP LT 130 MM HG: CPT | Performed by: NURSE PRACTITIONER

## 2024-02-13 RX ORDER — DAPAGLIFLOZIN 10 MG/1
10 TABLET, FILM COATED ORAL DAILY
COMMUNITY

## 2024-02-13 RX ORDER — SACUBITRIL AND VALSARTAN 24; 26 MG/1; MG/1
1 TABLET, FILM COATED ORAL 2 TIMES DAILY
COMMUNITY

## 2024-02-13 RX ORDER — AMIODARONE HYDROCHLORIDE 200 MG/1
200 TABLET ORAL DAILY
COMMUNITY

## 2024-02-13 ASSESSMENT — ENCOUNTER SYMPTOMS
CHILLS: 0
DIZZINESS: 0
NAUSEA: 0
WEAKNESS: 0
HEMOPTYSIS: 0
WHEEZING: 0
SHORTNESS OF BREATH: 0
SEIZURES: 0
VOMITING: 0
MYALGIAS: 0
HEADACHES: 0
TREMORS: 0
FOCAL WEAKNESS: 0
DIARRHEA: 0
COUGH: 0
FEVER: 0
ABDOMINAL PAIN: 0
PALPITATIONS: 0
BRUISES/BLEEDS EASILY: 0

## 2024-02-13 ASSESSMENT — FIBROSIS 4 INDEX: FIB4 SCORE: 2.44

## 2024-02-13 NOTE — PROGRESS NOTES
Follow up VASCULAR MEDICINE VISIT  Subjective:   Marty Freedman III is a 73 y.o. male  who presents today 2024  for   Chief Complaint   Patient presents with    Follow-Up     initially referred by Sury Cueva APRN for med mgmt and surveillance of AAA     HPI:  Pt has not been seen in 3 years, follows closely with his PCP and cardiologist in [place]     AAA:   Interval hx:  Feeling well. No concerns   Denies abd pain, distal ischemic limb symptoms such as discoloration, cool extremities, claudication.   Current size:  3.2 per AAA duplex on 2020, repeat 2024 no significant change  Pertinent pmhx:  First noted on LDCT for lung CA screening on .  Fhx of father with AAA, unfortunately  postoperative from GI complications from surgery.    HTN:  Current HTN concerns: Denies   Current ADRs: No  HTN sx:  No current blurred or changed vision, chest pain, shortness of breath, headache, nausea, dizziness/vertigo   Home BP los/50-60s most days   24h ABPM completed: not tested  Adherence to current HTN meds: compliant all of the time  Lifestyle factors:   Weight Change since last visit: stable   BMI Readings from Last 5 Encounters:   24 27.84 kg/m²   23 28.37 kg/m²   23 28.15 kg/m²   23 27.99 kg/m²   23 27.99 kg/m²     Diet pattern changes since last visit: trying to follow DM diet  Daily salt intake estimate:  Low     Perceived barriers to care: none      Hyperlipidemia:    Stable, no current concerns  Current treatment: Moderate intensity statin  prava   Myalgias? No  Other adverse drug reactions? No  Last lipid profile: reviewed with patient, as noted below   Hx of clinical ASCVD events: History of prior MI >12 months ago . Initial MI age 38, s/p CABG , PAF, cardiomyopathy    Antiplatelet/anticoagulation:   Yes, Details: Eliquis, ASA , no bleeding noted     Hypothyroidism:  Tolerating meds  On amiodarone as a new med recently   Last TSH elevated- following  up with PCP        Current Outpatient Medications:     amiodarone, 200 mg, Oral, DAILY, Taking    Entresto, 1 Tablet, Oral, BID, Taking    dapagliflozin propanediol, 10 mg, Oral, DAILY, Taking    levothyroxine, 88 mcg, Oral, AM ES, Taking    spironolactone, , Taking    magnesium oxide, 1 Tablet, Oral, DAILY, Taking    furosemide, TAKE 1 TABLET BY MOUTH ONCE DAILY AS NEEDED FOR ANKLE SWELLING, PRN    metoprolol SR, 1 Tablet, Oral, BID, Taking    albuterol, 2 Puff, Inhalation, Q6HRS PRN, PRN    rosuvastatin, 40 mg, Oral, DAILY, Taking    ammonium lactate, 1 Application , Topical, TID PRN, PRN    nitroglycerin, 0.4 mg, Sublingual, PRN, PRN    apixaban, 5 mg, Oral, BID, Taking    aspirin EC, 81 mg, Oral, DAILY, Taking   No Known Allergies  Family History   Problem Relation Age of Onset    Heart Attack Mother     Heart Disease Mother     Hypertension Mother     Cancer Father     Heart Attack Father         s/p CABG    Heart Disease Father         AAA    Hypertension Father     Heart Attack Paternal Grandmother     Heart Attack Paternal Grandfather       Social History     Tobacco Use    Smoking status: Every Day     Current packs/day: 1.00     Average packs/day: 1 pack/day for 45.0 years (45.0 ttl pk-yrs)     Types: Cigarettes    Smokeless tobacco: Never    Tobacco comments:     Working on quitting (7/26/23)   Vaping Use    Vaping Use: Never used   Substance Use Topics    Alcohol use: Not Currently     Alcohol/week: 0.6 oz     Types: 1 Cans of beer per week     Comment: monthly    Drug use: No     Review of Systems   Constitutional:  Negative for chills, fever and malaise/fatigue.   Respiratory:  Negative for cough, hemoptysis, shortness of breath and wheezing.    Cardiovascular:  Negative for chest pain, palpitations and leg swelling.   Gastrointestinal:  Negative for abdominal pain, diarrhea, nausea and vomiting.   Musculoskeletal:  Negative for joint pain and myalgias.   Skin:  Negative for itching and rash.  "  Neurological:  Negative for dizziness, tremors, focal weakness, seizures, weakness and headaches.   Endo/Heme/Allergies:  Does not bruise/bleed easily.      Objective:     Vitals:    02/13/24 1033   BP: 96/61   BP Location: Left arm   Patient Position: Sitting   BP Cuff Size: Adult   Pulse: 70   Weight: 88 kg (194 lb)   Height: 1.778 m (5' 10\")      BP Readings from Last 5 Encounters:   02/13/24 96/61   11/21/23 129/57   11/09/23 114/60   07/27/23 102/56   07/26/23 112/60      Body mass index is 27.84 kg/m².  Physical Exam  Vitals reviewed.   Constitutional:       Appearance: Normal appearance.   HENT:      Head: Normocephalic and atraumatic.      Nose: Nose normal.      Mouth/Throat:      Mouth: Mucous membranes are moist.      Pharynx: Oropharynx is clear.   Eyes:      Extraocular Movements: Extraocular movements intact.      Conjunctiva/sclera: Conjunctivae normal.   Cardiovascular:      Rate and Rhythm: Normal rate and regular rhythm.      Pulses: Normal pulses.           Carotid pulses are 2+ on the right side and 2+ on the left side.       Radial pulses are 2+ on the right side and 2+ on the left side.      Heart sounds: Normal heart sounds.      Comments:    Pulmonary:      Effort: Pulmonary effort is normal.      Breath sounds: Normal breath sounds.   Abdominal:      General: Abdomen is flat. Bowel sounds are normal.      Palpations: Abdomen is soft. There is no mass.   Musculoskeletal:      Cervical back: Normal range of motion and neck supple.      Right lower leg: No edema.      Left lower leg: No edema.   Skin:     General: Skin is warm and dry.      Capillary Refill: Capillary refill takes less than 2 seconds.   Neurological:      General: No focal deficit present.      Mental Status: He is alert and oriented to person, place, and time. Mental status is at baseline.   Psychiatric:         Mood and Affect: Mood normal.         Behavior: Behavior normal.         Lab Results   Component Value Date    " HBA1C 6.5 (H) 12/29/2023      Lab Results   Component Value Date    SODIUM 137 08/17/2023    POTASSIUM 4.1 08/17/2023    CHLORIDE 106 08/17/2023    CO2 25 08/17/2023    GLUCOSE 116 (H) 08/17/2023    BUN 13 08/17/2023    CREATININE 0.95 08/17/2023    GLOMRATE 78 08/17/2023        Lab Results   Component Value Date    RBC 3.51 (L) 12/29/2023    HEMOGLOBIN 13.0 12/29/2023    HEMATOCRIT 37.2 (L) 12/29/2023    .2 (H) 12/29/2023    MCH 37.2 (H) 12/29/2023    MCHC 35.0 12/29/2023    MPV 9.7 12/29/2023     VASCULAR IMAGING:     LDCT 2018  3.  Small 3 cm abdominal aortic aneurysm, partially visualized.    LDCT lung CA 12/23/20  2.2 mm and 6.8 mm nodules at the left lung unchanged.  Punctate nodule left lower lobe new since previous examination.  Hyperexpanded lungs. No pleural effusions.  Atherosclerotic changes.   Lung RADS: 2 - Benign Appearance or Behavior  Nodules with a very low likelihood of becoming a clinically active cancer due to size or lack of growth  Moderate calcified plaque is located within the aorta.   Calcifications are located within the aortic valve.   There are coronary artery calcifications.   Sternotomy wires are demonstrated.  Anterior bypass grafts are demonstrated.    AAA duplex 12/23/20   3.2 cm infrarenal abdominal aortic aneurysm previously measured 3.1 cm.    Medical Decision Making:  Today's Assessment / Status / Plan:     1. Aortic aneurysm without rupture, unspecified portion of aorta (HCC)        2. Chronic atrial fibrillation (HCC)          Patient Type: Secondary Prevention, polyvascular disease    Etiology of Established CVD if Present:   1) AAA  2) CAD, s/p CABG    AAA, asymptomatic, mild   3.2cm on most recent imaging 12/2020  Stable, no sx   - continue med mgmt and tobacco cessation efforts (see below)   - surveillance AAA duplex in 2 years - (Jan 2026)    - consider repeat CTA if rapid expansion     CAD, s/p CABG in 1991  Stable, no sx  Early MI - strong fhx of CAD on paternal  side , possible FH   - continue secondary med mgmt   - continue ongoing care with cardiology     ANTITHROMBOTIC THERAPY:  Anti-Platelet/Anti-Coagulant Tx recommended: yes  Indication: PAF, CVA proph - high LISA-vasc    Expected duration: indefinite    Last CBC, BMP: 12/2023  Cardiology recently DC'd ASA   Antithrombotic therapy plan:  - continue eliquis 5mg BID,   - monitor for bleeding, annual review of risks/benefits   - check CBC, BMP (CMP if any underlying liver disease), and monitor CrCl as needed Q6mo while on DOAC  - avoid anti-inflammatories (eg. Advil, ibuprofen, Aleve, naproxen, etc) while anticoagulated   - avoid skiing or other dangerous activities to reduce risk of head injury and brain bleeds  - recommended to see your PCP to discuss if you need age-appropriate cancer screenings as a small % of blood clots may be caused by an underlying malignancy  - if any bleeding lasting 30min without stopping, please seek care with your PCP, urgent care, or ED  - reversal agents for most blood thinners are now available and used if you have major bleeding    LIPID MANAGEMENT:   Qualifies for Statin Therapy Based on 2018 ACC/AHA Guidelines: yes, Secondary Prevention - Very high risk ASCVD - 2 major events or 1 event with other high-risk conditions  10-yr ASCVD risk score:  N/A  Major ASCVD events: History of prior MI >12 months ago   High-risk conditions: Prior CABG or PCI outside of the major ASCVD events , Hypertension  and Current smoking   Risk-enhancers: N/A  Currently on Statin: Yes  Strong Fhx of early CAD, ? Possible FH though I do not have baseline LDL >190 available in labs   Treatment goals: LDL-C <70 (consider non-HDL-C <100, apoB <80)  At goal? Yes 12/2023, elevated trigs  Cards started him on prescription fish oil   Plan:   - reinforced ongoing TLC measures as noted   - monitor labs   Meds:   - continue rosuva 40mg daily  - add zetia as next agent  - [id number] candidate due to secondary prevention       BLOOD PRESSURE MANAGEMENT:  ACC/AHA (2017) goal <130/80  Home BP at goal:  yes  Office BP at goal:  yes  Indications of end organ damage: yes  Device candidate? no  Plan:   Monitoring:   - start/continue home BP monitoring, reviewed correct technique, provide BP log and instructions  - order 24h ABPM:  NO  - monitor lytes/gfr routinely   - contact office if BP consistently >140/>90 to discussion of tx adjustments   Medications:  ACEi/ARB: stopped by PCP when started Entresto  DHP-CCB: none   Thiazide: none   Rao-receptor Antagonist: not indicated at this time   Other:   Peripheral Alpha Blocker: not indicated   Loop: not indicated   BB: continue metoprolol 25mg BID -mainly for rate-control in Afib     Glycemic Status: T2DM  Recent new diagnosis  - Goal A1c < 7  Lab Results   Component Value Date    HBA1C 6.5 (H) 12/29/2023    Plan:  - continue current medication plan as managed by PCP  - recommmend for routine care with PCP (or endocrine) to include regular A1c monitoring, annual albumin/creatinine ratio (ACR), annual diabetic retinopathy screening, foot exams, annual flu vaccine, and updates to pneumonia vaccines as appropriate     Smoking: contemplative stage  Down to 6 cig per day   Prior cessation for 2-3yrs via [facility], unfortunately returned.   Has tried NRT in the past w/o relief.   Has tried Chantix - trouble with bad dreams.      reports that he has been smoking cigarettes. He has a 45.0 pack-year smoking history. He has never used smokeless tobacco.   Provided strong recommendation for complete cessation and informed this is the primary contributor to the majority of all ASCVD and cancer-related conditions and can result in significant morbidity and early mortality.   - reviewed resources for cessation including tobacco cessation clinic visit, pharmacotx meds, quit lines  - review at every visit   Provided 3 minutes of face-to-face counseling on above topics     Physical Activity:  continue healthy activity to improve CV fitness, see care instructions for additional details     Weight Management and Nutrition: Dietary plan was discussed with patient at this visit including DASH, low sodium and/or as outlined in care instructions     Other:     1) PAF, s/p ablation  - continue OAC, BB  - defer ongoing care to cardiology     2) hypothyroidism  Last TSH elevated, adjusting per PCP  On amiodarone  Uncontrolled will have effect on BP and HLD mgmt   - continue meds, update labs, defer mgmt to PCP     Instructed to follow-up with PCP for remainder of adult medical needs: yes  We will partner with other providers in the management of established vascular disease and cardiometabolic risk factors.    Studies to Be Obtained: Ao/iliac duplex - 1/2026    Labs to Be Obtained: per PCP     Follow up in: 2 years     TORSTEN Rojas.  [facility]   [facility]   [phone]     [id number]

## 2024-03-08 DIAGNOSIS — E03.9 HYPOTHYROIDISM, UNSPECIFIED TYPE: ICD-10-CM

## 2024-03-08 RX ORDER — LEVOTHYROXINE SODIUM 88 UG/1
88 TABLET ORAL
Qty: 100 TABLET | Refills: 2 | Status: SHIPPED | OUTPATIENT
Start: 2024-03-08

## 2024-03-08 NOTE — TELEPHONE ENCOUNTER
Received request via: Patient    Was the patient seen in the last year in this department? Yes    Does the patient have an active prescription (recently filled or refills available) for medication(s) requested? No    Pharmacy Name: [facility]    Does the patient have residential Plus and need 100 day supply (blood pressure, diabetes and cholesterol meds only)? Yes, quantity updated to 100 days

## 2024-04-24 ENCOUNTER — PATIENT OUTREACH (OUTPATIENT)
Dept: HEALTH INFORMATION MANAGEMENT | Facility: OTHER | Age: 74
End: 2024-04-24

## 2024-04-24 ENCOUNTER — OFFICE VISIT (OUTPATIENT)
Dept: MEDICAL GROUP | Facility: PHYSICIAN GROUP | Age: 74
End: 2024-04-24
Payer: MEDICARE

## 2024-04-24 ENCOUNTER — HOSPITAL ENCOUNTER (OUTPATIENT)
Facility: MEDICAL CENTER | Age: 74
End: 2024-04-24
Attending: STUDENT IN AN ORGANIZED HEALTH CARE EDUCATION/TRAINING PROGRAM
Payer: MEDICARE

## 2024-04-24 ENCOUNTER — PATIENT MESSAGE (OUTPATIENT)
Dept: HEALTH INFORMATION MANAGEMENT | Facility: OTHER | Age: 74
End: 2024-04-24

## 2024-04-24 VITALS
SYSTOLIC BLOOD PRESSURE: 96 MMHG | RESPIRATION RATE: 12 BRPM | WEIGHT: 193.78 LBS | HEIGHT: 70 IN | OXYGEN SATURATION: 92 % | BODY MASS INDEX: 27.74 KG/M2 | TEMPERATURE: 96.4 F | DIASTOLIC BLOOD PRESSURE: 58 MMHG | HEART RATE: 86 BPM

## 2024-04-24 DIAGNOSIS — E03.9 HYPOTHYROIDISM, UNSPECIFIED TYPE: ICD-10-CM

## 2024-04-24 DIAGNOSIS — I25.10 CORONARY ARTERY DISEASE INVOLVING NATIVE CORONARY ARTERY OF NATIVE HEART WITHOUT ANGINA PECTORIS: ICD-10-CM

## 2024-04-24 DIAGNOSIS — E11.9 TYPE 2 DIABETES MELLITUS WITHOUT COMPLICATION, WITHOUT LONG-TERM CURRENT USE OF INSULIN (HCC): ICD-10-CM

## 2024-04-24 DIAGNOSIS — R91.8 LUNG NODULES: ICD-10-CM

## 2024-04-24 DIAGNOSIS — I10 ESSENTIAL HYPERTENSION: ICD-10-CM

## 2024-04-24 DIAGNOSIS — I47.20 VENTRICULAR TACHYCARDIA (HCC): ICD-10-CM

## 2024-04-24 DIAGNOSIS — J44.9 CHRONIC OBSTRUCTIVE PULMONARY DISEASE, UNSPECIFIED COPD TYPE (HCC): ICD-10-CM

## 2024-04-24 PROCEDURE — 99214 OFFICE O/P EST MOD 30 MIN: CPT | Mod: 25 | Performed by: STUDENT IN AN ORGANIZED HEALTH CARE EDUCATION/TRAINING PROGRAM

## 2024-04-24 PROCEDURE — 3078F DIAST BP <80 MM HG: CPT | Performed by: STUDENT IN AN ORGANIZED HEALTH CARE EDUCATION/TRAINING PROGRAM

## 2024-04-24 PROCEDURE — 82043 UR ALBUMIN QUANTITATIVE: CPT

## 2024-04-24 PROCEDURE — 3074F SYST BP LT 130 MM HG: CPT | Performed by: STUDENT IN AN ORGANIZED HEALTH CARE EDUCATION/TRAINING PROGRAM

## 2024-04-24 PROCEDURE — 82570 ASSAY OF URINE CREATININE: CPT

## 2024-04-24 PROCEDURE — G0439 PPPS, SUBSEQ VISIT: HCPCS | Performed by: STUDENT IN AN ORGANIZED HEALTH CARE EDUCATION/TRAINING PROGRAM

## 2024-04-24 RX ORDER — TIOTROPIUM BROMIDE AND OLODATEROL 3.124; 2.736 UG/1; UG/1
2 SPRAY, METERED RESPIRATORY (INHALATION) DAILY
Qty: 4 G | Refills: 1 | Status: SHIPPED | OUTPATIENT
Start: 2024-04-24

## 2024-04-24 ASSESSMENT — ACTIVITIES OF DAILY LIVING (ADL): BATHING_REQUIRES_ASSISTANCE: 0

## 2024-04-24 ASSESSMENT — PATIENT HEALTH QUESTIONNAIRE - PHQ9: CLINICAL INTERPRETATION OF PHQ2 SCORE: 0

## 2024-04-24 ASSESSMENT — ENCOUNTER SYMPTOMS: GENERAL WELL-BEING: FAIR

## 2024-04-24 ASSESSMENT — FIBROSIS 4 INDEX: FIB4 SCORE: 2.82

## 2024-04-24 NOTE — ASSESSMENT & PLAN NOTE
This is a new diagnosis found on A1c screening by his vascular medicine team  Last A1c 3 months ago 6.5  On Farxiga 10 mg daily  He does admit to eating quite a lot of sugar including many carbohydrates.  His A1c for diabetic is within goal.  He is to continue the Farxiga and work on his diet and exercise.

## 2024-04-24 NOTE — ASSESSMENT & PLAN NOTE
S/p dual chamber ICD implantation in 2016.  Failed Amiodarone, s/p VT ablation (Dr. Campbell 11/22/21).     Followed by cardiology.  Currently on medications for rate control for A-fib amiodarone 200 mg daily, metoprolol 50 mg twice daily

## 2024-04-24 NOTE — PROGRESS NOTES
Chief Complaint   Patient presents with    Medicare Annual Wellness       HPI:  Marty Freedman III is a 74 y.o. here for Medicare Annual Wellness Visit       Problem   Type 2 Diabetes Mellitus Without Complication, Without Long-Term Current Use of Insulin (Hcc)   Ventricular Tachycardia (Hcc)   Hypothyroidism    Last TSH, T4 within normal limits 6 months ago.  Currently on levothyroxine 88 mcg daily.     Chronic Obstructive Pulmonary Disease (Hcc)    Feels like he is getting increasing shortness of breath.  Seldom using his albuterol inhaler.  Down to 6 cigarettes a day     Lung Nodules        Patient Active Problem List    Diagnosis Date Noted    Type 2 diabetes mellitus without complication, without long-term current use of insulin (HCC) 04/24/2024    Chronic bilateral low back pain with bilateral sciatica 05/24/2023    Hip pain, bilateral 07/29/2022    Xerosis of skin 04/29/2022    H/O cardiac radiofrequency ablation: 11/22/21.  RFA of 3 different VT morphologies from lateral wall scar.  Dr Campbell  11/23/2021    Current every day smoker 10/21/2021    Peripheral cyanosis 06/04/2021    Chronic anticoagulation 04/02/2021    Hx of CABG 04/02/2021    Coronary artery disease involving native coronary artery of native heart without angina pectoris 04/02/2021    Decreased hearing of both ears 03/10/2021    AICD (automatic cardioverter/defibrillator) present 12/09/2020    Ventricular tachycardia (HCC) 12/09/2020    Hypothyroidism 11/27/2019    Vitamin D deficiency 11/27/2019    Chronic obstructive pulmonary disease (HCC) 09/12/2018    Lung nodules 09/12/2018    Aortic aneurysm (HCC) 09/12/2018    Cardiomyopathy (HCC) 08/08/2018    Essential hypertension 07/26/2018    History of MI (myocardial infarction) 07/26/2018    Cigarette nicotine dependence without complication 07/26/2018    Pacemaker 07/26/2018    Chronic atrial fibrillation (HCC) 07/26/2018    Mixed hyperlipidemia 07/26/2018       Current Outpatient Medications    Medication Sig Dispense Refill    Tiotropium Bromide-Olodaterol (STIOLTO RESPIMAT) 2.5-2.5 MCG/ACT Aero Soln Inhale 2 Puffs every day. 4 g 1    levothyroxine (SYNTHROID) 88 MCG Tab Take 1 Tablet by mouth every morning on an empty stomach. 100 Tablet 2    amiodarone (CORDARONE) 200 MG Tab Take 200 mg by mouth every day.      sacubitril-valsartan (ENTRESTO) 24-26 MG Tab Take 1 Tablet by mouth 2 times a day.      dapagliflozin propanediol (FARXIGA) 10 MG Tab Take 10 mg by mouth every day.      spironolactone (ALDACTONE) 25 MG Tab       magnesium oxide (MAG-OX) 400 MG Tab tablet Take 1 Tablet by mouth every day.      metoprolol SR (TOPROL XL) 50 MG TABLET SR 24 HR Take 1 Tablet by mouth 2 times a day.      albuterol 108 (90 Base) MCG/ACT Aero Soln inhalation aerosol Inhale 2 Puffs every 6 hours as needed for Shortness of Breath. 1 Each 1    rosuvastatin (CRESTOR) 40 MG tablet Take 40 mg by mouth every day.      ammonium lactate (LAC-HYDRIN) 12 % Lotion Apply 1 Application topically 3 times a day as needed (dry skin). 225 g 1    nitroglycerin (NITROSTAT) 0.4 MG SL Tab Place 0.4 mg under the tongue as needed.      apixaban (ELIQUIS) 5mg Tab Take 5 mg by mouth 2 Times a Day.       No current facility-administered medications for this visit.          Current supplements as per medication list.     Allergies: Patient has no known allergies.    Current social contact/activities: social, projects      He  reports that he has been smoking cigarettes. He has a 45 pack-year smoking history. He has never used smokeless tobacco. He reports that he does not currently use alcohol after a past usage of about 0.6 oz of alcohol per week. He reports that he does not use drugs.  Ready to quit: Not Answered  Counseling given: Not Answered  Tobacco comments: Working on quitting (7/26/23)      ROS:    Gait: Uses no assistive device  Ostomy: No  Other tubes: No  Amputations: No  Chronic oxygen use: No  Last eye exam: 2 years ago, has appt  next week   Wears hearing aids: No   : Denies any urinary leakage during the last 6 months    Screening:  Has appointment coming up for colon cancer screening    Depression Screening  Little interest or pleasure in doing things?  0 - not at all  Feeling down, depressed , or hopeless? 0 - not at all  Patient Health Questionnaire Score: 0     If depressive symptoms identified deferred to follow up visit unless specifically addressed in assessment and plan.    Interpretation of PHQ-9 Total Score   Score Severity   1-4 No Depression   5-9 Mild Depression   10-14 Moderate Depression   15-19 Moderately Severe Depression   20-27 Severe Depression    Screening for Cognitive Impairment  Do you or any of your friends or family members have any concern about your memory? No  Three Minute Recall (Leader, Season, Table) 3/3    Renan clock face with all 12 numbers and set the hands to show 10 minutes after 11.  Yes    Cognitive concerns identified deferred for follow up unless specifically addressed in assessment and plan.    Fall Risk Assessment  Has the patient had two or more falls in the last year or any fall with injury in the last year?  No    Safety Assessment  Do you always wear your seatbelt?  Yes  Any changes to home needed to function safely? No  Difficulty hearing.  Yes  Patient counseled about all safety risks that were identified.    Functional Assessment ADLs  Are there any barriers preventing you from cooking for yourself or meeting nutritional needs?  No.    Are there any barriers preventing you from driving safely or obtaining transportation?  No.    Are there any barriers preventing you from using a telephone or calling for help?  No    Are there any barriers preventing you from shopping?  No.    Are there any barriers preventing you from taking care of your own finances?  No    Are there any barriers preventing you from managing your medications?  No    Are there any barriers preventing you from showering,  bathing or dressing yourself? No    Are there any barriers preventing you from doing housework or laundry? No  Are there any barriers preventing you from using the toilet?No  Are you currently engaging in any exercise or physical activity?  Yes.      Self-Assessment of Health  What is your perception of your health? Fair  Do you sleep more than six hours a night? Yes  In the past 7 days, how much did pain keep you from doing your normal work? None  Do you spend quality time with family or friends (virtually or in person)? Yes  Do you usually eat a heart healthy diet that constists of a variety of fruits, vegetables, whole grains and fiber? Yes  Do you eat foods high in fat and/or Fast Food more than three times per week? Yes    Advance Care Planning  Do you have an Advance Directive, Living Will, Durable Power of , or POLST?                   Health Maintenance Summary            Overdue - Hepatitis C Screening (Once) Never done      No completion history exists for this topic.              Ordered - Diabetes: Urine Protein Screening (Yearly) Ordered on 4/24/2024      No completion history exists for this topic.              Overdue - Diabetes: Monofilament / LE Exam (Yearly) Never done      No completion history exists for this topic.              Overdue - Diabetes: Retinopathy Screening (Yearly) Never done      No completion history exists for this topic.              Ordered - Annual Pulmonary Function Test / Spirometry (Yearly) Ordered on 4/24/2024      01/10/2019  AMB PULMONARY FUNCTION TEST/LAB              Overdue - COVID-19 Vaccine (4 - 2023-24 season) Overdue since 9/1/2023 11/04/2022  Outside Immunization: COVID mRNA Bivalent(MOD)    11/15/2021  Imm Admin: MODERNA SARS-COV-2 VACCINE (12+)    03/12/2021  Imm Admin: MODERNA SARS-COV-2 VACCINE (12+)    02/12/2021  Imm Admin: MODERNA SARS-COV-2 VACCINE (12+)              Ordered - A1c Screening (Every 6 Months) Ordered on 4/24/2024       12/29/2023  HEMOGLOBIN A1C    11/11/2019  HEMOGLOBIN A1C              Lung Cancer Screening (Yearly) Tentatively due on 7/9/2024 07/09/2023  CT-LUNG CANCER-SCREENING    12/23/2020  CT-LUNG CANCER-SCREENING    03/13/2019  CT-CHEST (THORAX) W/O    09/10/2018  CT-LUNG CANCER-SCREENING    02/23/2015  CT-CHEST (THORAX) W/O    Only the first 5 history entries have been loaded, but more history exists.              Influenza Vaccine (Season Ended) Next due on 9/1/2024      10/13/2022  Imm Admin: Influenza Vaccine Adult HD    10/21/2021  Imm Admin: Influenza Vaccine Adult HD    10/26/2020  Imm Admin: Influenza Vaccine Adult HD    11/08/2019  Imm Admin: Influenza Vaccine Adult HD    09/12/2018  Imm Admin: Influenza Vaccine Adult HD    Only the first 5 history entries have been loaded, but more history exists.              Fasting Lipid Profile (Yearly) Tentatively due on 12/29/2024 12/29/2023  Outside Procedure: LIPID PROFILE    08/17/2023  Outside Procedure: LIPID PROFILE    07/27/2022  Outside Procedure: LIPID PROFILE    04/28/2022  LIPID PROFILE    01/11/2022  Lipid Profile    Only the first 5 history entries have been loaded, but more history exists.              SERUM CREATININE (Yearly) Next due on 3/18/2025      03/18/2024  BASIC METABOLIC PANEL    02/03/2024  COMP METABOLIC PANEL    12/29/2023  BASIC METABOLIC PANEL    08/17/2023  BASIC METABOLIC PANEL    07/27/2022  COMP METABOLIC PANEL    Only the first 5 history entries have been loaded, but more history exists.              Annual Wellness Visit (Yearly) Next due on 4/24/2025 04/24/2024  Level of Service: ANNUAL WELLNESS VISIT-INCLUDES PPPS SUBSEQUE*              IMM DTaP/Tdap/Td Vaccine (2 - Td or Tdap) Next due on 7/26/2028 07/26/2018  Imm Admin: Tdap Vaccine              Colorectal Cancer Screening (Colonoscopy - Every 10 Years) Tentatively due on 10/18/2028      10/18/2018  Surgical Procedure: COLONOSCOPY    12/23/2008  REFERRAL TO GI FOR  COLONOSCOPY              Zoster (Shingles) Vaccines (Series Information) Completed      01/09/2020  Imm Admin: Zoster Vaccine Recombinant (RZV) (SHINGRIX)    11/08/2019  Imm Admin: Zoster Vaccine Recombinant (RZV) (SHINGRIX)              Pneumococcal Vaccine: 65+ Years (Series Information) Completed      10/26/2020  Imm Admin: Pneumococcal polysaccharide vaccine (PPSV-23)    07/26/2018  Imm Admin: Pneumococcal Conjugate Vaccine (Prevnar/PCV-13)              Abdominal Aortic Aneurysm (AAA) Screening  Completed      01/03/2024  US-AORTA/ILIACS DUPLEX COMPLETE    09/15/2022  Prob Dx: Abdominal aortic aneurysm (AAA) without rupture (HCC)    12/09/2021  Visit Dx: Abdominal aortic aneurysm (AAA) without rupture (HCC)    12/03/2021  Visit Dx: Abdominal aortic aneurysm (AAA) without rupture (HCC)    12/03/2021  US-AORTA/ILIACS DUPLEX COMPLETE    Only the first 5 history entries have been loaded, but more history exists.              Hepatitis A Vaccine (Hep A) (Series Information) Aged Out      No completion history exists for this topic.              Hepatitis B Vaccine (Hep B) (Series Information) Aged Out      No completion history exists for this topic.              HPV Vaccines (Series Information) Aged Out      No completion history exists for this topic.              Polio Vaccine (Inactivated Polio) (Series Information) Aged Out      No completion history exists for this topic.              Meningococcal Immunization (Series Information) Aged Out      No completion history exists for this topic.              Discontinued - Lung Cancer Screening Shared Decision Making  Discontinued        Frequency changed to Never automatically (Topic No Longer Applies)    08/16/2018  Level of Service: [place] COUNSELING LUNG CA SCREEN LDCT                    Patient Care Team:  Hero Hernández D.O. as PCP - General (Family Medicine)  ROSALIA Milligan as PCP - [facility] Paneled  Shweta Cantor M.D.   Alfredo Read DO (Cardiovascular  Disease (Cardiology))  Alfrdeo Read D.O. (Cardiovascular Disease (Cardiology))  Ryley Gupta Ass't as [facility]         Social History     Tobacco Use    Smoking status: Every Day     Current packs/day: 1.00     Average packs/day: 1 pack/day for 45.0 years (45.0 ttl pk-yrs)     Types: Cigarettes    Smokeless tobacco: Never    Tobacco comments:     Working on quitting (7/26/23)   Vaping Use    Vaping Use: Never used   Substance Use Topics    Alcohol use: Not Currently     Alcohol/week: 0.6 oz     Types: 1 Cans of beer per week     Comment: monthly    Drug use: No     Family History   Problem Relation Age of Onset    Heart Attack Mother     Heart Disease Mother     Hypertension Mother     Cancer Father     Heart Attack Father         s/p CABG    Heart Disease Father         AAA    Hypertension Father     Heart Attack Paternal Grandmother     Heart Attack Paternal Grandfather      He  has a past medical history of Arthritis, Atrial fibrillation ([facility]ly [facility]), CHF (congestive heart failure) (Formerly [facility]), Chronic bronchitis (Formerly [facility]), Disorder of thyroid, Emphysema of lung (Formerly [facility]), GERD (gastroesophageal reflux disease), Hemorrhagic disorder ([facility]ly [facility]), Hyperlipidemia, Myocardial infarct ([facility]ly [facility]) (87, ?89, 2007), Other and unspecified angina pectoris (10/2016), Pacemaker (10/20/2016), and Type 2 diabetes mellitus without complication, without long-term current use of insulin ([facility]ly [facility]) (4/24/2024).    He has no past medical history of Acute nasopharyngitis, Blood clotting disorder (Formerly [facility]), Bowel habit changes, Breath shortness, Cancer ([facility]ly [facility]), Carcinoma in situ of respiratory system, Congestive heart failure (Formerly [facility]), Continuous ambulatory peritoneal dialysis status (Formerly [facility]), Coughing blood, Dialysis patient (Formerly [facility]), Glaucoma, Gynecological disorder, Heart burn, Heart murmur, Heart valve disease, Hepatitis A, Hepatitis B, Hepatitis C, Hiatus hernia syndrome, Indigestion, Jaundice, Pneumonia, Pregnant, Psychiatric problem, Renal  "disorder, Rheumatic fever, Seizure (HCC), Sleep apnea, Snoring, Stroke (HCC), Tuberculosis, Urinary bladder disorder, or Urinary incontinence.   Past Surgical History:   Procedure Laterality Date    VT NJX AA&/STRD TFRML EPI LUMBAR/SACRAL 1 LEVEL Bilateral 11/21/2023    Procedure: RIGHT and LEFT L5-S1 transforaminal epidural steroid injection with fluoroscopic guidance  NEED CLEARANCE TO HOLD ELIQUIS FROM DR. THOMAS OR LINDA CHAMORRO AT [facility];  Surgeon: Angelica Hong M.D.;  Location: SURGERY [facility] PAIN MANAGEMENT;  Service: Pain Management    COLONOSCOPY  10/18/2018    Procedure: COLONOSCOPY - W/POSS BX, DILATION, POLYPECTOMY, CONTROL OF HEMORRHAGE;  Surgeon: Pineda Horvath M.D.;  Location: SURGERY [facility];  Service: EUS    PACEMAKER INSERTION  10/20/2016    INGUINAL HERNIA REPAIR BILATERAL  03/02/2015    Performed by Jack Tobias M.D. at SURGERY SAME DAY [facility]    COLONOSCOPY  2008    MULTIPLE CORONARY ARTERY BYPASS  1991    stents (X 3 placement= stents total)    APPENDECTOMY      APPENDECTOMY CHILD      OTHER ORTHOPEDIC SURGERY      knee arthoscopy, bilaterally (\"early 80's\")    TONSILLECTOMY  as a child       Exam:   BP 96/58   Pulse 86   Temp (!) 35.8 °C (96.4 °F) (Temporal)   Resp 12   Ht 1.778 m (5' 10\")   Wt 87.9 kg (193 lb 12.6 oz)   SpO2 92%  Body mass index is 27.81 kg/m².    Hearing good.    Dentition upper and lower dentures  Alert, oriented in no acute distress.  Eye contact is good, speech goal directed, affect calm    Physical Exam  Constitutional:       General: He is not in acute distress.     Appearance: He is normal weight.   Cardiovascular:      Rate and Rhythm: Normal rate and regular rhythm.      Pulses: Normal pulses.      Heart sounds: Normal heart sounds.   Pulmonary:      Effort: Pulmonary effort is normal. No respiratory distress.      Breath sounds: Normal breath sounds. No stridor. No wheezing, rhonchi or rales.   Neurological:      Mental Status: " He is alert.           Assessment and Plan. The following treatment and monitoring plan is recommended:      Problem List Items Addressed This Visit       Chronic obstructive pulmonary disease (HCC)     Chronic uncontrolled condition  Having increasing dyspnea on exertion.  Start Stiolto inhaler, continue albuterol as needed.  Pulmonary function test ordered         Relevant Medications    Tiotropium Bromide-Olodaterol (STIOLTO RESPIMAT) 2.5-2.5 MCG/ACT Aero Soln    Other Relevant Orders    PULMONARY FUNCTION TESTS -Test requested: Complete Pulmonary Function Test    Lung nodules     Will be due for lung cancer screening July 2024         Hypothyroidism     Currently on levothyroxine 88 mcg daily.  Is currently put on amiodarone which may be interfering with his thyroid function testing.  His last TSH elevated at 6.14 but free T4 testing was not done.         Relevant Orders    TSH    FREE THYROXINE    Ventricular tachycardia (HCC)     S/p dual chamber ICD implantation in 2016.  Failed Amiodarone, s/p VT ablation (Dr. Campbell 11/22/21).     Followed by cardiology.  Currently on medications for rate control for A-fib amiodarone 200 mg daily, metoprolol 50 mg twice daily         Type 2 diabetes mellitus without complication, without long-term current use of insulin (HCC)     This is a new diagnosis found on A1c screening by his vascular medicine team  Last A1c 3 months ago 6.5  On Farxiga 10 mg daily  He does admit to eating quite a lot of sugar including many carbohydrates.  His A1c for diabetic is within goal.  He is to continue the Farxiga and work on his diet and exercise.          Relevant Orders    MICROALBUMIN CREAT RATIO URINE    HEMOGLOBIN A1C        Services suggested: No services needed at this time  Health Care Screening: Age-appropriate preventive services recommended by USPTF and ACIP covered by Medicare were discussed today. Services ordered if indicated and agreed upon by the patient.  Referrals offered:  Community-based lifestyle interventions to reduce health risks and promote self-management and wellness, fall prevention, nutrition, physical activity, tobacco-use cessation, weight loss, and mental health services as per orders if indicated.    Discussion today about general wellness and lifestyle habits:    Prevent falls and reduce trip hazards; Cautioned about securing or removing rugs.  Have a working fire alarm and carbon monoxide detector;   Engage in regular physical activity and social activities     Follow-up: Return in about 2 months (around 6/24/2024), or if symptoms worsen or fail to improve.

## 2024-04-24 NOTE — ASSESSMENT & PLAN NOTE
Chronic uncontrolled condition  Having increasing dyspnea on exertion.  Start Stiolto inhaler, continue albuterol as needed.  Pulmonary function test ordered

## 2024-04-24 NOTE — ASSESSMENT & PLAN NOTE
Currently on levothyroxine 88 mcg daily.  Is currently put on amiodarone which may be interfering with his thyroid function testing.  His last TSH elevated at 6.14 but free T4 testing was not done.

## 2024-04-25 LAB
CREAT UR-MCNC: 71.41 MG/DL
MICROALBUMIN UR-MCNC: <1.2 MG/DL
MICROALBUMIN/CREAT UR: NORMAL MG/G (ref 0–30)

## 2024-05-03 ENCOUNTER — OFFICE VISIT (OUTPATIENT)
Dept: MEDICAL GROUP | Facility: PHYSICIAN GROUP | Age: 74
End: 2024-05-03
Payer: MEDICARE

## 2024-05-03 VITALS
BODY MASS INDEX: 27.14 KG/M2 | TEMPERATURE: 97 F | HEART RATE: 76 BPM | WEIGHT: 189.6 LBS | DIASTOLIC BLOOD PRESSURE: 56 MMHG | HEIGHT: 70 IN | SYSTOLIC BLOOD PRESSURE: 100 MMHG | OXYGEN SATURATION: 97 % | RESPIRATION RATE: 12 BRPM

## 2024-05-03 DIAGNOSIS — R20.9 COLD EXTREMITIES: ICD-10-CM

## 2024-05-03 DIAGNOSIS — R09.89 DECREASED PULSES IN FEET: ICD-10-CM

## 2024-05-03 DIAGNOSIS — E11.9 TYPE 2 DIABETES MELLITUS WITHOUT COMPLICATION, WITHOUT LONG-TERM CURRENT USE OF INSULIN (HCC): ICD-10-CM

## 2024-05-03 DIAGNOSIS — E03.9 HYPOTHYROIDISM, UNSPECIFIED TYPE: ICD-10-CM

## 2024-05-03 PROCEDURE — 3074F SYST BP LT 130 MM HG: CPT | Performed by: STUDENT IN AN ORGANIZED HEALTH CARE EDUCATION/TRAINING PROGRAM

## 2024-05-03 PROCEDURE — 3078F DIAST BP <80 MM HG: CPT | Performed by: STUDENT IN AN ORGANIZED HEALTH CARE EDUCATION/TRAINING PROGRAM

## 2024-05-03 PROCEDURE — 99214 OFFICE O/P EST MOD 30 MIN: CPT | Performed by: STUDENT IN AN ORGANIZED HEALTH CARE EDUCATION/TRAINING PROGRAM

## 2024-05-03 RX ORDER — LEVOTHYROXINE SODIUM 0.1 MG/1
100 TABLET ORAL
Qty: 90 TABLET | Refills: 0 | Status: SHIPPED | OUTPATIENT
Start: 2024-05-03

## 2024-05-03 ASSESSMENT — FIBROSIS 4 INDEX: FIB4 SCORE: 2.82

## 2024-05-03 NOTE — PROGRESS NOTES
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 male, established patient who presents today to discuss medical problems as listed below:    History of Present Illness  The patient is a 74-year-old male here for a follow-up. I last saw him earlier last month. He has type 2 diabetes, which is a new diagnosis.    The patient was recently diagnosed with type 2 diabetes and is currently on a regimen of Farxiga 10 mg.     His dietary habits include a high consumption of Coke, a habit he previously consumed 32 ounces of Diet Coke daily. Farxiga was initiated for heart failure with reduced ejection fraction and ischemic cardiomyopathy. He underwent an ablation procedure for atrial fibrillation.     He experiences cold extremities and aching in his hips and upper legs after walking, which subsides upon rest. He has been applying lotion to his feet, which has improved his skin dryness. His most recent ophthalmology visit occurred on Monday or Tuesday at [facility] in [place], yielded normal results.    The patient has reduced his smoking habit from half a pack to 6 cigarettes daily. He has been smoking for approximately 60 years. He successfully quit smoking for 2 years. Previous attempts to quit smoking with nicotine gum, patches, and pills resulted in headaches. He is currently attempting to reduce his smoking habit by 1 cigarette every few weeks.    Supplemental Information  He has high blood pressure and takes medication for that.       Current Outpatient Medications Ordered in Epic   Medication Sig Dispense Refill    levothyroxine (SYNTHROID) 100 MCG Tab Take 1 Tablet by mouth every morning on an empty stomach. 90 Tablet 0    Tiotropium Bromide-Olodaterol (STIOLTO RESPIMAT) 2.5-2.5 MCG/ACT Aero Soln Inhale 2 Puffs every day. 4 g 1    amiodarone (CORDARONE) 200 MG Tab Take 200 mg by mouth every day.      sacubitril-valsartan (ENTRESTO) 24-26 MG Tab Take 1  Tablet by mouth 2 times a day.      dapagliflozin propanediol (FARXIGA) 10 MG Tab Take 10 mg by mouth every day.      spironolactone (ALDACTONE) 25 MG Tab       magnesium oxide (MAG-OX) 400 MG Tab tablet Take 1 Tablet by mouth every day.      metoprolol SR (TOPROL XL) 50 MG TABLET SR 24 HR Take 1 Tablet by mouth 2 times a day.      albuterol 108 (90 Base) MCG/ACT Aero Soln inhalation aerosol Inhale 2 Puffs every 6 hours as needed for Shortness of Breath. 1 Each 1    rosuvastatin (CRESTOR) 40 MG tablet Take 40 mg by mouth every day.      ammonium lactate (LAC-HYDRIN) 12 % Lotion Apply 1 Application topically 3 times a day as needed (dry skin). 225 g 1    nitroglycerin (NITROSTAT) 0.4 MG SL Tab Place 0.4 mg under the tongue as needed.      apixaban (ELIQUIS) 5mg Tab Take 5 mg by mouth 2 Times a Day.       No current [facility]-ordered facility-administered medications on file.       Review of systems:  Per HPI    Patient Active Problem List    Diagnosis Date Noted    Type 2 diabetes mellitus without complication, without long-term current use of insulin ([facility]) 04/24/2024    Chronic bilateral low back pain with bilateral sciatica 05/24/2023    Hip pain, bilateral 07/29/2022    Xerosis of skin 04/29/2022    H/O cardiac radiofrequency ablation: 11/22/21.  RFA of 3 different VT morphologies from lateral wall scar.  Dr Campbell  11/23/2021    Current every day smoker 10/21/2021    Peripheral cyanosis 06/04/2021    Chronic anticoagulation 04/02/2021    Hx of CABG 04/02/2021    Coronary artery disease involving native coronary artery of native heart without angina pectoris 04/02/2021    Decreased hearing of both ears 03/10/2021    AICD (automatic cardioverter/defibrillator) present 12/09/2020    Ventricular tachycardia (HCC) 12/09/2020    Hypothyroidism 11/27/2019    Vitamin D deficiency 11/27/2019    Chronic obstructive pulmonary disease (HCC) 09/12/2018    Lung nodules 09/12/2018    Aortic aneurysm ([facility]) 09/12/2018    Cardiomyopathy  "(HCC) 08/08/2018    Essential hypertension 07/26/2018    History of MI (myocardial infarction) 07/26/2018    Cigarette nicotine dependence without complication 07/26/2018    Pacemaker 07/26/2018    Chronic atrial fibrillation (HCC) 07/26/2018    Mixed hyperlipidemia 07/26/2018     Past Surgical History:   Procedure Laterality Date    MN NJX AA&/STRD TFRML EPI LUMBAR/SACRAL 1 LEVEL Bilateral 11/21/2023    Procedure: RIGHT and LEFT L5-S1 transforaminal epidural steroid injection with fluoroscopic guidance  NEED CLEARANCE TO HOLD ELIQUIS FROM DR. THOMAS OR LINDA CHAMORRO AT [facility];  Surgeon: Angelica Hong M.D.;  Location: [facility];  Service: Pain Management    COLONOSCOPY  10/18/2018    Procedure: COLONOSCOPY - W/POSS BX, DILATION, POLYPECTOMY, CONTROL OF HEMORRHAGE;  Surgeon: Pineda Horvath M.D.;  Location: SURGERY [facility];  Service: EUS    PACEMAKER INSERTION  10/20/2016    INGUINAL HERNIA REPAIR BILATERAL  03/02/2015    Performed by Jack Tobias M.D. at SURGERY SAME DAY [facility]    COLONOSCOPY  2008    MULTIPLE CORONARY ARTERY BYPASS  1991    stents (X 3 placement= stents total)    APPENDECTOMY      APPENDECTOMY CHILD      OTHER ORTHOPEDIC SURGERY      knee arthoscopy, bilaterally (\"early 80's\")    TONSILLECTOMY  as a child     Social History     Tobacco Use    Smoking status: Every Day     Current packs/day: 1.00     Average packs/day: 1 pack/day for 45.0 years (45.0 ttl pk-yrs)     Types: Cigarettes    Smokeless tobacco: Never    Tobacco comments:     Working on quitting (7/26/23)   Vaping Use    Vaping Use: Never used   Substance Use Topics    Alcohol use: Not Currently     Alcohol/week: 0.6 oz     Types: 1 Cans of beer per week     Comment: monthly    Drug use: No      Family History   Problem Relation Age of Onset    Heart Attack Mother     Heart Disease Mother     Hypertension Mother     Cancer Father     Heart Attack Father         s/p CABG    Heart Disease " "Father         AAA    Hypertension Father     Heart Attack Paternal Grandmother     Heart Attack Paternal Grandfather      Current Outpatient Medications   Medication Sig Dispense Refill    levothyroxine (SYNTHROID) 100 MCG Tab Take 1 Tablet by mouth every morning on an empty stomach. 90 Tablet 0    Tiotropium Bromide-Olodaterol (STIOLTO RESPIMAT) 2.5-2.5 MCG/ACT Aero Soln Inhale 2 Puffs every day. 4 g 1    amiodarone (CORDARONE) 200 MG Tab Take 200 mg by mouth every day.      sacubitril-valsartan (ENTRESTO) 24-26 MG Tab Take 1 Tablet by mouth 2 times a day.      dapagliflozin propanediol (FARXIGA) 10 MG Tab Take 10 mg by mouth every day.      spironolactone (ALDACTONE) 25 MG Tab       magnesium oxide (MAG-OX) 400 MG Tab tablet Take 1 Tablet by mouth every day.      metoprolol SR (TOPROL XL) 50 MG TABLET SR 24 HR Take 1 Tablet by mouth 2 times a day.      albuterol 108 (90 Base) MCG/ACT Aero Soln inhalation aerosol Inhale 2 Puffs every 6 hours as needed for Shortness of Breath. 1 Each 1    rosuvastatin (CRESTOR) 40 MG tablet Take 40 mg by mouth every day.      ammonium lactate (LAC-HYDRIN) 12 % Lotion Apply 1 Application topically 3 times a day as needed (dry skin). 225 g 1    nitroglycerin (NITROSTAT) 0.4 MG SL Tab Place 0.4 mg under the tongue as needed.      apixaban (ELIQUIS) 5mg Tab Take 5 mg by mouth 2 Times a Day.       No current facility-administered medications for this visit.       Allergies:  No Known Allergies    Allergies, past medical history, past surgical history, family history, social history reviewed and updated.    Objective:    /56   Pulse 76   Temp 36.1 °C (97 °F) (Temporal)   Resp 12   Ht 1.778 m (5' 10\")   Wt 86 kg (189 lb 9.5 oz)   SpO2 97%   BMI 27.20 kg/m²    Body mass index is 27.2 kg/m².    Physical exam:  General: Normal appearance, no acute distress, not ill-appearing  HEENT: EOM intact, conjunctiva normal limits, negative right/left eye discharge.  Sclera " anicteric  Cardiovascular: Normal rate and rhythm, no murmurs  Pulmonary: No respiratory distress, no wheezing, no rales, breath sounds normal.  Musculoskeletal: No edema bilaterally  Skin: Warm, dry, no lesions  Neurological: No focal deficits, normal gait  Psychiatric: Mood within normal limits    Monofilament testing with a 10 gram force: sensation intact: intact bilaterally  Visual Inspection: Feet without maceration, ulcers, fissures.  Pedal pulses: decreased bilaterally      Assessment/Plan:    Assessment & Plan  1. Type 2 diabetes.  The current treatment plan involves the continuation of Farxiga 10 mg daily, and the patient is advised to focus on dietary and exercise modifications.    Retinal scan records requested  Monofilament today normal sensation though decreased pulses bilaterally    A1c 6.6 within goal    2. Hypothyroidism.  On amiodarone  TSH levels elevated free T4 within normal limits.  Likely secondary to underdosing of medication.  The current dosage of thyroid medication will be increased to 100 mcg. Laboratory tests will be ordered to be conducted in 3 months.    3. Ischemic cardiomyopathy.  Continue follow-up cardiology    4.  Faint pedal pulses  # Cold extremities  # Cyanosis  ALLY ordered    5. Smoking cessation.  The patient is encouraged to reduce his smoking habit by 1 cigarette every few weeks. Should there be no improvement in his smoking habit in 3 months, the initiation of Wellbutrin will be considered.       Problem List Items Addressed This Visit       Hypothyroidism    Relevant Medications    levothyroxine (SYNTHROID) 100 MCG Tab    Other Relevant Orders    TSH    FREE THYROXINE    Type 2 diabetes mellitus without complication, without long-term current use of insulin (HCC)    Relevant Orders    Diabetic Monofilament LE Exam (Completed)     Other Visit Diagnoses       Decreased pulses in feet        Relevant Orders    US-EXTREMITY ARTERY LOWER BILAT W/ALLY (COMBO)    Cold extremities         Relevant Orders    US-EXTREMITY ARTERY LOWER BILAT W/ALLY (COMBO)            Return in about 3 months (around 8/3/2024).

## 2024-05-08 ENCOUNTER — PATIENT OUTREACH (OUTPATIENT)
Dept: HEALTH INFORMATION MANAGEMENT | Facility: OTHER | Age: 74
End: 2024-05-08
Payer: MEDICARE

## 2024-05-08 DIAGNOSIS — I10 ESSENTIAL HYPERTENSION: ICD-10-CM

## 2024-05-08 NOTE — PROGRESS NOTES
CHW called pt to introduce CCM services. Pt states that he is busy at the moment and would like a call back in the afternoon.

## 2024-06-07 ENCOUNTER — HOSPITAL ENCOUNTER (OUTPATIENT)
Dept: RADIOLOGY | Facility: MEDICAL CENTER | Age: 74
End: 2024-06-07
Attending: STUDENT IN AN ORGANIZED HEALTH CARE EDUCATION/TRAINING PROGRAM
Payer: MEDICARE

## 2024-06-07 DIAGNOSIS — R20.9 COLD EXTREMITIES: ICD-10-CM

## 2024-06-07 DIAGNOSIS — R09.89 DECREASED PULSES IN FEET: ICD-10-CM

## 2024-06-07 PROCEDURE — 93922 UPR/L XTREMITY ART 2 LEVELS: CPT

## 2024-06-25 ENCOUNTER — PATIENT OUTREACH (OUTPATIENT)
Dept: HEALTH INFORMATION MANAGEMENT | Facility: OTHER | Age: 74
End: 2024-06-25
Payer: MEDICARE

## 2024-06-25 DIAGNOSIS — E11.9 TYPE 2 DIABETES MELLITUS WITHOUT COMPLICATION, WITHOUT LONG-TERM CURRENT USE OF INSULIN (HCC): ICD-10-CM

## 2024-06-25 DIAGNOSIS — I10 ESSENTIAL HYPERTENSION: ICD-10-CM

## 2024-07-02 ENCOUNTER — APPOINTMENT (OUTPATIENT)
Dept: ADMISSIONS | Facility: MEDICAL CENTER | Age: 74
End: 2024-07-02
Payer: MEDICARE

## 2024-07-03 ENCOUNTER — APPOINTMENT (OUTPATIENT)
Dept: ADMISSIONS | Facility: MEDICAL CENTER | Age: 74
End: 2024-07-03
Attending: INTERNAL MEDICINE
Payer: MEDICARE

## 2024-07-08 ENCOUNTER — PRE-ADMISSION TESTING (OUTPATIENT)
Dept: ADMISSIONS | Facility: MEDICAL CENTER | Age: 74
End: 2024-07-08
Attending: INTERNAL MEDICINE
Payer: MEDICARE

## 2024-07-08 VITALS — HEIGHT: 70 IN | BODY MASS INDEX: 27.2 KG/M2

## 2024-07-08 DIAGNOSIS — Z01.810 PRE-OPERATIVE CARDIOVASCULAR EXAMINATION: ICD-10-CM

## 2024-07-08 DIAGNOSIS — Z01.812 PRE-OPERATIVE LABORATORY EXAMINATION: ICD-10-CM

## 2024-07-08 RX ORDER — ICOSAPENT ETHYL 1 G/1
2 CAPSULE ORAL 2 TIMES DAILY
COMMUNITY

## 2024-07-23 ENCOUNTER — ANESTHESIA EVENT (OUTPATIENT)
Dept: SURGERY | Facility: MEDICAL CENTER | Age: 74
End: 2024-07-23
Payer: MEDICARE

## 2024-07-23 ENCOUNTER — HOSPITAL ENCOUNTER (OUTPATIENT)
Facility: MEDICAL CENTER | Age: 74
End: 2024-07-23
Attending: INTERNAL MEDICINE | Admitting: INTERNAL MEDICINE
Payer: MEDICARE

## 2024-07-23 ENCOUNTER — ANESTHESIA (OUTPATIENT)
Dept: SURGERY | Facility: MEDICAL CENTER | Age: 74
End: 2024-07-23
Payer: MEDICARE

## 2024-07-23 VITALS
DIASTOLIC BLOOD PRESSURE: 55 MMHG | RESPIRATION RATE: 17 BRPM | TEMPERATURE: 96.8 F | SYSTOLIC BLOOD PRESSURE: 120 MMHG | OXYGEN SATURATION: 92 % | WEIGHT: 186.18 LBS | HEART RATE: 58 BPM | HEIGHT: 70 IN | BODY MASS INDEX: 26.65 KG/M2

## 2024-07-23 LAB — PATHOLOGY CONSULT NOTE: NORMAL

## 2024-07-23 PROCEDURE — 700105 HCHG RX REV CODE 258: Performed by: STUDENT IN AN ORGANIZED HEALTH CARE EDUCATION/TRAINING PROGRAM

## 2024-07-23 PROCEDURE — 88305 TISSUE EXAM BY PATHOLOGIST: CPT

## 2024-07-23 PROCEDURE — 160035 HCHG PACU - 1ST 60 MINS PHASE I: Performed by: INTERNAL MEDICINE

## 2024-07-23 PROCEDURE — 700101 HCHG RX REV CODE 250: Performed by: STUDENT IN AN ORGANIZED HEALTH CARE EDUCATION/TRAINING PROGRAM

## 2024-07-23 PROCEDURE — 160009 HCHG ANES TIME/MIN: Performed by: INTERNAL MEDICINE

## 2024-07-23 PROCEDURE — 160002 HCHG RECOVERY MINUTES (STAT): Performed by: INTERNAL MEDICINE

## 2024-07-23 PROCEDURE — 160025 RECOVERY II MINUTES (STATS): Performed by: INTERNAL MEDICINE

## 2024-07-23 PROCEDURE — 160048 HCHG OR STATISTICAL LEVEL 1-5: Performed by: INTERNAL MEDICINE

## 2024-07-23 PROCEDURE — 700111 HCHG RX REV CODE 636 W/ 250 OVERRIDE (IP): Performed by: STUDENT IN AN ORGANIZED HEALTH CARE EDUCATION/TRAINING PROGRAM

## 2024-07-23 PROCEDURE — 160203 HCHG ENDO MINUTES - 1ST 30 MINS LEVEL 4: Performed by: INTERNAL MEDICINE

## 2024-07-23 PROCEDURE — 110371 HCHG SHELL REV 272: Performed by: INTERNAL MEDICINE

## 2024-07-23 PROCEDURE — 160046 HCHG PACU - 1ST 60 MINS PHASE II: Performed by: INTERNAL MEDICINE

## 2024-07-23 PROCEDURE — 160208 HCHG ENDO MINUTES - EA ADDL 1 MIN LEVEL 4: Performed by: INTERNAL MEDICINE

## 2024-07-23 RX ORDER — OXYCODONE HCL 5 MG/5 ML
5 SOLUTION, ORAL ORAL
Status: DISCONTINUED | OUTPATIENT
Start: 2024-07-23 | End: 2024-07-23 | Stop reason: HOSPADM

## 2024-07-23 RX ORDER — HYDRALAZINE HYDROCHLORIDE 20 MG/ML
5 INJECTION INTRAMUSCULAR; INTRAVENOUS
Status: DISCONTINUED | OUTPATIENT
Start: 2024-07-23 | End: 2024-07-23 | Stop reason: HOSPADM

## 2024-07-23 RX ORDER — SODIUM CHLORIDE, SODIUM LACTATE, POTASSIUM CHLORIDE, CALCIUM CHLORIDE 600; 310; 30; 20 MG/100ML; MG/100ML; MG/100ML; MG/100ML
INJECTION, SOLUTION INTRAVENOUS
Status: DISCONTINUED | OUTPATIENT
Start: 2024-07-23 | End: 2024-07-23 | Stop reason: SURG

## 2024-07-23 RX ORDER — ONDANSETRON 2 MG/ML
4 INJECTION INTRAMUSCULAR; INTRAVENOUS
Status: DISCONTINUED | OUTPATIENT
Start: 2024-07-23 | End: 2024-07-23 | Stop reason: HOSPADM

## 2024-07-23 RX ORDER — IPRATROPIUM BROMIDE AND ALBUTEROL SULFATE 2.5; .5 MG/3ML; MG/3ML
3 SOLUTION RESPIRATORY (INHALATION)
Status: DISCONTINUED | OUTPATIENT
Start: 2024-07-23 | End: 2024-07-23 | Stop reason: HOSPADM

## 2024-07-23 RX ORDER — DIPHENHYDRAMINE HYDROCHLORIDE 50 MG/ML
12.5 INJECTION INTRAMUSCULAR; INTRAVENOUS
Status: DISCONTINUED | OUTPATIENT
Start: 2024-07-23 | End: 2024-07-23 | Stop reason: HOSPADM

## 2024-07-23 RX ORDER — HYDROMORPHONE HYDROCHLORIDE 1 MG/ML
0.4 INJECTION, SOLUTION INTRAMUSCULAR; INTRAVENOUS; SUBCUTANEOUS
Status: DISCONTINUED | OUTPATIENT
Start: 2024-07-23 | End: 2024-07-23 | Stop reason: HOSPADM

## 2024-07-23 RX ORDER — EPHEDRINE SULFATE 50 MG/ML
INJECTION, SOLUTION INTRAVENOUS PRN
Status: DISCONTINUED | OUTPATIENT
Start: 2024-07-23 | End: 2024-07-23 | Stop reason: SURG

## 2024-07-23 RX ORDER — HYDROMORPHONE HYDROCHLORIDE 1 MG/ML
0.1 INJECTION, SOLUTION INTRAMUSCULAR; INTRAVENOUS; SUBCUTANEOUS
Status: DISCONTINUED | OUTPATIENT
Start: 2024-07-23 | End: 2024-07-23 | Stop reason: HOSPADM

## 2024-07-23 RX ORDER — SODIUM CHLORIDE, SODIUM LACTATE, POTASSIUM CHLORIDE, CALCIUM CHLORIDE 600; 310; 30; 20 MG/100ML; MG/100ML; MG/100ML; MG/100ML
INJECTION, SOLUTION INTRAVENOUS CONTINUOUS
Status: DISCONTINUED | OUTPATIENT
Start: 2024-07-23 | End: 2024-07-23

## 2024-07-23 RX ORDER — SODIUM CHLORIDE, SODIUM LACTATE, POTASSIUM CHLORIDE, CALCIUM CHLORIDE 600; 310; 30; 20 MG/100ML; MG/100ML; MG/100ML; MG/100ML
INJECTION, SOLUTION INTRAVENOUS CONTINUOUS
Status: DISCONTINUED | OUTPATIENT
Start: 2024-07-23 | End: 2024-07-23 | Stop reason: HOSPADM

## 2024-07-23 RX ORDER — LABETALOL HYDROCHLORIDE 5 MG/ML
5 INJECTION, SOLUTION INTRAVENOUS
Status: DISCONTINUED | OUTPATIENT
Start: 2024-07-23 | End: 2024-07-23 | Stop reason: HOSPADM

## 2024-07-23 RX ORDER — EPHEDRINE SULFATE 50 MG/ML
5 INJECTION, SOLUTION INTRAVENOUS
Status: DISCONTINUED | OUTPATIENT
Start: 2024-07-23 | End: 2024-07-23 | Stop reason: HOSPADM

## 2024-07-23 RX ORDER — OXYCODONE HCL 5 MG/5 ML
10 SOLUTION, ORAL ORAL
Status: DISCONTINUED | OUTPATIENT
Start: 2024-07-23 | End: 2024-07-23 | Stop reason: HOSPADM

## 2024-07-23 RX ORDER — HYDROMORPHONE HYDROCHLORIDE 1 MG/ML
0.2 INJECTION, SOLUTION INTRAMUSCULAR; INTRAVENOUS; SUBCUTANEOUS
Status: DISCONTINUED | OUTPATIENT
Start: 2024-07-23 | End: 2024-07-23 | Stop reason: HOSPADM

## 2024-07-23 RX ORDER — LIDOCAINE HYDROCHLORIDE 20 MG/ML
INJECTION, SOLUTION EPIDURAL; INFILTRATION; INTRACAUDAL; PERINEURAL PRN
Status: DISCONTINUED | OUTPATIENT
Start: 2024-07-23 | End: 2024-07-23 | Stop reason: SURG

## 2024-07-23 RX ADMIN — PROPOFOL 30 MG: 10 INJECTION, EMULSION INTRAVENOUS at 08:53

## 2024-07-23 RX ADMIN — LIDOCAINE HYDROCHLORIDE 80 MG: 20 INJECTION, SOLUTION EPIDURAL; INFILTRATION; INTRACAUDAL at 08:23

## 2024-07-23 RX ADMIN — PROPOFOL 20 MG: 10 INJECTION, EMULSION INTRAVENOUS at 08:26

## 2024-07-23 RX ADMIN — PROPOFOL 50 MG: 10 INJECTION, EMULSION INTRAVENOUS at 08:24

## 2024-07-23 RX ADMIN — SODIUM CHLORIDE, POTASSIUM CHLORIDE, SODIUM LACTATE AND CALCIUM CHLORIDE: 600; 310; 30; 20 INJECTION, SOLUTION INTRAVENOUS at 08:20

## 2024-07-23 RX ADMIN — PROPOFOL 30 MG: 10 INJECTION, EMULSION INTRAVENOUS at 08:49

## 2024-07-23 RX ADMIN — PROPOFOL 30 MG: 10 INJECTION, EMULSION INTRAVENOUS at 08:43

## 2024-07-23 RX ADMIN — PROPOFOL 30 MG: 10 INJECTION, EMULSION INTRAVENOUS at 08:29

## 2024-07-23 RX ADMIN — PROPOFOL 50 MG: 10 INJECTION, EMULSION INTRAVENOUS at 08:23

## 2024-07-23 RX ADMIN — PROPOFOL 30 MG: 10 INJECTION, EMULSION INTRAVENOUS at 08:38

## 2024-07-23 RX ADMIN — PROPOFOL 30 MG: 10 INJECTION, EMULSION INTRAVENOUS at 08:47

## 2024-07-23 RX ADMIN — PROPOFOL 30 MG: 10 INJECTION, EMULSION INTRAVENOUS at 08:57

## 2024-07-23 RX ADMIN — PROPOFOL 30 MG: 10 INJECTION, EMULSION INTRAVENOUS at 08:45

## 2024-07-23 RX ADMIN — PROPOFOL 20 MG: 10 INJECTION, EMULSION INTRAVENOUS at 08:27

## 2024-07-23 RX ADMIN — PROPOFOL 30 MG: 10 INJECTION, EMULSION INTRAVENOUS at 08:55

## 2024-07-23 RX ADMIN — PROPOFOL 30 MG: 10 INJECTION, EMULSION INTRAVENOUS at 08:41

## 2024-07-23 RX ADMIN — PROPOFOL 30 MG: 10 INJECTION, EMULSION INTRAVENOUS at 08:35

## 2024-07-23 RX ADMIN — PROPOFOL 30 MG: 10 INJECTION, EMULSION INTRAVENOUS at 08:51

## 2024-07-23 RX ADMIN — PROPOFOL 30 MG: 10 INJECTION, EMULSION INTRAVENOUS at 08:32

## 2024-07-23 RX ADMIN — PROPOFOL 20 MG: 10 INJECTION, EMULSION INTRAVENOUS at 08:25

## 2024-07-23 RX ADMIN — EPHEDRINE SULFATE 5 MG: 50 INJECTION, SOLUTION INTRAVENOUS at 08:34

## 2024-07-23 ASSESSMENT — PAIN DESCRIPTION - PAIN TYPE: TYPE: ACUTE PAIN

## 2024-07-23 ASSESSMENT — FIBROSIS 4 INDEX: FIB4 SCORE: 2.52

## 2024-07-23 ASSESSMENT — PAIN SCALES - GENERAL: PAIN_LEVEL: 2

## 2024-08-05 DIAGNOSIS — E03.9 HYPOTHYROIDISM, UNSPECIFIED TYPE: ICD-10-CM

## 2024-08-06 RX ORDER — LEVOTHYROXINE SODIUM 100 UG/1
100 TABLET ORAL
Qty: 100 TABLET | Refills: 2 | Status: SHIPPED | OUTPATIENT
Start: 2024-08-06

## 2024-08-07 ENCOUNTER — APPOINTMENT (OUTPATIENT)
Dept: MEDICAL GROUP | Facility: PHYSICIAN GROUP | Age: 74
End: 2024-08-07
Payer: MEDICARE

## 2025-04-11 DIAGNOSIS — E03.9 HYPOTHYROIDISM, UNSPECIFIED TYPE: ICD-10-CM

## 2025-04-11 RX ORDER — LEVOTHYROXINE SODIUM 100 UG/1
100 TABLET ORAL
Qty: 100 TABLET | Refills: 2 | Status: CANCELLED | OUTPATIENT
Start: 2025-04-11

## 2025-04-11 RX ORDER — LEVOTHYROXINE SODIUM 100 UG/1
100 TABLET ORAL
Qty: 100 TABLET | Refills: 2 | Status: SHIPPED | OUTPATIENT
Start: 2025-04-11

## 2025-04-12 NOTE — TELEPHONE ENCOUNTER
Received request via: Patient    Was the patient seen in the last year in this department? Yes    Does the patient have an active prescription (recently filled or refills available) for medication(s) requested? No    Pharmacy Name:  [facility] in [place]    Does the patient have senior living Plus and need 100-day supply? (This applies to ALL medications) Yes, quantity updated to 100 days

## (undated) DEVICE — CANISTER SUCTION RIGID RED 1500CC (40EA/CA)

## (undated) DEVICE — CANNULA W/ SUPPLY TUBING O2 - (50/CA)

## (undated) DEVICE — FORCEP RADIAL JAW 4 STANDARD CAPACITY W/NEEDLE 240CM (40EA/BX)

## (undated) DEVICE — SYRINGE SAFETY 3 ML 18 GA X 1 1/2 BLUNT LL (100/BX 8BX/CA)

## (undated) DEVICE — CATHETER IV SAFETY 20 GA X 1-1/4 (50/BX)

## (undated) DEVICE — ELECTRODE 850 FOAM ADHESIVE - HYDROGEL RADIOTRNSPRNT (50/PK)

## (undated) DEVICE — NEPTUNE 4 PORT MANIFOLD - (20/PK)

## (undated) DEVICE — SENSOR OXIMETER ADULT SPO2 RD SET (20EA/BX)

## (undated) DEVICE — SPONGE GAUZE NON-STERILE 4X4 - (2000/CA 10PK/CA)

## (undated) DEVICE — GOWN WARMING STANDARD FLEX - (30/CA)

## (undated) DEVICE — ELECTRODE DUAL RETURN W/ CORD - (50/PK)

## (undated) DEVICE — CAPTIVATOR II-25MM ROUND STIFF  (40/BX)

## (undated) DEVICE — SET EXTENSION WITH 2 PORTS (48EA/CA) ***PART #2C8610 IS A SUBSTITUTE*****

## (undated) DEVICE — SYRINGE DISP. 50CC LS - (40/BX)

## (undated) DEVICE — DEVICE HEMOSTATIC CLIPPING RESOLUTION 360 DEGREES (20EA/BX)

## (undated) DEVICE — LACTATED RINGERS INJ 1000 ML - (14EA/CA 60CA/PF)

## (undated) DEVICE — TUBING CLEARLINK DUO-VENT - C-FLO (48EA/CA)

## (undated) DEVICE — SYRINGE SAFETY 5 ML 18 GA X 1-1/2 BLUNT LL (100/BX 4BX/CA)

## (undated) DEVICE — TUBE CONNECTING SUCTION - CLEAR PLASTIC STERILE 72 IN (50EA/CA)

## (undated) DEVICE — KIT  I.V. START (100EA/CA)

## (undated) DEVICE — GOWN SURGEONS X-LARGE - DISP. (30/CA)

## (undated) DEVICE — PAD PREP 24 X 48 CUFFED - (100/CA)

## (undated) DEVICE — BASIN EMESIS DISP. - (250/CA)

## (undated) DEVICE — GOWN SURGEONS LARGE - (32/CA)

## (undated) DEVICE — TRAP POLYP E-TRAP (25EA/BX)

## (undated) DEVICE — CANNULA O2 COMFORT SOFT EAR ADULT 7 FT TUBING (50/CA)

## (undated) DEVICE — SYRINGE SAFETY 10 ML 18 GA X 1 1/2 BLUNT LL (100/BX 4BX/CA)

## (undated) DEVICE — KIT CUSTOM PROCEDURE SINGLE FOR ENDO  (15/CA)

## (undated) DEVICE — GLOVE, LITE (PAIR)

## (undated) DEVICE — SENSOR SPO2 ADULT LNCS ADTX (20/BX) ORDER ITEM #19593